# Patient Record
Sex: FEMALE | Race: WHITE | NOT HISPANIC OR LATINO | Employment: FULL TIME | URBAN - METROPOLITAN AREA
[De-identification: names, ages, dates, MRNs, and addresses within clinical notes are randomized per-mention and may not be internally consistent; named-entity substitution may affect disease eponyms.]

---

## 2017-12-20 ENCOUNTER — GENERIC CONVERSION - ENCOUNTER (OUTPATIENT)
Dept: OTHER | Facility: OTHER | Age: 52
End: 2017-12-20

## 2017-12-20 ENCOUNTER — ALLSCRIPTS OFFICE VISIT (OUTPATIENT)
Dept: OTHER | Facility: OTHER | Age: 52
End: 2017-12-20

## 2017-12-20 DIAGNOSIS — Z12.31 ENCOUNTER FOR SCREENING MAMMOGRAM FOR MALIGNANT NEOPLASM OF BREAST: ICD-10-CM

## 2017-12-20 DIAGNOSIS — H91.90 HEARING LOSS: ICD-10-CM

## 2017-12-20 DIAGNOSIS — R92.8 OTHER ABNORMAL AND INCONCLUSIVE FINDINGS ON DIAGNOSTIC IMAGING OF BREAST: ICD-10-CM

## 2017-12-20 DIAGNOSIS — Z23 ENCOUNTER FOR IMMUNIZATION: ICD-10-CM

## 2017-12-29 LAB
ADEQUACY: (HISTORICAL): ABNORMAL
CLINICIAN PROVIDIED ICD 9 OR 10 (HISTORICAL): ABNORMAL
COMMENT (HISTORICAL): ABNORMAL
DIAGNOSIS (HISTORICAL): ABNORMAL
ELECTRONICALLY SIGNED BY (HISTORICAL): ABNORMAL
HPV HIGH RISK (HISTORICAL): NEGATIVE
PATHOLOGIST PROVIDED ICD10 (HISTORICAL): ABNORMAL
PERFORMED BY (HISTORICAL): ABNORMAL
RECOMMENDATION (HISTORICAL): ABNORMAL
REFLEX (HISTORICAL): ABNORMAL

## 2018-01-05 ENCOUNTER — GENERIC CONVERSION - ENCOUNTER (OUTPATIENT)
Dept: OTHER | Facility: OTHER | Age: 53
End: 2018-01-05

## 2018-01-07 ENCOUNTER — GENERIC CONVERSION - ENCOUNTER (OUTPATIENT)
Dept: OTHER | Facility: OTHER | Age: 53
End: 2018-01-07

## 2018-01-12 ENCOUNTER — GENERIC CONVERSION - ENCOUNTER (OUTPATIENT)
Dept: OTHER | Facility: OTHER | Age: 53
End: 2018-01-12

## 2018-01-12 ENCOUNTER — HOSPITAL ENCOUNTER (OUTPATIENT)
Dept: RADIOLOGY | Facility: HOSPITAL | Age: 53
Discharge: HOME | End: 2018-01-12
Attending: INTERNAL MEDICINE
Payer: COMMERCIAL

## 2018-01-12 DIAGNOSIS — Z12.31 ENCOUNTER FOR SCREENING MAMMOGRAM FOR MALIGNANT NEOPLASM OF BREAST: ICD-10-CM

## 2018-01-12 LAB
A/G RATIO (HISTORICAL): 1.6 (ref 1.2–2.2)
ALBUMIN SERPL BCP-MCNC: 4.2 G/DL (ref 3.5–5.5)
ALP SERPL-CCNC: 61 IU/L (ref 39–117)
ALT SERPL W P-5'-P-CCNC: 37 IU/L (ref 0–32)
AST SERPL W P-5'-P-CCNC: 27 IU/L (ref 0–40)
BASOPHILS # BLD AUTO: 0 X10E3/UL (ref 0–0.2)
BASOPHILS # BLD AUTO: 1 %
BILIRUB SERPL-MCNC: 0.3 MG/DL (ref 0–1.2)
BUN SERPL-MCNC: 15 MG/DL (ref 6–24)
BUN/CREA RATIO (HISTORICAL): 21 (ref 9–23)
CALCIUM SERPL-MCNC: 9.7 MG/DL (ref 8.7–10.2)
CHLORIDE SERPL-SCNC: 103 MMOL/L (ref 96–106)
CHOLEST SERPL-MCNC: 254 MG/DL (ref 100–199)
CHOLEST/HDLC SERPL: 5.6 RATIO UNITS (ref 0–4.4)
CO2 SERPL-SCNC: 24 MMOL/L (ref 18–29)
CREAT SERPL-MCNC: 0.73 MG/DL (ref 0.57–1)
DEPRECATED RDW RBC AUTO: 13.9 % (ref 12.3–15.4)
EGFR AFRICAN AMERICAN (HISTORICAL): 111 ML/MIN/1.73
EGFR-AMERICAN CALC (HISTORICAL): 96 ML/MIN/1.73
EOSINOPHIL # BLD AUTO: 0.2 X10E3/UL (ref 0–0.4)
EOSINOPHIL # BLD AUTO: 3 %
GLUCOSE SERPL-MCNC: 93 MG/DL (ref 65–99)
HCT VFR BLD AUTO: 41.2 % (ref 34–46.6)
HDLC SERPL-MCNC: 45 MG/DL
HGB BLD-MCNC: 13.3 G/DL (ref 11.1–15.9)
IMM.GRANULOCYTES (CD4/8) (HISTORICAL): 0 %
IMM.GRANULOCYTES (CD4/8) (HISTORICAL): 0 X10E3/UL (ref 0–0.1)
INTERPRETATION (HISTORICAL): NORMAL
LDLC SERPL CALC-MCNC: 172 MG/DL (ref 0–99)
LYMPHOCYTES # BLD AUTO: 2.6 X10E3/UL (ref 0.7–3.1)
LYMPHOCYTES # BLD AUTO: 38 %
MCH RBC QN AUTO: 29.5 PG (ref 26.6–33)
MCHC RBC AUTO-ENTMCNC: 32.3 G/DL (ref 31.5–35.7)
MCV RBC AUTO: 91 FL (ref 79–97)
MONOCYTES # BLD AUTO: 0.5 X10E3/UL (ref 0.1–0.9)
MONOCYTES (HISTORICAL): 7 %
NEUTROPHILS # BLD AUTO: 3.4 X10E3/UL (ref 1.4–7)
NEUTROPHILS # BLD AUTO: 51 %
PLATELET # BLD AUTO: 453 X10E3/UL (ref 150–379)
POTASSIUM SERPL-SCNC: 4.8 MMOL/L (ref 3.5–5.2)
RBC (HISTORICAL): 4.51 X10E6/UL (ref 3.77–5.28)
SODIUM SERPL-SCNC: 143 MMOL/L (ref 134–144)
TOT. GLOBULIN, SERUM (HISTORICAL): 2.6 G/DL (ref 1.5–4.5)
TOTAL PROTEIN (HISTORICAL): 6.8 G/DL (ref 6–8.5)
TRIGL SERPL-MCNC: 186 MG/DL (ref 0–149)
VLDLC SERPL CALC-MCNC: 37 MG/DL (ref 5–40)
WBC # BLD AUTO: 6.7 X10E3/UL (ref 3.4–10.8)

## 2018-01-12 PROCEDURE — 77067 SCR MAMMO BI INCL CAD: CPT

## 2018-01-16 ENCOUNTER — GENERIC CONVERSION - ENCOUNTER (OUTPATIENT)
Dept: OTHER | Facility: OTHER | Age: 53
End: 2018-01-16

## 2018-01-17 ENCOUNTER — HOSPITAL ENCOUNTER (OUTPATIENT)
Dept: RADIOLOGY | Facility: HOSPITAL | Age: 53
Discharge: HOME | End: 2018-01-17
Attending: INTERNAL MEDICINE
Payer: COMMERCIAL

## 2018-01-17 ENCOUNTER — ALLSCRIPTS OFFICE VISIT (OUTPATIENT)
Dept: OTHER | Facility: OTHER | Age: 53
End: 2018-01-17

## 2018-01-17 DIAGNOSIS — R92.8 OTHER ABNORMAL AND INCONCLUSIVE FINDINGS ON DIAGNOSTIC IMAGING OF BREAST: ICD-10-CM

## 2018-01-17 PROCEDURE — 76642 ULTRASOUND BREAST LIMITED: CPT

## 2018-01-17 PROCEDURE — G0279 TOMOSYNTHESIS, MAMMO: HCPCS

## 2018-01-17 PROCEDURE — 77065 DX MAMMO INCL CAD UNI: CPT

## 2018-01-18 ENCOUNTER — APPOINTMENT (OUTPATIENT)
Dept: AUDIOLOGY | Facility: CLINIC | Age: 53
End: 2018-01-18
Payer: COMMERCIAL

## 2018-01-18 ENCOUNTER — GENERIC CONVERSION - ENCOUNTER (OUTPATIENT)
Dept: OTHER | Facility: OTHER | Age: 53
End: 2018-01-18

## 2018-01-18 PROCEDURE — 92567 TYMPANOMETRY: CPT | Performed by: AUDIOLOGIST

## 2018-01-18 PROCEDURE — 92557 COMPREHENSIVE HEARING TEST: CPT | Performed by: AUDIOLOGIST

## 2018-01-22 ENCOUNTER — ANESTHESIA EVENT (OUTPATIENT)
Dept: GASTROENTEROLOGY | Facility: HOSPITAL | Age: 53
End: 2018-01-22

## 2018-01-23 VITALS
WEIGHT: 239 LBS | HEIGHT: 64 IN | DIASTOLIC BLOOD PRESSURE: 78 MMHG | HEART RATE: 80 BPM | BODY MASS INDEX: 40.8 KG/M2 | RESPIRATION RATE: 16 BRPM | TEMPERATURE: 97.9 F | SYSTOLIC BLOOD PRESSURE: 124 MMHG

## 2018-01-23 NOTE — RESULT NOTES
Verified Results  ([address] [facility]) Pap IG, rfx HPV ASCU [id number] 12:00AM Lidia Lights     Test Name Result Flag Reference   DIAGNOSIS:  A    EPITHELIAL CELL ABNORMALITY  ATYPICAL SQUAMOUS CELLS OF UNDETERMINED SIGNIFICANCE  EPITHELIAL CELL ABNORMALITY  ATYPICAL SQUAMOUS CELLS OF UNDETERMINED SIGNIFICANCE  Recommendation:  A    Suggest follow up as clinically appropriate  Suggest follow up as clinically appropriate  Specimen adequacy:      Satisfactory for evaluation  Endocervical and/or squamous metaplastic  cells (endocervical component) are present  Satisfactory for evaluation  Endocervical and/or squamous metaplastic  cells (endocervical component) are present  Clinician provided ICD10: S92 134     Z01 419   Performed by:      Yasir Felton Cytotechnologist (ASCP)   Yasir Felton Cytotechnologist (ASCP)   Electronically signed by:      Alisha Rushing DO, FCAP, Pathologist   Alisha Rushing DO, FCAP, Pathologist   Michel   Pathologist provided ICD10: R87 610     R87 610   Note: (Report)     The Pap smear is a screening test designed to aid in the detection of  premalignant and malignant conditions of the uterine cervix  It is not a  diagnostic procedure and should not be used as the sole means of detecting  cervical cancer  Both false-positive and false-negative reports do occur  The Pap smear is a screening test designed to aid in the detection of  premalignant and malignant conditions of the uterine cervix  It is not a  diagnostic procedure and should not be used as the sole means of detecting  cervical cancer  Both false-positive and false-negative reports do occur  Reflex      See below for HPV testing results  See below for HPV testing results  HPV, high-risk Negative  Negative   This high-risk HPV test detects thirteen high-risk types  (16/18/31/33/35/39/45/51/52/56/58/59/68) without differentiation

## 2018-01-23 NOTE — PROGRESS NOTES
Assessment    1  Encounter for preventive health examination (V70 0) (Z00 00)   2  Adult BMI 40 0-44 9 kg/sq m (V85 41) (Z68 41)   3  Encounter for gynecological examination with Papanicolaou smear of cervix (V72 31)   (Z01 419)   4  Need for diphtheria-tetanus-pertussis (Tdap) vaccine (V06 1) (Z23)    Plan  Encounter for gynecological examination with Papanicolaou smear of cervix    · (1) THIN PREP PAP WITH IMAGING; Status:Active; Requested for:53Fny0212; Maturation index required? : No  HPV? : if ASCUS  Hard of hearing, Need for diphtheria-tetanus-pertussis (Tdap) vaccine    · *1 -  AUDIOLOGY [facility]  *  Status: Active  Requested for:  80Amk2405  Care Summary provided  : Yes  Hypercholesterolemia    · (1) CBC/PLT/DIFF; Status:Active; Requested for:71Ddg1528;    · (1) COMPREHENSIVE METABOLIC PANEL; Status:Active; Requested for:34Xyh6522;    · (1) LIPID PANEL, FASTING; Status:Active; Requested for:20Hfn1718;   Need for diphtheria-tetanus-pertussis (Tdap) vaccine    · Adacel 5-2-15 5 LF-MCG/0 5 Intramuscular Suspension  Screen for colon cancer    · COLONOSCOPY; Status:Active; Requested for:85Dmn3183;   Screening for malignant neoplasm of breast    · * MAMMO SCREENING BILATERAL W CAD; Status:Active; Requested for:91Jhp9852;     Discussion/Summary  health maintenance visit Currently, she eats a healthy diet and has an adequate exercise regimen  Pap test was done today cervical cancer screening is needed every three years Breast cancer screening: the risks and benefits of breast cancer screening were discussed, monthly self breast exam was advised and mammogram has been ordered  Colorectal cancer screening: colonoscopy has been ordered  Osteoporosis screening: bone mineral density testing is not indicated  Screening lab work includes hemoglobin, glucose and lipid profile  The immunizations will be given as outlined in the orders   Advice and education were given regarding nutrition, aerobic exercise, weight loss, calcium supplements, vitamin D supplements, alcohol use, sunscreen use and seat belt use  Chief Complaint  cpe      History of Present Illness  HM, Adult Female: The patient is being seen for a health maintenance evaluation  The last health maintenance visit was 2 year(s) ago  General Health: The patient's health since the last visit is described as good  She has regular dental visits  She denies vision problems  She denies hearing loss  Immunizations status: not up to date  Lifestyle:  She consumes a diverse and healthy diet  She has weight concerns  She exercises regularly  She exercises 5 times per week  Exercise includes walking  She does not use tobacco  She consumes alcohol  She reports frequent alcohol use and drinking 1 drinks per week  She denies drug use  Reproductive health: the patient is perimenopausal    Screening: cancer screening reviewed and updated  HPI: Here for CPE with pap  Feels well today   thinks she is hard of hearing and she does have some difficulty with background noise  Review of Systems    Constitutional: No fever, no chills, feels well, no tiredness, no recent weight gain or weight loss  Eyes: No complaints of eye pain, no red eyes, no eyesight problems, no discharge, no dry eyes, no itching of eyes  ENT: as noted in HPI  Cardiovascular: No complaints of slow heart rate, no fast heart rate, no chest pain, no palpitations, no leg claudication, no lower extremity edema  Respiratory: No complaints of shortness of breath, no wheezing, no cough, no SOB on exertion, no orthopnea, no PND  Gastrointestinal: No complaints of abdominal pain, no constipation, no nausea or vomiting, no diarrhea, no bloody stools  Genitourinary: No complaints of dysuria, no incontinence, no pelvic pain, no dysmenorrhea, no vaginal discharge or bleeding     Musculoskeletal: No complaints of arthralgias, no myalgias, no joint swelling or stiffness, no limb pain or swelling  Integumentary: No complaints of skin rash or lesions, no itching, no skin wounds, no breast pain or lump  Neurological: No complaints of headache, no confusion, no convulsions, no numbness, no dizziness or fainting, no tingling, no limb weakness, no difficulty walking  Psychiatric: Not suicidal, no sleep disturbance, no anxiety or depression, no change in personality, no emotional problems  Endocrine: No complaints of proptosis, no hot flashes, no muscle weakness, no deepening of the voice, no feelings of weakness  Hematologic/Lymphatic: No complaints of swollen glands, no swollen glands in the neck, does not bleed easily, does not bruise easily  Active Problems    1  Alcohol abuse (305 00) (F10 10)   2  Depression (311) (F32 9)   3  Hypercholesterolemia (272 0) (E78 00)   4  Needs flu shot (V04 81) (Z23)   5  Screening for malignant neoplasm of breast (V76 10) (Z12 31)    Surgical History    · History of Dilation And Curettage    Family History  Father    · Family history of Current Smoker    Social History    · Alcohol Use (History)   · History of Current Every Day Smoker (305 1)   · Daily Coffee Consumption (___ Cups/Day)   · Daily Cola Consumption (___ Cans/Day)   · Former smoker (F55 27) (Q73 531)    Current Meds   1  No Reported Medications Recorded    Allergies    1  No Known Drug Allergies    Vitals   Recorded: 17Vhe1009 11:34AM   Temperature 97 9 F   Heart Rate 80   Respiration 16   Systolic 945   Diastolic 78   Height 5 ft 4 in   Weight 239 lb    BMI Calculated 41 02   BSA Calculated 2 11     Physical Exam    Constitutional   General appearance: Abnormal   obese  Eyes   Conjunctiva and lids: No swelling, erythema or discharge  Pupils and irises: Equal, round, reactive to light  Ears, Nose, Mouth, and Throat   External inspection of ears and nose: Normal     Otoscopic examination: Tympanic membranes translucent with normal light reflex   Canals patent without erythema  Hearing: Normal     Nasal mucosa, septum, and turbinates: Normal without edema or erythema  Lips, teeth, and gums: Normal, good dentition  Oropharynx: Normal with no erythema, edema, exudate or lesions  Neck   Neck: Supple, symmetric, trachea midline, no masses  Thyroid: Normal, no thyromegaly  Pulmonary   Respiratory effort: No increased work of breathing or signs of respiratory distress  Percussion of chest: Normal     Palpation of chest: Normal     Auscultation of lungs: Clear to auscultation  Cardiovascular   Palpation of heart: Normal PMI, no thrills  Auscultation of heart: Normal rate and rhythm, normal S1 and S2, no murmurs  Carotid pulses: 2+ bilaterally  Abdominal aorta: Normal     Femoral pulses: 2+ bilaterally  Pedal pulses: 2+ bilaterally  Examination of extremities for edema and/or varicosities: Normal     Chest   Breasts: Normal, no dimpling or skin changes appreciated  Palpation of breasts and axillae: Normal, no masses palpated  Chest: Normal     Abdomen   Abdomen: Non-tender, no masses  Liver and spleen: No hepatomegaly or splenomegaly  Examination for hernias: No hernia appreciated  Genitourinary   External genitalia and vagina: Normal, no lesions appreciated  Urethra: Normal, no discharge  Bladder: Not distended, no tenderness  Cervix: Normal, no lesions  Uterus: Normal size, no tenderness, no masses  Adnexa/Parametria: Normal, no masses or tenderness  Lymphatic   Palpation of lymph nodes in neck: No lymphadenopathy  Palpation of lymph nodes in axillae: No lymphadenopathy  Palpation of lymph nodes in groin: No lymphadenopathy  Palpation of lymph nodes in other areas: No lymphadenopathy  Musculoskeletal   Gait and station: Normal     Digits and nails: Normal without clubbing or cyanosis      Joints, bones, and muscles: Normal     Range of motion: Normal     Stability: Normal     Muscle strength/tone: Normal  Skin   Skin and subcutaneous tissue: Normal without rashes or lesions  Palpation of skin and subcutaneous tissue: Normal turgor  Neurologic   Cranial nerves: Cranial nerves II-XII intact  Reflexes: 2+ and symmetric  Sensation: No sensory loss  Psychiatric   Judgment and insight: Normal     Orientation to person, place, and time: Normal     Recent and remote memory: Intact  Mood and affect: Normal        Procedure    Procedure: Indication: routine screening     Results: 20/20 in both eyes with corrective device, 20/20 in the right eye with corrective device, 20/20 in the left eye with corrective device   Color vision was and the results were normal       Signatures   Electronically signed by : EUGENE Alaniz ; Dec 20 2017 12:16PM EST                       (Author)

## 2018-01-23 NOTE — RESULT NOTES
Discussion/Summary   see task, patient aware and is getting additional studies  Christina Perrin MD     Verified Results  *US BREAST RIGHT LIMITED (DIAGNOSTIC) [id number] 01:59PM Sherry Paz Order Number: [id number]    - Patient Instructions: To schedule this appointment, please contact Central Scheduling at [phone]  Test Name Result Flag Reference   US BREAST RIGHT LIMITED (Report)     Patient History:   Patient's BMI is 41 2  Reason for exam: addl evaluation requested from abnormal    screening  Mammo Diagnostic Right W DBT and CAD: January 17, 2018 - Check In   #: [de-identified]   2D/3D Procedure   3D CC and MLO view(s) were taken of the right breast    2D CC and MLO view(s) were taken of the right breast        Technologist: BETHANY Garcias   Prior study comparison: January 12, 2018, mammo screening    bilateral W CAD performed at [place] [address]  August 31, 2015, bilateral screening mammogram performed at [place] [address]  US Breast Right Limited: January 17, 2018 - Check In #: [de-identified]   Standard views  Technologist: Romayne Stade, RDMS   Diagnostic images of RIGHT breast were obtained with 3-D    tomographic imaging in the CC and MLO plane  The questionable    area of distortion and asymmetry along the RIGHT cc 12:00 plain    appears to likely represent summation artifact  There is however   a small focal area of nodular asymmetry in the upper outer    breast posterior 3rd also in question on these tomographic    images  Targeted ultrasound of RIGHT upper outer quadrant was performed    with a high frequency linear transducer  There is no sonographic    evidence for cystic or solid mass lesion or suspicious foci of    acoustic attenuation  Single small intramammary lymph node    incidentally noted in the 10 o'clock position 12 cm from the    nipple  Possibly partially accounting for the above finding   No   other sonographic abnormality identified  CONCLUSION: No evidence of malignancy demonstrated  ACR BI-RADSï¾® Assessments: BiRad:2 - Benign (Overall)   Right breast Right Diag Mamm: BiRad:2 - benign finding in the    right breast    Right breast Right Brst US: BiRad:2 - benign finding in the right   breast      Recommendation:   Routine screening mammogram of both breasts  The patient is scheduled in a reminder system for screening    mammography  Transcription Location: [facility] 98: [id number]     Risk Value(s):   Tyrer-Cuzick 10 Year: 2 300%, Tyrer-Cuzick Lifetime: 10 100%,    Myriad Table: 1 5%, EDGAR 5 Year: 0 6%, NCI Lifetime: 6 0%     [facility] DIAGNOSTIC RIGHT W 3D & CAD 17Jan2018 01:58PM Sherryll Favre Order Number: [id number]    - Patient Instructions: To schedule this appointment, please contact Central Scheduling at [phone]  Do not wear any perfume, powder, lotion or deodorant on breast or underarm area  Please bring your doctors order, referral (if needed) and insurance information with you on the day of the test  Failure to bring this information may result in this test being rescheduled  Arrive 15 minutes prior to your appointment time to register  On the day of your test, please bring any prior mammogram or breast studies with you that were not performed at a [facility]  Failure to bring prior exams may result in your test needing to be rescheduled  Test Name Result Flag Reference   [facility] DIAGNOSTIC RIGHT W 3D & CAD (Report)     Patient History:   Patient's BMI is 41 2  Reason for exam: addl evaluation requested from abnormal    screening       Mammo Diagnostic Right W DBT and CAD: January 17, 2018 - Check In   #: [de-identified]   2D/3D Procedure   3D CC and MLO view(s) were taken of the right breast    2D CC and MLO view(s) were taken of the right breast        Technologist: BETHANY Snyder   Prior study comparison: January 12, 2018, mammo screening    bilateral W CAD performed at [place] [address]  August 31, 2015, bilateral screening mammogram performed at [facility]  US Breast Right Limited: January 17, 2018 - Check In #: [de-identified]   Standard views  Technologist: Josh Barrett RDMS   Diagnostic images of RIGHT breast were obtained with 3-D    tomographic imaging in the CC and MLO plane  The questionable    area of distortion and asymmetry along the RIGHT cc 12:00 plain    appears to likely represent summation artifact  There is however   a small focal area of nodular asymmetry in the upper outer    breast posterior 3rd also in question on these tomographic    images  Targeted ultrasound of RIGHT upper outer quadrant was performed    with a high frequency linear transducer  There is no sonographic    evidence for cystic or solid mass lesion or suspicious foci of    acoustic attenuation  Single small intramammary lymph node    incidentally noted in the 10 o'clock position 12 cm from the    nipple  Possibly partially accounting for the above finding  No   other sonographic abnormality identified  CONCLUSION: No evidence of malignancy demonstrated  ACR BI-RADSï¾® Assessments: BiRad:2 - Benign (Overall)   Right breast Right Diag Mamm: BiRad:2 - benign finding in the    right breast    Right breast Right Brst US: BiRad:2 - benign finding in the right   breast      Recommendation:   Routine screening mammogram of both breasts  The patient is scheduled in a reminder system for screening    mammography  Transcription Location: [facility] 98: [id number]     Risk Value(s):   Tyrer-Cuzick 10 Year: 2 300%, Tyrer-Cuzick Lifetime: 10 100%,    Myriad Table: 1 5%, EDGAR 5 Year: 0 6%, NCI Lifetime: 6 0%     * MAMMO SCREENING BILATERAL W CAD 12Jan2018 09:43AM Deneen Milian Order Number: [id number]    - Patient Instructions: To schedule this appointment, please contact Central Scheduling at [phone]    Do not wear any perfume, powder, lotion or deodorant on breast or underarm area  Please bring your doctors order, referral (if needed) and insurance information with you on the day of the test  Failure to bring this information may result in this test being rescheduled  Arrive 15 minutes prior to your appointment time to register  On the day of your test, please bring any prior mammogram or breast studies with you that were not performed at a [facility]  Failure to bring prior exams may result in your test needing to be rescheduled  Test Name Result Flag Reference   MAMMO SCREENING BILATERAL W CAD (Report)     Patient History:   Patient's BMI is 41 2  Reason for exam: screening, asymptomatic  Screening     Mammo Screening Bilateral W CAD: January 12, 2018 - Check In #:    [de-identified]   Bilateral CC and MLO view(s) were taken  Technologist: KAMINI Ennis   Prior study comparison: August 31, 2015, bilateral screening    mammogram performed at [place] [address]  December 29, 2011, bilateral screening mammogram performed at [place] [address]  There are scattered fibroglandular densities  No dominant soft    tissue mass, architectural distortion or suspicious    calcifications are noted in either breast  The skin and nipple    contours are within normal limits  In the right retroareolar upper breast, 5 cm posterior to the    nipple, an asymmetric density is identified for which compression   and lateral and/or 3-D mammography recommended followed by    ultrasound if this persists  Needs additional imaging  ACR BI-RADSï¾® Assessments: BiRad:0 - Incomplete: needs additional    imaging evaluation - Right     Recommendation:   Further imaging of the right breast    Routine screening mammogram of the left breast in 1 year     A breast health care nurse from our facility will be contacting    the patient regarding the need for additional imaging  Analyzed by CAD     The patient is scheduled in a reminder system for screening    mammography  8-10% of cancers will be missed on mammography  Management of a    palpable abnormality must be based on clinical grounds  Patients   will be notified of their results via letter from our facility  Accredited by [employer] of Radiology and FDA  Transcription Location: [address] [place] [address]: [id number]     Risk Value(s):   Tyrer-Cuzick 10 Year: 2 300%, Tyrer-Cuzick Lifetime: 10 100%,    Myriad Table: 1 5%, EDGAR 5 Year: 0 6%, NCI Lifetime: 6 0%   Signed by:   Beata Weston MD   1/12/18     (1) CBC/PLT/DIFF [id number] 07:11AM Haven Ford     Test Name Result Flag Reference   WBC 6 7 x10E3/uL  3 4-10 8   RBC 4 51 x10E6/uL  3 77-5 28   Hemoglobin 13 3 g/dL  11 1-15 9   Hematocrit 41 2 %  34 0-46  6   MCV 91 fL  79-97   MCH 29 5 pg  26 6-33 0   MCHC 32 3 g/dL  31 5-35 7   RDW 13 9 %  12 3-15 4   Platelets 074 T35N1/IY H 150-379   Neutrophils 51 %  Not Estab  Lymphs 38 %  Not Estab  Monocytes 7 %  Not Estab  Eos 3 %  Not Estab  Basos 1 %  Not Estab  Neutrophils (Absolute) 3 4 x10E3/uL  1 4-7 0   Lymphs (Absolute) 2 6 x10E3/uL  0 7-3 1   Monocytes(Absolute) 0 5 x10E3/uL  0 1-0 9   Eos (Absolute) 0 2 x10E3/uL  0 0-0 4   Baso (Absolute) 0 0 x10E3/uL  0 0-0 2   Immature Granulocytes 0 %  Not Estab     Immature Grans (Abs) 0 0 x10E3/uL  0 0-0 1     (1) COMPREHENSIVE METABOLIC PANEL [id number] 83:01BF Haven Ford     Test Name Result Flag Reference   Glucose, Serum 93 mg/dL  65-99   BUN 15 mg/dL  6-24   Creatinine, Serum 0 73 mg/dL  0 57-1 00   BUN/Creatinine Ratio 21  9-23   Sodium, Serum 143 mmol/L  134-144   Potassium, Serum 4 8 mmol/L  3 5-5 2   Chloride, Serum 103 mmol/L     Carbon Dioxide, Total 24 mmol/L  18-29   Calcium, Serum 9 7 mg/dL  8 7-10 2   Protein, Total, Serum 6 8 g/dL  6 0-8 5   Albumin, Serum 4 2 g/dL  3 5-5 5   Globulin, Total 2 6 g/dL  1 5-4 5   A/G Ratio 1 6 1 2-2 2   Bilirubin, Total 0 3 mg/dL  0 0-1 2   Alkaline Phosphatase, S 61 IU/L     AST (SGOT) 27 IU/L  0-40   ALT (SGPT) 37 IU/L H 0-32   eGFR If NonAfricn Am 96 mL/min/1 73  >59   eGFR If Africn Am 111 mL/min/1 73  >59     (1) LIPID PANEL, FASTING [id number] 07:11AM Rishabh Ford     Test Name Result Flag Reference   Cholesterol, Total 254 mg/dL H 100-199   Triglycerides 186 mg/dL H 0-149   HDL Cholesterol 45 mg/dL  >39   VLDL Cholesterol José Miguel 37 mg/dL  5-40   LDL Cholesterol Calc 172 mg/dL H 0-99   T  Chol/HDL Ratio 5 6 ratio units H 0 0-4 4   T  Chol/HDL Ratio                                                             Men  Women                                               1/2 Avg  Risk  3 4    3 3                                                   Avg Risk  5 0    4 4                                                2X Avg  Risk  9 6    7 1                                                3X Avg  Risk 23 4   11 0     [facility]) Cardiovascular Risk Assessment 12Jan2018 07:11AM Catarino Boxer     Test Name Result Flag Reference   Interpretation Note     Supplemental report is available  PDF Image

## 2018-01-23 NOTE — MISCELLANEOUS
Message     Recorded as Task   Date: 01/05/2018 11:01 AM, Created By: Jonah Merchant   Task Name: Intake   Assigned To: GI Procedure,TEAM   Regarding Patient: Carrington Tadeo, Status: Complete   Comment:    Jonah Mayur - 05 Jan 2018 11:01 AM     TASK CREATED  Date: 1-5-18  Screened byYaw Gamino    Referring Provider: [  ]    Pre- Screening:   BMI: [  ]  Has patient been referred for a routine screening Colonoscopy? Y   Is the patient over 48years of age? Y     If the answer is YES to both questions, proceed to the medical questions  Do you have any of the following symptoms? NO    Have you had a coronary or vascular stent within the last year? N    Have you had a heart attack or stroke in the last 6 months? N    Have you had intestinal surgery in the last 3 months? N    Do you have problems with:   NO    Do you use:  NO    Have you been hospitalized in the last Month? N    Have you been diagnosed with a bleeding disorder or anemia? N    Have you had chest pain (angina) or breathing problems  (COPD) in the last 3 months? N    Do you have any difficulty walking up a flight of stairs? N    Have you had Kidney failure or insufficiency? N    Have you had heart valve surgery? N    Are you confined to a wheelchair? N     Do you take,,,,, or other blood thinning medication? Yes N    Do you take insulin for diabetes? N    : If patient answers NO to medical questions, then schedule procedure  If patient answers YES to medical questions, then schedule office appointment  Previous Colonoscopy ( if yes)  N  Date and Facility of last colonoscopy? [   ]    Patient scheduled for procedure: Yony Jarquin  ]  Scheduled by: Ben Jacobsen  ]  Date: [2/5/18  ]  Time: Juan Ramon Palmer  ]  Provider: Kay Hairston  ]  Location: [ [facility] ]    Referral Obtained for procedure:  NO  Were instructions Mailed? YES  Was the prep sent to Pharmacy?   YES    Comments: PASSED OA----CALLBACK # [phone]---AVILABLE DATES 1-18TH AND 1-19TH IF POSSIBLE [facility] - 05 Jan 2018 11:03 AM     TASK IN PROGRESS   Sofiya Watt - 05 Jan 2018 2:04 PM     TASK EDITED  COLON scheduled with Dr Jackson at [facility] on 2/5/18  DenisseadrianaSofiya - 05 Jan 2018 2:04 PM     TASK COMPLETED        Active Problems    1  Adult BMI 40 0-44 9 kg/sq m (V85 41) (Z68 41)   2  Alcohol abuse (305 00) (F10 10)   3  Depression (311) (F32 9)   4  Encounter for gynecological examination with Papanicolaou smear of cervix (V72 31)   (Z01 419)   5  Hard of hearing (389 9) (H91 90)   6  Hypercholesterolemia (272 0) (E78 00)   7  Need for diphtheria-tetanus-pertussis (Tdap) vaccine (V06 1) (Z23)   8  Needs flu shot (V04 81) (Z23)   9  Screen for colon cancer (V76 51) (Z12 11)   10  Screening for malignant neoplasm of breast (V76 10) (Z12 31)    Current Meds   1  No Reported Medications Recorded    Allergies    1   No Known Drug Allergies    Signatures   Electronically signed by : Denisa Nichols, ; Jan 5 2018  2:05PM EST                       (Author)

## 2018-01-24 VITALS
HEIGHT: 64 IN | WEIGHT: 238 LBS | HEART RATE: 72 BPM | RESPIRATION RATE: 20 BRPM | SYSTOLIC BLOOD PRESSURE: 140 MMHG | BODY MASS INDEX: 40.63 KG/M2 | TEMPERATURE: 99.3 F | DIASTOLIC BLOOD PRESSURE: 92 MMHG

## 2018-01-24 VITALS
BODY MASS INDEX: 41.32 KG/M2 | HEIGHT: 64 IN | DIASTOLIC BLOOD PRESSURE: 88 MMHG | SYSTOLIC BLOOD PRESSURE: 140 MMHG | WEIGHT: 242 LBS

## 2018-01-31 ENCOUNTER — TELEPHONE (OUTPATIENT)
Dept: GASTROENTEROLOGY | Facility: AMBULARY SURGERY CENTER | Age: 53
End: 2018-01-31

## 2018-02-02 PROBLEM — Z12.11 SCREENING FOR COLON CANCER: Status: ACTIVE | Noted: 2018-02-02

## 2018-02-05 ENCOUNTER — ANESTHESIA (OUTPATIENT)
Dept: GASTROENTEROLOGY | Facility: HOSPITAL | Age: 53
End: 2018-02-05

## 2018-02-28 ENCOUNTER — ANESTHESIA EVENT (OUTPATIENT)
Dept: PERIOP | Facility: AMBULARY SURGERY CENTER | Age: 53
End: 2018-02-28
Payer: COMMERCIAL

## 2018-03-09 RX ORDER — PROMETHAZINE HYDROCHLORIDE AND CODEINE PHOSPHATE 6.25; 1 MG/5ML; MG/5ML
5 SYRUP ORAL EVERY 6 HOURS PRN
Status: ON HOLD | COMMUNITY
End: 2018-03-12 | Stop reason: ALTCHOICE

## 2018-03-09 RX ORDER — ALBUTEROL SULFATE 90 UG/1
2 AEROSOL, METERED RESPIRATORY (INHALATION) EVERY 4 HOURS PRN
Status: ON HOLD | COMMUNITY
End: 2018-03-12 | Stop reason: ALTCHOICE

## 2018-03-12 ENCOUNTER — ANESTHESIA (OUTPATIENT)
Dept: PERIOP | Facility: AMBULARY SURGERY CENTER | Age: 53
End: 2018-03-12
Payer: COMMERCIAL

## 2018-03-12 ENCOUNTER — HOSPITAL ENCOUNTER (OUTPATIENT)
Facility: AMBULARY SURGERY CENTER | Age: 53
Setting detail: OUTPATIENT SURGERY
Discharge: HOME | End: 2018-03-12
Attending: INTERNAL MEDICINE | Admitting: INTERNAL MEDICINE
Payer: COMMERCIAL

## 2018-03-12 VITALS
TEMPERATURE: 97.6 F | WEIGHT: 241 LBS | HEART RATE: 59 BPM | BODY MASS INDEX: 41.15 KG/M2 | RESPIRATION RATE: 16 BRPM | OXYGEN SATURATION: 99 % | DIASTOLIC BLOOD PRESSURE: 70 MMHG | HEIGHT: 64 IN | SYSTOLIC BLOOD PRESSURE: 115 MMHG

## 2018-03-12 DIAGNOSIS — Z12.11 SCREENING FOR COLON CANCER: ICD-10-CM

## 2018-03-12 PROCEDURE — 45380 COLONOSCOPY AND BIOPSY: CPT | Performed by: INTERNAL MEDICINE

## 2018-03-12 PROCEDURE — 45385 COLONOSCOPY W/LESION REMOVAL: CPT | Performed by: INTERNAL MEDICINE

## 2018-03-12 PROCEDURE — 88305 TISSUE EXAM BY PATHOLOGIST: CPT | Performed by: PATHOLOGY

## 2018-03-12 RX ORDER — SODIUM CHLORIDE 9 MG/ML
50 INJECTION, SOLUTION INTRAVENOUS CONTINUOUS
Status: CANCELLED | OUTPATIENT
Start: 2018-03-12

## 2018-03-12 RX ORDER — LIDOCAINE HYDROCHLORIDE 10 MG/ML
INJECTION, SOLUTION INFILTRATION; PERINEURAL AS NEEDED
Status: DISCONTINUED | OUTPATIENT
Start: 2018-03-12 | End: 2018-03-12 | Stop reason: SURG

## 2018-03-12 RX ORDER — PROPOFOL 10 MG/ML
INJECTION, EMULSION INTRAVENOUS AS NEEDED
Status: DISCONTINUED | OUTPATIENT
Start: 2018-03-12 | End: 2018-03-12 | Stop reason: SURG

## 2018-03-12 RX ORDER — SODIUM CHLORIDE, SODIUM LACTATE, POTASSIUM CHLORIDE, CALCIUM CHLORIDE 600; 310; 30; 20 MG/100ML; MG/100ML; MG/100ML; MG/100ML
125 INJECTION, SOLUTION INTRAVENOUS CONTINUOUS
Status: CANCELLED | OUTPATIENT
Start: 2018-03-12

## 2018-03-12 RX ORDER — SODIUM CHLORIDE 9 MG/ML
INJECTION, SOLUTION INTRAVENOUS CONTINUOUS PRN
Status: DISCONTINUED | OUTPATIENT
Start: 2018-03-12 | End: 2018-03-12 | Stop reason: SURG

## 2018-03-12 RX ADMIN — PROPOFOL 100 MG: 10 INJECTION, EMULSION INTRAVENOUS at 11:56

## 2018-03-12 RX ADMIN — SODIUM CHLORIDE: 0.9 INJECTION, SOLUTION INTRAVENOUS at 11:10

## 2018-03-12 RX ADMIN — PROPOFOL 100 MG: 10 INJECTION, EMULSION INTRAVENOUS at 12:15

## 2018-03-12 RX ADMIN — PROPOFOL 100 MG: 10 INJECTION, EMULSION INTRAVENOUS at 12:00

## 2018-03-12 RX ADMIN — PROPOFOL 100 MG: 10 INJECTION, EMULSION INTRAVENOUS at 12:05

## 2018-03-12 RX ADMIN — LIDOCAINE HYDROCHLORIDE 50 MG: 10 INJECTION, SOLUTION INFILTRATION; PERINEURAL at 11:56

## 2018-03-12 RX ADMIN — PROPOFOL 100 MG: 10 INJECTION, EMULSION INTRAVENOUS at 12:10

## 2018-03-12 RX ADMIN — PROPOFOL 50 MG: 10 INJECTION, EMULSION INTRAVENOUS at 12:20

## 2018-03-12 NOTE — ANESTHESIA PREPROCEDURE EVALUATION
Review of Systems/Medical History          Cardiovascular  Hyperlipidemia,    Pulmonary       GI/Hepatic            Endo/Other     GYN       Hematology   Musculoskeletal       Neurology   Psychology           Physical Exam    Airway    Mallampati score: II         Dental   No notable dental hx     Cardiovascular      Pulmonary      Other Findings        Anesthesia Plan  ASA Score- 2     Anesthesia Type- IV sedation with anesthesia with ASA Monitors  Additional Monitors:   Airway Plan:     Comment: I, Dr Nohemi Diaz, the attending physician, have personally seen and evaluated the patient prior to anesthetic care  I have reviewed the pre-anesthetic record, and other medical records if appropriate to the anesthetic care  If a CRNA is involved in the case, I have reviewed the CRNA assessment, if present, and agree  The patient is in a suitable condition to proceed with my formulated anesthetic plan        Plan Factors-    Induction- intravenous  Postoperative Plan-     Informed Consent- Anesthetic plan and risks discussed with patient  I personally reviewed this patient with the CRNA  Discussed and agreed on the Anesthesia Plan with the CRNA  Mya Berman

## 2018-03-12 NOTE — H&P
History and Physical -  [facility]  Winifred Servin 50 y o  female MRN: [id number]    HPI: Ada Vazquez is a 50y o  year old female who presents for colon cancer screening  Review of Systems    Historical Information   Past Medical History:   Diagnosis Date    Abnormal Pap smear of cervix     Depression     Hyperlipidemia      Past Surgical History:   Procedure Laterality Date    DILATION AND CURETTAGE OF UTERUS       Social History   History   Alcohol Use    Yes     History   Drug Use No     History   Smoking Status    Former Smoker   Smokeless Tobacco    Former User     Comment: 2 years     History reviewed  No pertinent family history  Meds/Allergies     No prescriptions prior to admission  No Known Allergies    Objective     Blood pressure 135/86, pulse 78, temperature (!) 97 2 °F (36 2 °C), resp  rate 16, height 5' 4" (1 626 m), weight 109 kg (241 lb), SpO2 94 %  PHYSICAL EXAM    Gen: NAD  CV: RRR  CHEST: Clear  ABD: soft, NT/ND  EXT: no edema  Neuro: AAO      ASSESSMENT/PLAN:  This is a 50y o  year old female here for colon cancer screening  PLAN:   Procedure:  Colonoscopy

## 2018-03-12 NOTE — OP NOTE
Colonoscopy Procedure Note    Procedure: Colonoscopy    Sedation: Monitored anesthesia care, check anesthesia records      ASA Class: 2    INDICATIONS: Screening for Colon Cancer    POST-OP DIAGNOSIS: See the impression below    Procedure Details     Prior colonoscopy: No prior colonoscopy  Informed consent was obtained for the procedure, including sedation  Risks of perforation, hemorrhage, adverse drug reaction and aspiration were discussed  The patient was placed in the left lateral decubitus position  Based on the pre-procedure assessment, including review of the patient's medical history, medications, allergies, and review of systems, she had been deemed to be an appropriate candidate for conscious sedation; she was therefore sedated with the medications listed below  The patient was monitored continuously with telemetry, pulse oximetry, blood pressure monitoring, and direct observations  A rectal examination was performed  The variable-stiffness pediatric colonoscope was inserted into the rectum and advanced under direct vision to the cecum, which was identified by the ileocecal valve and appendiceal orifice  The quality of the colonic preparation was good  A careful inspection was made as the colonoscope was withdrawn, including a retroflexed view of the rectum; findings and interventions are described below  Findings:  1   1 2 cm sessile polyp noted in the cecum, removed using Exacto snare in its entirety  2   Four polyps noted in the ascending colon measuring from 3-7 mm, flat polyps removed using biopsy forceps  3   Two diminutive polyps noted in the sigmoid colon  4   Three diminutive polyps noted in the proximal rectum  5   One diminutive polyp noted in the distal rectum  6   Retroflexion was performed and revealed small internal hemorrhoids  Complications:  None; patient tolerated the procedure well  Impression:    1  Eleven colon polyps    2   Small internal hemorrhoids  Recommendations:  1  Follow-up biopsy results in 2-3 weeks  2   High-fiber diet

## 2018-03-12 NOTE — ANESTHESIA POSTPROCEDURE EVALUATION
Post-Op Assessment Note      CV Status:  Stable    Mental Status:  Alert and awake    Hydration Status:  Euvolemic    PONV Controlled:  Controlled    Airway Patency:  Patent    Post Op Vitals Reviewed: Yes          Staff: Anesthesiologist, CRNA           BP   119/58   Temp     Pulse  78   Resp   16   SpO2   96%

## 2019-10-23 ENCOUNTER — OFFICE VISIT (OUTPATIENT)
Dept: OBGYN CLINIC | Facility: CLINIC | Age: 54
End: 2019-10-23
Payer: COMMERCIAL

## 2019-10-23 ENCOUNTER — APPOINTMENT (OUTPATIENT)
Dept: RADIOLOGY | Facility: AMBULARY SURGERY CENTER | Age: 54
End: 2019-10-23
Payer: COMMERCIAL

## 2019-10-23 VITALS
DIASTOLIC BLOOD PRESSURE: 85 MMHG | SYSTOLIC BLOOD PRESSURE: 142 MMHG | HEART RATE: 76 BPM | WEIGHT: 235 LBS | BODY MASS INDEX: 40.12 KG/M2 | HEIGHT: 64 IN

## 2019-10-23 DIAGNOSIS — M79.18 MYOFASCIAL PAIN WITH REFERRED PAIN: Primary | ICD-10-CM

## 2019-10-23 DIAGNOSIS — M75.41 ROTATOR CUFF IMPINGEMENT SYNDROME OF RIGHT SHOULDER: ICD-10-CM

## 2019-10-23 DIAGNOSIS — M25.511 ACUTE PAIN OF RIGHT SHOULDER: ICD-10-CM

## 2019-10-23 PROCEDURE — 73030 X-RAY EXAM OF SHOULDER: CPT

## 2019-10-23 PROCEDURE — 99204 OFFICE O/P NEW MOD 45 MIN: CPT | Performed by: PHYSICAL MEDICINE & REHABILITATION

## 2019-10-23 RX ORDER — METHOCARBAMOL 500 MG/1
500 TABLET, FILM COATED ORAL 2 TIMES DAILY PRN
Qty: 30 TABLET | Refills: 0 | Status: SHIPPED | OUTPATIENT
Start: 2019-10-23 | End: 2021-05-19

## 2019-10-23 NOTE — PROGRESS NOTES
1  Myofascial pain with referred pain  methocarbamol (ROBAXIN) 500 mg tablet    Ambulatory referral to Physical Therapy   2  Acute pain of right shoulder  XR shoulder 2+ vw right    Ambulatory referral to Physical Therapy   3  Rotator cuff impingement syndrome of right shoulder       Orders Placed This Encounter   Procedures    Nerve block    XR shoulder 2+ vw right    Ambulatory referral to Physical Therapy        Imaging Studies (I personally reviewed images in PACS and report):  Right shoulder x-rays dated 10/23/2019  These images show a circular density in the humeral head that likely represents calcification  Minimal degenerative changes along the acromioclavicular joint  No other abnormalities noted  Impression:  Right shoulder and arm pain that is multifactorial likely secondary to myofascial pain syndrome/trigger points and rotator cuff impingement  We discussed different treatment options and decided to proceed with trigger point injections, prescription of methocarbamol, continue naproxen and start physical therapy  I will see her back in about 4-5 weeks to reassess or earlier if needed  Return in about 4 weeks (around 11/20/2019)  HPI:  Marly León is a 48 y o  female  who presents for evaluation of No chief complaint on file  Onset/Mechanism: In late September 2019- she was throwing garbage and her arm twisted  She continues to have pain in the arm and right shoulder  It also goes into the neck as well  Location: As above  Radiation: Right shoulder with radiation down the arm and into the neck  Quality: Aching  Provocative: Improves with moving but being stationary/sitting makes things worse  Severity: Depends on the above  Associated Symptoms: Denies numbness/tingling/weakness in the hand or fingers  Treatment so far:  She has had ibuprofen, is staying hydrated and doing exercises that she learned from the last time she had physical therapy for a rotator cuff tear she had many years ago  Review of Systems   Constitutional: Positive for activity change  Negative for fever  HENT: Negative for sore throat  Eyes: Negative for visual disturbance  Respiratory: Negative for shortness of breath  Cardiovascular: Negative for chest pain  Gastrointestinal: Negative for abdominal pain  Endocrine: Negative for polydipsia  Genitourinary: Negative for difficulty urinating  Musculoskeletal: Positive for arthralgias  Skin: Negative for rash  Allergic/Immunologic: Negative for immunocompromised state  Neurological: Negative for weakness and numbness  Hematological: Does not bruise/bleed easily  Psychiatric/Behavioral: Negative for confusion  Following history reviewed and updated:  Past Medical History:   Diagnosis Date    Abnormal Pap smear of cervix     Depression     Hyperlipidemia      Past Surgical History:   Procedure Laterality Date    DILATION AND CURETTAGE OF UTERUS      WA COLONOSCOPY FLX DX W/COLLJ SPEC WHEN PFRMD N/A 3/12/2018    Procedure: COLONOSCOPY;  Surgeon: Eda Regalado MD;  [facility]; Service: Gastroenterology     Social History   Social History     Substance and Sexual Activity   Alcohol Use Yes     Social History     Substance and Sexual Activity   Drug Use No     Social History     Tobacco Use   Smoking Status Former Smoker   Smokeless Tobacco Former User   Tobacco Comment    2 years     History reviewed  No pertinent family history  No Known Allergies     Constitutional:  /85 (BP Location: Right arm, Patient Position: Sitting, Cuff Size: Standard)   Pulse 76   Ht 5' 4" (1 626 m)   Wt 107 kg (235 lb)   BMI 40 34 kg/m²    General: NAD  Eyes: Anicteric sclerae  Neck: Supple  Lungs: Unlabored breathing  Cardiovascular: No lower extremity edema  Skin: Intact without erythema  Neurologic: Sensation intact to light touch  Psychiatric: Mood and affect are appropriate      Right Shoulder Exam     Tenderness The patient is experiencing tenderness in the acromion (There is diffuse tenderness to palpation across the bilateral upper trapezius musculature)  Range of Motion   The patient has normal right shoulder ROM  Muscle Strength   Abduction: 3/5   Internal rotation: 3/5   External rotation: 3/5     Tests   Ewn test: positive  Impingement: positive    Other   Erythema: absent  Scars: absent  Sensation: normal  Pulse: present             Nerve block  Date/Time: 10/23/2019 12:14 PM  Performed by: Rohit Brambila DO  Authorized by: Rohit Brambila DO     Patient location:  Clinic  Other Assisting Provider: Yes (comment)    Consent:     Consent obtained:  Verbal    Consent given by:  Patient    Risks discussed: Allergic reaction, bleeding, infection, intravenous injection and pain    Alternatives discussed:  No treatment  Universal protocol:     Procedure explained and questions answered to patient or proxy's satisfaction: yes      Patient identity confirmed:  Verbally with patient  Indications:     Indications:  Pain relief  Location:     Body area:  Trunk  Pre-procedure details:     Procedure prep: Povidon-iodine followed by two swabs of alcohol  Skin anesthesia (see MAR for exact dosages):     Skin anesthesia method:  Local infiltration    Local anesthetic:  Lidocaine 1% w/o epi  Procedure details (see MAR for exact dosages): Block needle gauge:  22 G (1 5 inch)    Anesthetic injected:  Lidocaine 1% w/o epi    Steroid injected:  None    Additive injected:  None    Injection procedure:  Anatomic landmarks palpated, negative aspiration for blood, anatomic landmarks identified, incremental injection and introduced needle  Post-procedure details:     Dressing: Sterile band-aid  Outcome:  Pain relieved    Patient tolerance of procedure: Tolerated well, no immediate complications  Comments:      Trigger point injections with lidocaine 1% to four spots across the bilateral trapezius musculature    Patient tolerated the procedure well and reported symptom relief right afterwards  This document was recorded using voice recognition software and errors may be noted

## 2019-10-23 NOTE — PATIENT INSTRUCTIONS
You had a trigger point injection of the upper back today  Some bruising is common  You may take an over-the-counter medicine such as Tylenol or ibuprofen if there is any discomfort in the next few days  Although the procedure is quite safe, you should call our office if you experience any severe pain, signs of infection such as an expanding region of redness around or drainage from the procedure site, or shortness of breath in the next few days

## 2019-10-31 ENCOUNTER — EVALUATION (OUTPATIENT)
Dept: PHYSICAL THERAPY | Facility: CLINIC | Age: 54
End: 2019-10-31
Payer: COMMERCIAL

## 2019-10-31 DIAGNOSIS — M75.41 IMPINGEMENT SYNDROME OF RIGHT SHOULDER: Primary | ICD-10-CM

## 2019-10-31 DIAGNOSIS — M25.511 ACUTE PAIN OF RIGHT SHOULDER: ICD-10-CM

## 2019-10-31 DIAGNOSIS — M79.18 MYOFASCIAL PAIN WITH REFERRED PAIN: ICD-10-CM

## 2019-10-31 PROCEDURE — 97162 PT EVAL MOD COMPLEX 30 MIN: CPT | Performed by: PHYSICAL THERAPIST

## 2019-10-31 NOTE — PROGRESS NOTES
PT Evaluation     Today's date: 10/31/2019  Patient name: Arnold Fabian  : 1965  MRN: [id number]  Referring provider: Brandon Butts DO  Dx:   Encounter Diagnosis     ICD-10-CM    1  Impingement syndrome of right shoulder M75 41    2  Acute pain of right shoulder M25 511 Ambulatory referral to Physical Therapy   3  Myofascial pain with referred pain M79 18 Ambulatory referral to Physical Therapy                  Assessment  Assessment details: Patient is a 48 y o  female who presents with the above listed impairments  Patient would benefit from skilled PT services to address these impairments and to maximize function  Thank you for the referral     Impairments: abnormal muscle firing, abnormal or restricted ROM, abnormal movement, activity intolerance, impaired physical strength, lacks appropriate home exercise program, pain with function and scapular dyskinesis  Understanding of Dx/Px/POC: good   Prognosis: good    Goals  Impairment Goals  - Decrease pain by 50% in 3 weeks  - Increased PROM throughout right shoulder all planes to WNL in 3 weeks  - Increased AROM throughout right shoulder all planes to WNL in 6 weeks  - Increase right RTC and scapular stabilizer strength globally to 5/5 in 6 weeks      Functional Goals  - Return to Prior Level of Function in 6 weeks  - Patient will be independent with HEP in 6 weeks    Plan  Patient would benefit from: skilled PT  Planned modality interventions: cryotherapy and electrical stimulation/Russian stimulation  Planned therapy interventions: joint mobilization, manual therapy, neuromuscular re-education, patient education, strengthening, stretching, therapeutic activities, therapeutic exercise, home exercise program, functional ROM exercises and postural training  Frequency: 2x week (2-3x week)  Duration in weeks: 6  Treatment plan discussed with: patient, PTA and referring physician        Subjective Evaluation    History of Present Illness  Mechanism of injury: Patient reports ~ 1 month ago she started to have R shoulder pain when she was throwing a garbage bag into the dumpster  She reports her pain radiates into her biceps and into the upper trap  She denies any tingling or numbness  She reports sitting in the car or at her desk will increase her sxs  She notes that she has a hard time lying on her R side in bed without pain  She also notes pain with lifting smaller objects such a coffee mug  She does note some discomfort when reaching overhead and behind her back  Pt states she was prescribed robaxin but she is not going to take it  Rather she states she spays essential oils on her skin which she states helps her pain  Pt states she did receive lidocaine injection into the upper trap, which help some of her neck pain  Occupation: Desk job  PLOF: Independent   Pain  Current pain ratin  At best pain ratin  At worst pain ratin  Location: R Shoulder    Patient Goals  Patient goals for therapy: decreased pain, independence with ADLs/IADLs and return to sport/leisure activities          Objective     Neurological Testing     Sensation     Shoulder   Left Shoulder   Intact: light touch    Right Shoulder   Intact: light touch    Reflexes   Left   Deltoid (C5): normal (2+)  Biceps (C5/C6): normal (2+)  Brachioradialis (C6): normal (2+)  Triceps (C7): normal (2+)    Right   Deltoid (C5): normal (2+)  Biceps (C5/C6): normal (2+)  Brachioradialis (C6): normal (2+)  Triceps (C7): normal (2+)    Additional Neurological Details  UQS was negative  A forward head posture and rounded shoulders were noted  Patient was tender upon palpation at the right anterior shoulder      Active Range of Motion   Left Shoulder   Flexion: WFL  Abduction: WFL  External rotation BTH: WFL  Internal rotation BTB: T8 WFL  Horizontal adduction: WFL    Right Shoulder   Flexion: 165 degrees with pain  Abduction: 140 degrees with pain  External rotation 90°: 68 degrees  External rotation BTH: with pain  Internal rotation BTB: T12 with pain  Horizontal adduction: with pain    Passive Range of Motion   Left Shoulder   Flexion: WFL  Abduction: WFL  External rotation 0°: WFL  External rotation 45°: WFL  External rotation 90°: WFL  Internal rotation 45°: WFL  Internal rotation 90°: WFL    Right Shoulder   Flexion: 170 degrees with pain  Abduction: 169 degrees with pain  External rotation 90°: 88 degrees   Internal rotation 45°: 65 degrees   Internal rotation 90°: with pain    Additional Passive Range of Motion Details  Patient presents with a firm end feel throughout all planes  Scapular Mobility     Right Shoulder   Scapular Dyskinesis: grade III  Scapular mobility: WFL  Scapular Mobility with Shoulder to 90° FF   Scapular winging: minimal    Additional Scapular Mobility Details       Joint Play     Right Shoulder  Hypomobile in the anterior capsule, posterior capsule and inferior capsule  Strength/Myotome Testing     Left Shoulder   Normal muscle strength    Right Shoulder     Planes of Motion   Flexion: 4   Abduction: 4   External rotation at 90°: 4-     Isolated Muscles   Biceps: 4+   Infraspinatus: 4-   Latissimus: 4   Lower trapezius: 4-   Middle trapezius: 4   Rhomboids: 4   Serratus anterior: 4   Subscapularis: 4   Supraspinatus: 4   Triceps: 5   Upper trapezius: 5     Tests     Right Shoulder   Positive Hawkin's and painful arc  Negative belly press, drop arm, external rotation lag sign and lift-off         Flowsheet Rows      Most Recent Value   PT/OT G-Codes   Current Score  50   Projected Score  67   FOTO information reviewed  Yes             Diagnosis: R shoulder impingement with upper trap compensation   Precautions: Depression   Manuals 10/31       PROM        Mobs        IASTM                Exercise Diary        UBE        SL IR        SL ER        Bent over H-abd        Bent over Rows        Bent over Lower trap        Bent over Lats        Supine Punch        Scap Stab with towel on wall        Corner S        C/s DNF                                                                                        Modalities             CP and IFC PRN - shoulder/uppper trap

## 2019-11-06 ENCOUNTER — OFFICE VISIT (OUTPATIENT)
Dept: PHYSICAL THERAPY | Facility: CLINIC | Age: 54
End: 2019-11-06
Payer: COMMERCIAL

## 2019-11-06 DIAGNOSIS — M25.511 ACUTE PAIN OF RIGHT SHOULDER: Primary | ICD-10-CM

## 2019-11-06 DIAGNOSIS — M79.18 MYOFASCIAL PAIN WITH REFERRED PAIN: ICD-10-CM

## 2019-11-06 DIAGNOSIS — M75.41 IMPINGEMENT SYNDROME OF RIGHT SHOULDER: ICD-10-CM

## 2019-11-06 PROCEDURE — 97014 ELECTRIC STIMULATION THERAPY: CPT

## 2019-11-06 PROCEDURE — G0283 ELEC STIM OTHER THAN WOUND: HCPCS

## 2019-11-06 PROCEDURE — 97112 NEUROMUSCULAR REEDUCATION: CPT

## 2019-11-06 PROCEDURE — 97140 MANUAL THERAPY 1/> REGIONS: CPT

## 2019-11-06 PROCEDURE — 97110 THERAPEUTIC EXERCISES: CPT

## 2019-11-06 NOTE — PROGRESS NOTES
Daily Note     Today's date: 2019  Patient name: Raymond Lambert  : 1965  MRN: [id number]  Referring provider: Andrea Soni DO  Dx:   Encounter Diagnosis     ICD-10-CM    1  Acute pain of right shoulder M25 511    2  Myofascial pain with referred pain M79 18    3  Impingement syndrome of right shoulder M75 41                   Subjective: Patient reports 4/10 pain upon arrival  She states she is sitting a lot at work and the days she has to commute her pain is increased  She states she sprayed peppermint oil on her shoulder which helps her pain  Objective: See treatment diary below      Assessment:Initiated outlined program  PROM flex/abd with empty end feel  Difficulty activating scap; requires tactile cueing  Updated HEP with good understanding  Plan: Progress treatment as tolerated         Diagnosis: R shoulder impingement with upper trap compensation   Precautions: Depression   Manuals 10/31 11/6      PROM  KLS      Mobs        IASTM  KLS              Exercise Diary        UBE  2 5/2 5      SL IR        SL ER  0# 2x10       Bent over H-abd        Bent over Rows  scap retraction 2x10      Bent over Lower trap        Bent over Lats  0# 2x10       Supine Punch  0# 2x10       Scap Stab with towel on wall        Corner S  :20x5      C/s DNF  :03x20                                                                                       Modalities             CP and IFC PRN - shoulder/uppper trap  10 min

## 2019-11-07 ENCOUNTER — OFFICE VISIT (OUTPATIENT)
Dept: PHYSICAL THERAPY | Facility: CLINIC | Age: 54
End: 2019-11-07
Payer: COMMERCIAL

## 2019-11-07 DIAGNOSIS — M79.18 MYOFASCIAL PAIN WITH REFERRED PAIN: ICD-10-CM

## 2019-11-07 DIAGNOSIS — M75.41 IMPINGEMENT SYNDROME OF RIGHT SHOULDER: ICD-10-CM

## 2019-11-07 DIAGNOSIS — M25.511 ACUTE PAIN OF RIGHT SHOULDER: Primary | ICD-10-CM

## 2019-11-07 PROCEDURE — 97140 MANUAL THERAPY 1/> REGIONS: CPT

## 2019-11-07 PROCEDURE — 97112 NEUROMUSCULAR REEDUCATION: CPT

## 2019-11-07 PROCEDURE — G0283 ELEC STIM OTHER THAN WOUND: HCPCS

## 2019-11-07 PROCEDURE — 97110 THERAPEUTIC EXERCISES: CPT

## 2019-11-07 PROCEDURE — 97014 ELECTRIC STIMULATION THERAPY: CPT

## 2019-11-07 NOTE — PROGRESS NOTES
Daily Note     Today's date: 2019  Patient name: Sana Capps  : 1965  MRN: [id number]  Referring provider: Helen Rossi DO  Dx:   Encounter Diagnosis     ICD-10-CM    1  Acute pain of right shoulder M25 511    2  Myofascial pain with referred pain M79 18    3  Impingement syndrome of right shoulder M75 41                   Subjective: Patient states she felt better following last session  She was able to sleep last night for the first time which she is pleased about  Objective: See treatment diary below      Assessment: Continued with outlined program  PROM slowly improving  Pain with prone low trap; modified with sidelying flexion without exacerbating pain symptoms  Cued for scap activation with strengthening exercises  Plan: Progress treatment as tolerated         Diagnosis: R shoulder impingement with upper trap compensation   Precautions: Depression   Manuals 10/31 11/6 11/7     PROM  KLS KLS     Mobs   CMF     IASTM  KLS KLS             Exercise Diary        UBE  2 5/2 5 2 5/2 5     SL IR        SL ER  0# 2x10  0# 3x10      Bent over H-abd        Bent over Rows  scap retraction 2x10 scap retraction 2x10     Bent over Lower trap   SL flexion 2x10     Bent over Lats  0# 2x10  0# 2x10     Supine Punch  0# 2x10  0# :03x20      Scap Stab with towel on wall        Corner S  :20x5 :20x5     C/s DNF  :03x20  :05x20                                                                                      Modalities             CP and IFC PRN - shoulder/uppper trap  10 min  10 min

## 2019-11-08 ENCOUNTER — APPOINTMENT (OUTPATIENT)
Dept: PHYSICAL THERAPY | Facility: CLINIC | Age: 54
End: 2019-11-08
Payer: COMMERCIAL

## 2019-11-11 ENCOUNTER — APPOINTMENT (OUTPATIENT)
Dept: PHYSICAL THERAPY | Facility: CLINIC | Age: 54
End: 2019-11-11
Payer: COMMERCIAL

## 2019-11-13 ENCOUNTER — OFFICE VISIT (OUTPATIENT)
Dept: PHYSICAL THERAPY | Facility: CLINIC | Age: 54
End: 2019-11-13
Payer: COMMERCIAL

## 2019-11-13 DIAGNOSIS — M25.511 ACUTE PAIN OF RIGHT SHOULDER: Primary | ICD-10-CM

## 2019-11-13 DIAGNOSIS — M75.41 IMPINGEMENT SYNDROME OF RIGHT SHOULDER: ICD-10-CM

## 2019-11-13 DIAGNOSIS — M79.18 MYOFASCIAL PAIN WITH REFERRED PAIN: ICD-10-CM

## 2019-11-13 PROCEDURE — 97014 ELECTRIC STIMULATION THERAPY: CPT

## 2019-11-13 PROCEDURE — 97110 THERAPEUTIC EXERCISES: CPT

## 2019-11-13 PROCEDURE — G0283 ELEC STIM OTHER THAN WOUND: HCPCS

## 2019-11-13 PROCEDURE — 97140 MANUAL THERAPY 1/> REGIONS: CPT

## 2019-11-13 PROCEDURE — 97112 NEUROMUSCULAR REEDUCATION: CPT

## 2019-11-13 NOTE — PROGRESS NOTES
Daily Note     Today's date: 2019  Patient name: Missy Sanchez  : 1965  MRN: [id number]  Referring provider: [place] Pitt, DO  Dx:   Encounter Diagnosis     ICD-10-CM    1  Acute pain of right shoulder M25 511    2  Myofascial pain with referred pain M79 18    3  Impingement syndrome of right shoulder M75 41                   Subjective: Patient states she was compliant with HEP while away on vacation  She notes being unable to sleep in the hospital bed which she feels did not help her pain symptoms  She had a lot of popping and clicking yesterday  She states she was able to sleep last night while in her own bed  Objective: See treatment diary below      Assessment: Continued with outlined program  Patient with difficulty PROM shoulder elevation to begin; however improved slowly  Progressed within patient tolerance with strengthening exercises  She notes discomfort in anterior shoulder with bent over lats  Decrease tenderness with IASTM to R UT with IASTM  Plan: Progress treatment as tolerated         Diagnosis: R shoulder impingement with upper trap compensation   Precautions: Depression   Manuals 10/31 11/6 11/7 11/13    PROM  KLS KLS KLS    Mobs   CMF CMF    IASTM  KLS KLS KLS            Exercise Diary        UBE  2 5/2 5 2 5/2 5 2 5/2 5    SL IR        SL ER  0# 2x10  0# 3x10  0# 3x10    Bent over H-abd        Bent over Rows  scap retraction 2x10 scap retraction 2x10 scap retraction 2x10    Bent over Lower trap   SL flexion 2x10 Sl flexion 3x10    Bent over Lats  0# 2x10  0# 2x10 0# 2x10    Supine Punch  0# 2x10  0# :03x20  1# :03x20     Scap Stab with towel on wall        Corner S  :20x5 :20x5 :20x5    C/s DNF  :03x20  :05x20  :05x20                                                                                     Modalities             CP and IFC PRN - shoulder/uppper trap  10 min  10 min  10 min

## 2019-11-14 ENCOUNTER — OFFICE VISIT (OUTPATIENT)
Dept: PHYSICAL THERAPY | Facility: CLINIC | Age: 54
End: 2019-11-14
Payer: COMMERCIAL

## 2019-11-14 DIAGNOSIS — M79.18 MYOFASCIAL PAIN WITH REFERRED PAIN: ICD-10-CM

## 2019-11-14 DIAGNOSIS — M25.511 ACUTE PAIN OF RIGHT SHOULDER: Primary | ICD-10-CM

## 2019-11-14 DIAGNOSIS — M75.41 IMPINGEMENT SYNDROME OF RIGHT SHOULDER: ICD-10-CM

## 2019-11-14 PROCEDURE — 97112 NEUROMUSCULAR REEDUCATION: CPT

## 2019-11-14 PROCEDURE — 97110 THERAPEUTIC EXERCISES: CPT

## 2019-11-14 PROCEDURE — 97140 MANUAL THERAPY 1/> REGIONS: CPT

## 2019-11-14 NOTE — PROGRESS NOTES
Daily Note     Today's date: 2019  Patient name: Beata Allen  : 1965  MRN: [id number]  Referring provider: Matilda Guillen DO  Dx:   Encounter Diagnosis     ICD-10-CM    1  Acute pain of right shoulder M25 511    2  Myofascial pain with referred pain M79 18    3  Impingement syndrome of right shoulder M75 41                   Subjective: Patient states she drove to work yesterday in the car for over an hour and a half with pinching in her neck and lots of pain  Objective: See treatment diary below      Assessment: Patient presents to therapy with excessive UT compensation  Manual UT stretching performed with noted relief  Trialed scap retraction taping technique from bilateral UTs with noticeable improved posture and immediate decrease pain symptoms  Will assess symptoms next visit  Plan: Progress treatment as tolerated         Diagnosis: R shoulder impingement with upper trap compensation   Precautions: Depression   Manuals 10/31 11/6 11/7 11/13 11/14   PROM  KLS KLS KLS KLS   Mobs   CMF CMF    IASTM  KLS KLS KLS    Scap retraction taping      KLS    Exercise Diary        UBE  2 5/2 5 2 5/2 5 2 5/2 5 2 5/2 5   SL IR        SL ER  0# 2x10  0# 3x10  0# 3x10 1# 3x10    Bent over H-abd        Bent over Rows  scap retraction 2x10 scap retraction 2x10 scap retraction 2x10    Bent over Lower trap   SL flexion 2x10 Sl flexion 3x10 SL flexion 3x10    Bent over Lats  0# 2x10  0# 2x10 0# 2x10    Supine Punch  0# 2x10  0# :03x20  1# :03x20  1# :03x20    Scap Stab with towel on wall        Corner S  :20x5 :20x5 :20x5 :20x5    C/s DNF  :03x20  :05x20  :05x20  :05x20                                                                                    Modalities             CP and IFC PRN - shoulder/uppper trap  10 min  10 min  10 min  NP

## 2019-11-15 ENCOUNTER — APPOINTMENT (OUTPATIENT)
Dept: PHYSICAL THERAPY | Facility: CLINIC | Age: 54
End: 2019-11-15
Payer: COMMERCIAL

## 2019-11-20 ENCOUNTER — OFFICE VISIT (OUTPATIENT)
Dept: PHYSICAL THERAPY | Facility: CLINIC | Age: 54
End: 2019-11-20
Payer: COMMERCIAL

## 2019-11-20 DIAGNOSIS — M79.18 MYOFASCIAL PAIN WITH REFERRED PAIN: Primary | ICD-10-CM

## 2019-11-20 DIAGNOSIS — M75.41 IMPINGEMENT SYNDROME OF RIGHT SHOULDER: ICD-10-CM

## 2019-11-20 DIAGNOSIS — M25.511 ACUTE PAIN OF RIGHT SHOULDER: ICD-10-CM

## 2019-11-20 PROCEDURE — 97140 MANUAL THERAPY 1/> REGIONS: CPT

## 2019-11-20 PROCEDURE — G0283 ELEC STIM OTHER THAN WOUND: HCPCS

## 2019-11-20 PROCEDURE — 97110 THERAPEUTIC EXERCISES: CPT

## 2019-11-20 PROCEDURE — 97014 ELECTRIC STIMULATION THERAPY: CPT

## 2019-11-20 NOTE — PROGRESS NOTES
Daily Note     Today's date: 2019  Patient name: Nunzio Kocher  : 1965  MRN: [id number]  Referring provider: Sy Vargas DO  Dx:   Encounter Diagnosis     ICD-10-CM    1  Myofascial pain with referred pain M79 18    2  Acute pain of right shoulder M25 511    3  Impingement syndrome of right shoulder M75 41                   Subjective: Patient states she felt great following last session  She felt the tape really helped her with posture and assisted with her overall pain symptoms  Objective: See treatment diary below      Assessment: Continued with outlined program  She was able to perform exercises as noted with moderate muscle fatigue' cues for scap retraction  Will resume taping next session  Plan: Progress treatment as tolerated         Diagnosis: R shoulder impingement with upper trap compensation   Precautions: Depression   Manuals    PROM KLS  KLS KLS KLS   Mobs   CMF CMF    IASTM KLS  KLS KLS    Scap retraction taping  NPV    KLS    Exercise Diary        UBE 2 5/2 5  2 5/2 5 2 5/2 5 2 5/2 5   SL IR        SL ER 1# 3x10  0# 3x10  0# 3x10 1# 3x10    Bent over H-abd 0# 10x        Bent over Rows 3# 3x10   scap retraction 2x10 scap retraction 2x10    Bent over Lower trap SL Flex 3x10  SL flexion 2x10 Sl flexion 3x10 SL flexion 3x10    Bent over Lats 1# 2x10   0# 2x10 0# 2x10    Supine Punch 3# 3x10  0# :03x20  1# :03x20  1# :03x20    Scap Stab with towel on wall        Corner S :20x5  :20x5 :20x5 :20x5    C/s DNF :05x20   :05x20  :05x20  :05x20                                                                                Modalities            CP and IFC PRN - shoulder/uppper trap 10 min   10 min  10 min  NP

## 2019-11-21 ENCOUNTER — OFFICE VISIT (OUTPATIENT)
Dept: PHYSICAL THERAPY | Facility: CLINIC | Age: 54
End: 2019-11-21
Payer: COMMERCIAL

## 2019-11-21 DIAGNOSIS — M79.18 MYOFASCIAL PAIN WITH REFERRED PAIN: Primary | ICD-10-CM

## 2019-11-21 DIAGNOSIS — M75.41 IMPINGEMENT SYNDROME OF RIGHT SHOULDER: ICD-10-CM

## 2019-11-21 DIAGNOSIS — M25.511 ACUTE PAIN OF RIGHT SHOULDER: ICD-10-CM

## 2019-11-21 PROCEDURE — 97110 THERAPEUTIC EXERCISES: CPT

## 2019-11-21 PROCEDURE — 97140 MANUAL THERAPY 1/> REGIONS: CPT

## 2019-11-21 PROCEDURE — 97112 NEUROMUSCULAR REEDUCATION: CPT

## 2019-11-21 NOTE — PROGRESS NOTES
Daily Note     Today's date: 2019  Patient name: Иван Maurice  : 1965  MRN: [id number]  Referring provider: Meño Gerard DO  Dx:   Encounter Diagnosis     ICD-10-CM    1  Myofascial pain with referred pain M79 18    2  Acute pain of right shoulder M25 511    3  Impingement syndrome of right shoulder M75 41                   Subjective: Patient states she has noticed an improvement, especially with posture  She has pain along the front and side of her bicep  Objective: See treatment diary below      Assessment: Continued with outlined program  She demonstrates improved tolerance to LE strengthening exercises  Patient taped with improved scap retraction and relaxation of UTs  Assess symptoms next visit  Plan: Progress treatment as tolerated         Diagnosis: R shoulder impingement with upper trap compensation   Precautions: Depression   Manuals    PROM KLS KLS  KLS KLS   Mobs    CMF    IASTM KLS KLS  KLS KLS    Scap retraction taping  NPV    KLS    Exercise Diary        UBE 2 5/2 5 2 5/2 5  2 5/2 5 2 5/2 5   SL IR        SL ER 1# 3x10 1# 3x10  0# 3x10 1# 3x10    Bent over H-abd 0# 10x  0# 10x      Bent over Rows 3# 3x10  3# 3x10  scap retraction 2x10    Bent over Lower trap SL Flex 3x10 SL flex 3x10   Sl flexion 3x10 SL flexion 3x10    Bent over Lats 1# 2x10  3# 3x10  0# 2x10    Supine Punch 3# 3x10 4# 2x10  1# :03x20  1# :03x20    Scap Stab with towel on wall        Corner S :20x5 :20x5   :20x5 :20x5    C/s DNF :05x20  :05x20   :05x20  :05x20                                                                            Modalities           CP and IFC PRN - shoulder/uppper trap 10 min  def  10 min  NP

## 2019-11-26 ENCOUNTER — OFFICE VISIT (OUTPATIENT)
Dept: PHYSICAL THERAPY | Facility: CLINIC | Age: 54
End: 2019-11-26
Payer: COMMERCIAL

## 2019-11-26 DIAGNOSIS — M25.511 ACUTE PAIN OF RIGHT SHOULDER: ICD-10-CM

## 2019-11-26 DIAGNOSIS — M79.18 MYOFASCIAL PAIN WITH REFERRED PAIN: Primary | ICD-10-CM

## 2019-11-26 DIAGNOSIS — M75.41 IMPINGEMENT SYNDROME OF RIGHT SHOULDER: ICD-10-CM

## 2019-11-26 PROCEDURE — 97014 ELECTRIC STIMULATION THERAPY: CPT

## 2019-11-26 PROCEDURE — 97140 MANUAL THERAPY 1/> REGIONS: CPT

## 2019-11-26 PROCEDURE — G0283 ELEC STIM OTHER THAN WOUND: HCPCS

## 2019-11-26 PROCEDURE — 97110 THERAPEUTIC EXERCISES: CPT

## 2019-11-26 PROCEDURE — 97112 NEUROMUSCULAR REEDUCATION: CPT

## 2019-11-26 NOTE — PROGRESS NOTES
Daily Note     Today's date: 2019  Patient name: Beata Allen  : 1965  MRN: [id number]  Referring provider: Matilda Guillen DO  Dx:   Encounter Diagnosis     ICD-10-CM    1  Myofascial pain with referred pain M79 18    2  Acute pain of right shoulder M25 511    3  Impingement syndrome of right shoulder M75 41                   Subjective: Patient arrives to therapy, stating her underline pain such as neck pain has subsided, less tightness and she has been working on her posture  She states she has pain, describing her bicep, that she is unable to relate to any specific motion  She states she just randomly has sharp pain, or "locking" which prevents her from moving her arm for a few seconds  Objective: See treatment diary below      Assessment: Continued with outlined program  She demonstrates improved tolerance to strengthening exercises  She was able to perform low trap and h-abd with cueing for scap activation, improved motion and less discomfort  Initiated scapular stabilization with resistance with moderate muscle fatigue  Updated HEP  Plan: Progress treatment as tolerated         Diagnosis: R shoulder impingement with upper trap compensation   Precautions: Depression   Manuals    PROM KLS KLS KLS  KLS   Mobs        IASTM KLS KLS   KLS    Scap retraction taping  NPV    KLS    Exercise Diary        UBE 2 5/2 5 2 5/2 5 2 5/2 5  2 5/2 5   SL IR        SL ER 1# 3x10 1# 3x10 1# 3x10   1# 3x10    Bent over H-abd 0# 10x  0# 10x 0# 2x10      Bent over Rows 3# 3x10  3# 3x10 4# 2x10     Bent over Lower trap SL Flex 3x10 SL flex 3x10  0# 2x10   SL flexion 3x10    Bent over Lats 1# 2x10  3# 3x10 3# 3x10     Supine Punch 3# 3x10 4# 2x10 5# 2x10  1# :03x20    Scap Stab with towel on wall        Corner S :20x5 :20x5    :20x5    C/s DNF :05x20  :05x20  :05x20   :05x20    Scap stab with Tband   RTB :20x4                                                                  Modalities       CP and IFC PRN - shoulder/uppper trap 10 min  def 10 min   NP

## 2019-11-29 ENCOUNTER — OFFICE VISIT (OUTPATIENT)
Dept: PHYSICAL THERAPY | Facility: CLINIC | Age: 54
End: 2019-11-29
Payer: COMMERCIAL

## 2019-11-29 DIAGNOSIS — M79.18 MYOFASCIAL PAIN WITH REFERRED PAIN: Primary | ICD-10-CM

## 2019-11-29 DIAGNOSIS — M25.511 ACUTE PAIN OF RIGHT SHOULDER: ICD-10-CM

## 2019-11-29 PROCEDURE — 97110 THERAPEUTIC EXERCISES: CPT

## 2019-11-29 PROCEDURE — 97140 MANUAL THERAPY 1/> REGIONS: CPT

## 2019-11-29 PROCEDURE — 97112 NEUROMUSCULAR REEDUCATION: CPT

## 2019-11-29 NOTE — PROGRESS NOTES
Daily Note     Today's date: 2019  Patient name: Clive Fenton  : 1965  MRN: [id number]  Referring provider: Choco Ware DO  Dx:   Encounter Diagnosis     ICD-10-CM    1  Myofascial pain with referred pain M79 18    2  Acute pain of right shoulder M25 511                   Subjective: Pt reports her neck has been pain free, continues to have c/o pain in right delt  Objective: See treatment diary below  Diagnosis: R shoulder impingement with upper trap compensation   Precautions: Depression   Manuals    PROM KLS KLS KLS HS KLS   Mobs        IASTM KLS KLS   KLS    Scap retraction taping  NPV    KLS    Exercise Diary        UBE 2 5/2 5 2 5/2 5 2 5/2 5 2 5/2 5 2 5/2 5   SL IR        SL ER 1# 3x10 1# 3x10 1# 3x10  1# 3x10 1# 3x10    Bent over H-abd 0# 10x  0# 10x 0# 2x10  0# 2x10    Bent over Rows 3# 3x10  3# 3x10 4# 2x10 4# 2x10    Bent over Lower trap SL Flex 3x10 SL flex 3x10  0# 2x10  0# 2x10 SL flexion 3x10    Bent over Lats 1# 2x10  3# 3x10 3# 3x10 3# 3x10    Supine Punch 3# 3x10 4# 2x10 5# 2x10 5# 2x10 1# :03x20    Scap Stab with towel on wall        Corner S :20x5 :20x5    :20x5    C/s DNF :05x20  :05x20  :05x20  5"x20 :05x20    Scap stab with Tband   RTB :20x4  RTB 20"x4                                                                Modalities          CP and IFC PRN - shoulder/uppper trap 10 min  def 10 min   NP                                  Assessment: Fatigued post tx, and some muscle guarding with PROM, reports no increase in pain during or post session  Plan: Progress treatment as tolerated

## 2019-12-02 ENCOUNTER — EVALUATION (OUTPATIENT)
Dept: PHYSICAL THERAPY | Facility: CLINIC | Age: 54
End: 2019-12-02
Payer: COMMERCIAL

## 2019-12-02 DIAGNOSIS — M75.41 IMPINGEMENT SYNDROME OF RIGHT SHOULDER: ICD-10-CM

## 2019-12-02 DIAGNOSIS — M25.511 ACUTE PAIN OF RIGHT SHOULDER: ICD-10-CM

## 2019-12-02 DIAGNOSIS — M79.18 MYOFASCIAL PAIN WITH REFERRED PAIN: Primary | ICD-10-CM

## 2019-12-02 PROCEDURE — 97110 THERAPEUTIC EXERCISES: CPT | Performed by: PHYSICAL THERAPIST

## 2019-12-02 PROCEDURE — 97112 NEUROMUSCULAR REEDUCATION: CPT | Performed by: PHYSICAL THERAPIST

## 2019-12-02 PROCEDURE — 97140 MANUAL THERAPY 1/> REGIONS: CPT | Performed by: PHYSICAL THERAPIST

## 2019-12-02 NOTE — PROGRESS NOTES
PT Re-evaluation    Today's date: 2019  Patient name: Namrata Dasilva  : 1965  MRN: [id number]  Referring provider: Lg Mendoza DO  Dx:   Encounter Diagnosis     ICD-10-CM    1  Myofascial pain with referred pain M79 18    2  Acute pain of right shoulder M25 511    3  Impingement syndrome of right shoulder M75 41                   Assessment  Assessment details: Patient is a 48 y o  female who presents with the above listed impairments  Patient would benefit from skilled PT services to address these impairments and to maximize function  Thank you for the referral     Impairments: abnormal muscle firing, abnormal or restricted ROM, abnormal movement, activity intolerance, impaired physical strength, pain with function and scapular dyskinesis  Understanding of Dx/Px/POC: good   Prognosis: good    Goals  Impairment Goals  - Decrease pain by 50% in 3 weeks  - progressing  - Increased PROM throughout right shoulder all planes to WNL in 3 weeks  - progressing  - Increased AROM throughout right shoulder all planes to WNL in 6 weeks  - progressing  - Increase right RTC and scapular stabilizer strength globally to 5/5 in 6 weeks  - progressing    Functional Goals  - Return to Prior Level of Function in 6 weeks - progressing  - Patient will be independent with HEP in 6 weeks - progressing    Plan  Patient would benefit from: skilled PT  Planned modality interventions: cryotherapy and electrical stimulation/Russian stimulation  Planned therapy interventions: joint mobilization, manual therapy, neuromuscular re-education, patient education, strengthening, stretching, therapeutic activities, therapeutic exercise, home exercise program, functional ROM exercises and postural training  Frequency: 2x week (2-3x week)  Duration in weeks: 4  Treatment plan discussed with: patient, PTA and referring physician        Subjective Evaluation    History of Present Illness  Mechanism of injury: Pt reports a 65% improvement to date   She notes an improvement with upper trap pain  She reports notes she is still having shoulder pain that aches but it is "different" than what is was before  She notes her pain will start have reaching to pull down her pants or reaching across her body  Pain  Current pain ratin  At best pain ratin  At worst pain ratin  Location: R Shoulder  Quality: dull ache and sharp    Patient Goals  Patient goals for therapy: decreased pain, independence with ADLs/IADLs and return to sport/leisure activities          Objective     Neurological Testing     Sensation     Shoulder   Left Shoulder   Intact: light touch    Right Shoulder   Intact: light touch    Reflexes   Left   Deltoid (C5): normal (2+)  Biceps (C5/C6): normal (2+)  Brachioradialis (C6): normal (2+)  Triceps (C7): normal (2+)    Right   Deltoid (C5): normal (2+)  Biceps (C5/C6): normal (2+)  Brachioradialis (C6): normal (2+)  Triceps (C7): normal (2+)    Additional Neurological Details  UQS was negative  A forward head posture and rounded shoulders were noted  Patient is  upon palpation at the right anterior shoulder      Active Range of Motion   Left Shoulder   Flexion: WFL  Abduction: WFL  External rotation BTH: WFL  Internal rotation BTB: T8 WFL  Horizontal adduction: WFL    Right Shoulder   Flexion: 170 degrees with pain  Abduction: 160 degrees with pain  External rotation 90°: 76 degrees  External rotation BTH: with pain  Internal rotation BTB: T11 with pain  Horizontal adduction: with pain    Passive Range of Motion   Left Shoulder   Flexion: WFL  Abduction: WFL  External rotation 0°: WFL  External rotation 45°: WFL  External rotation 90°: WFL  Internal rotation 45°: WFL  Internal rotation 90°: WFL    Right Shoulder   Flexion: 170 degrees with pain  Abduction: 170 degrees with pain  External rotation 90°: 90 degrees   Internal rotation 45°: 70 degrees   Internal rotation 90°: with pain    Additional Passive Range of Motion Details  Patient presents with a firm end feel throughout all planes  Scapular Mobility     Right Shoulder   Scapular Dyskinesis: grade I  Scapular mobility: WFL  Scapular Mobility with Shoulder to 90° FF   Scapular winging: minimal    Additional Scapular Mobility Details       Joint Play     Right Shoulder  Hypomobile in the anterior capsule, posterior capsule and inferior capsule  Strength/Myotome Testing     Left Shoulder   Normal muscle strength    Right Shoulder     Planes of Motion   Flexion: 4+   Abduction: 4+   External rotation at 90°: 4     Isolated Muscles   Biceps: 4+   Infraspinatus: 4   Latissimus: 4+   Lower trapezius: 4-   Middle trapezius: 4   Rhomboids: 4   Serratus anterior: 4   Subscapularis: 4+   Supraspinatus: 4+   Triceps: 5   Upper trapezius: 5     Tests     Right Shoulder   Positive Hawkin's and painful arc  Negative belly press, drop arm, external rotation lag sign and lift-off                 Diagnosis: R shoulder impingement with upper trap compensation   Precautions: Depression   Manuals 11/20 11/21 11/26 11/29 12/2   PROM KLS KLS KLS HS CMF   Mobs     CMF   IASTM KLS KLS      Re-eval     CMF   Scap retraction taping  NPV       Exercise Diary        UBE 2 5/2 5 2 5/2 5 2 5/2 5 2 5/2 5 2 5/2 5   SL IR        SL ER 1# 3x10 1# 3x10 1# 3x10  1# 3x10 1# 3x10    Bent over H-abd 0# 10x  0# 10x 0# 2x10  0# 2x10 0# 2x10   Bent over Rows 3# 3x10  3# 3x10 4# 2x10 4# 2x10 4# 2x10   Bent over Lower trap SL Flex 3x10 SL flex 3x10  0# 2x10  0# 2x10 SL flexion 3x10 0#    Bent over Lats 1# 2x10  3# 3x10 3# 3x10 3# 3x10 3# 3x10   Supine Punch 3# 3x10 4# 2x10 5# 2x10 5# 2x10 5# :03x20    Scap Stab with towel on wall        Corner S :20x5 :20x5    :20x5    C/s DNF :05x20  :05x20  :05x20  5"x20 :05x20    Scap stab with Tband   RTB :20x4  RTB 20"x4 RTB 20'x4                                                               Modalities          CP and IFC PRN - shoulder/uppper trap 10 min  def 10 min   Def

## 2019-12-05 ENCOUNTER — OFFICE VISIT (OUTPATIENT)
Dept: PHYSICAL THERAPY | Facility: CLINIC | Age: 54
End: 2019-12-05
Payer: COMMERCIAL

## 2019-12-05 DIAGNOSIS — M75.41 IMPINGEMENT SYNDROME OF RIGHT SHOULDER: ICD-10-CM

## 2019-12-05 DIAGNOSIS — M79.18 MYOFASCIAL PAIN WITH REFERRED PAIN: Primary | ICD-10-CM

## 2019-12-05 DIAGNOSIS — M25.511 ACUTE PAIN OF RIGHT SHOULDER: ICD-10-CM

## 2019-12-05 PROCEDURE — 97112 NEUROMUSCULAR REEDUCATION: CPT

## 2019-12-05 PROCEDURE — 97110 THERAPEUTIC EXERCISES: CPT

## 2019-12-05 NOTE — PROGRESS NOTES
Daily Note     Today's date: 2019  Patient name: Gordan Schwab  : 1965  MRN: [id number]  Referring provider: Rogelio Dickerson DO  Dx:   Encounter Diagnosis     ICD-10-CM    1  Myofascial pain with referred pain M79 18    2  Acute pain of right shoulder M25 511    3  Impingement syndrome of right shoulder M75 41                   Subjective: Patient states she still has some locking up in her R shoulder and tenderness at anterior shoulder  She states when she was pouring her coffee, having to hold and pour, increased her pain symptoms  She states she slept well last night  Objective: See treatment diary below      Assessment: Continued with outlined program  No complaints of pain with PROM in all planes  She was able to resume bent over low trap without pain symptoms  Increased reps and resistance with moderate muscle fatigue  Plan: Progress treatment as tolerated         Diagnosis: R shoulder impingement with upper trap compensation   Precautions: Depression   Manuals    PROM KLS  KLS HS CMF   Mobs     CMF   IASTM        Re-eval     CMF   Scap retraction taping         Exercise Diary        UBE 2 5/ 5  2 5/2 5 2 5/2 5 2 5/2 5   SL IR        SL ER 1# 3x10  1# 3x10  1# 3x10 1# 3x10    Bent over H-abd 0# 2x10   0# 2x10  0# 2x10 0# 2x10   Bent over Rows 5# 3x10  4# 2x10 4# 2x10 4# 2x10   Bent over Lower trap 0# 2x10   0# 2x10  0# 2x10 SL flexion 3x10 0#    Bent over Lats 3# 3x10   3# 3x10 3# 3x10 3# 3x10   Supine Punch 6# :03x20   5# 2x10 5# 2x10 5# :03x20    Scap Stab with towel on wall        Corner S     :20x5    C/s DNF --  :05x20  5"x20 :05x20    Scap stab with Tband RTB :20x4   RTB :20x4  RTB 20"x4 RTB 20'x4                                                           Modalities         CP and IFC PRN - shoulder/uppper trap def  10 min   Def

## 2019-12-09 ENCOUNTER — OFFICE VISIT (OUTPATIENT)
Dept: PHYSICAL THERAPY | Facility: CLINIC | Age: 54
End: 2019-12-09
Payer: COMMERCIAL

## 2019-12-09 DIAGNOSIS — M79.18 MYOFASCIAL PAIN WITH REFERRED PAIN: Primary | ICD-10-CM

## 2019-12-09 DIAGNOSIS — M75.41 IMPINGEMENT SYNDROME OF RIGHT SHOULDER: ICD-10-CM

## 2019-12-09 DIAGNOSIS — M25.511 ACUTE PAIN OF RIGHT SHOULDER: ICD-10-CM

## 2019-12-09 PROCEDURE — 97140 MANUAL THERAPY 1/> REGIONS: CPT

## 2019-12-09 PROCEDURE — 97112 NEUROMUSCULAR REEDUCATION: CPT

## 2019-12-09 PROCEDURE — 97110 THERAPEUTIC EXERCISES: CPT

## 2019-12-09 NOTE — PROGRESS NOTES
Daily Note     Today's date: 2019  Patient name: Carlos Enrique Coffman  : 1965  MRN: [id number]  Referring provider: Riley Conner DO  Dx:   Encounter Diagnosis     ICD-10-CM    1  Myofascial pain with referred pain M79 18    2  Acute pain of right shoulder M25 511    3  Impingement syndrome of right shoulder M75 41                   Subjective: Patient states she has been feeling really good  She notes minimal discomfort in her R shoulder and no neck pain  She notes she has been able to correct her posture while seated  Objective: See treatment diary below      Assessment: Continued with outlined program  Moderate muscle fatigue with strengthening exercises  PROM improving with less discomfort  Plan: Progress treatment as tolerated         Diagnosis: R shoulder impingement with upper trap compensation   Precautions: Depression   Manuals    PROM KLS KLS  HS CMF   Mobs     CMF   IASTM        Re-eval     CMF   Scap retraction taping         Exercise Diary        UBE 2 5/ 5 2 5/2 5  2 5/2 5 2 5/2 5   SL IR  1# 3x10      SL ER 1# 3x10 2# 2x10   1# 3x10 1# 3x10    Bent over H-abd 0# 2x10  0# 3x10  0# 2x10 0# 2x10   Bent over Rows 5# 3x10 6# 3x10  4# 2x10 4# 2x10   Bent over Lower trap 0# 2x10  0# 3x10   0# 2x10 SL flexion 3x10 0#    Bent over Lats 3# 3x10  4# 3x10  3# 3x10 3# 3x10   Supine Punch 6# :03x20  7# :03x20   5# 2x10 5# :03x20    Scap Stab with towel on wall        Corner S     :20x5    C/s DNF --   5"x20 :05x20    Scap stab with Tband RTB :20x4  RTB :30x4   RTB 20"x4 RTB 20'x4                                                           Modalities         CP and IFC PRN - shoulder/uppper trap def def   Def

## 2019-12-12 ENCOUNTER — OFFICE VISIT (OUTPATIENT)
Dept: PHYSICAL THERAPY | Facility: CLINIC | Age: 54
End: 2019-12-12
Payer: COMMERCIAL

## 2019-12-12 DIAGNOSIS — M25.511 ACUTE PAIN OF RIGHT SHOULDER: ICD-10-CM

## 2019-12-12 DIAGNOSIS — M75.41 IMPINGEMENT SYNDROME OF RIGHT SHOULDER: ICD-10-CM

## 2019-12-12 DIAGNOSIS — M79.18 MYOFASCIAL PAIN WITH REFERRED PAIN: Primary | ICD-10-CM

## 2019-12-12 PROCEDURE — 97140 MANUAL THERAPY 1/> REGIONS: CPT | Performed by: PHYSICAL THERAPIST

## 2019-12-12 PROCEDURE — 97110 THERAPEUTIC EXERCISES: CPT | Performed by: PHYSICAL THERAPIST

## 2019-12-12 PROCEDURE — 97112 NEUROMUSCULAR REEDUCATION: CPT | Performed by: PHYSICAL THERAPIST

## 2019-12-12 NOTE — PROGRESS NOTES
Daily Note     Today's date: 2019  Patient name: Sweta Rich  : 1965  MRN: [id number]  Referring provider: Viri Flores DO  Dx:   Encounter Diagnosis     ICD-10-CM    1  Myofascial pain with referred pain M79 18    2  Acute pain of right shoulder M25 511    3  Impingement syndrome of right shoulder M75 41                   Subjective: Patient reports an overall 80% improvement to date  Objective: See treatment diary below      Assessment: Continued with outlined program  Was able to progress with moderate fatigue noted and no increase of sxs  Plan: Progress treatment as tolerated         Diagnosis: R shoulder impingement with upper trap compensation   Precautions: Depression   Manuals    PROM KLS KLS CMF HS CMF   Mobs   CMF  CMF   IASTM        Re-eval     CMF   Scap retraction taping         Exercise Diary        UBE 2 5/2 5 2 5/2 5 2 5/ 5 2 5/2 5 2 5/ 5   SL IR  1# 3x10 3# 3x10     SL ER 1# 3x10 2# 2x10  2# 3x10 1# 3x10 1# 3x10    Bent over H-abd 0# 2x10  0# 3x10 0# 3x12 0# 2x10 0# 2x10   Bent over Rows 5# 3x10 6# 3x10 6# 3x10 4# 2x10 4# 2x10   Bent over Lower trap 0# 2x10  0# 3x10  0# 3x12 0# 2x10 SL flexion 3x10 0#    Bent over Lats 3# 3x10  4# 3x10 5# 2x10 3# 3x10 3# 3x10   Supine Punch 6# :03x20  7# :03x20  7# :03x20 5# 2x10 5# :03x20    Scap Stab with towel on wall        Corner S     :20x5    C/s DNF --   5"x20 :05x20    Scap stab with Tband RTB :20x4  RTB :30x4  RTB :30x4 RTB 20"x4 RTB 20'x4   90/90 ER   0# 1x10                                                     Modalities         CP and IFC PRN - shoulder/uppper trap def def   Def

## 2019-12-16 ENCOUNTER — APPOINTMENT (OUTPATIENT)
Dept: PHYSICAL THERAPY | Facility: CLINIC | Age: 54
End: 2019-12-16
Payer: COMMERCIAL

## 2019-12-17 RX ORDER — ALBUTEROL SULFATE 90 UG/1
1-2 AEROSOL, METERED RESPIRATORY (INHALATION)
COMMUNITY
Start: 2018-01-12 | End: 2021-05-19

## 2019-12-18 ENCOUNTER — OFFICE VISIT (OUTPATIENT)
Dept: OBGYN CLINIC | Facility: CLINIC | Age: 54
End: 2019-12-18
Payer: COMMERCIAL

## 2019-12-18 VITALS
WEIGHT: 235 LBS | DIASTOLIC BLOOD PRESSURE: 88 MMHG | BODY MASS INDEX: 40.12 KG/M2 | SYSTOLIC BLOOD PRESSURE: 126 MMHG | HEART RATE: 83 BPM | HEIGHT: 64 IN

## 2019-12-18 DIAGNOSIS — M79.18 MYOFASCIAL PAIN WITH REFERRED PAIN: ICD-10-CM

## 2019-12-18 DIAGNOSIS — M25.511 ACUTE PAIN OF RIGHT SHOULDER: ICD-10-CM

## 2019-12-18 DIAGNOSIS — M75.41 ROTATOR CUFF IMPINGEMENT SYNDROME OF RIGHT SHOULDER: Primary | ICD-10-CM

## 2019-12-18 PROCEDURE — 99213 OFFICE O/P EST LOW 20 MIN: CPT | Performed by: PHYSICAL MEDICINE & REHABILITATION

## 2019-12-18 NOTE — PROGRESS NOTES
1  Rotator cuff impingement syndrome of right shoulder     2  Acute pain of right shoulder     3  Myofascial pain with referred pain       No orders of the defined types were placed in this encounter  Imaging Studies (I personally reviewed images in PACS and report):  Right shoulder x-rays dated 10/23/2019  These images show a circular density in the humeral head that likely represents calcification  Minimal degenerative changes along the acromioclavicular joint  No other abnormalities noted  Impression:  Patient is here in follow up of her neck right shoulder pain secondary to myofascial pain syndrome/trigger points and rotator cuff impingement  Her cervical pain resolved after trigger point injections and muscle relaxants and now she complains of only right shoulder impingement symptoms  We decided to proceed with a subacromial steroid injection today  She should continue with PT and then transition to home exercise program   I will see her back in about 6 weeks  Return in about 6 weeks (around 1/29/2020)  HPI:  Clive Fenton is a 48 y o  female  who presents in follow up  Here for   Chief Complaint   Patient presents with    Follow-up       Date of injury:  Chronic  Trajectory of symptoms:  As above-all neck symptoms have resolved but she still has impingement symptoms on the right  Review of Systems   Constitutional: Positive for activity change  Negative for fever  HENT: Negative for sore throat  Eyes: Negative for visual disturbance  Respiratory: Negative for shortness of breath  Cardiovascular: Negative for chest pain  Gastrointestinal: Negative for abdominal pain  Endocrine: Negative for polydipsia  Genitourinary: Negative for difficulty urinating  Musculoskeletal: Positive for arthralgias and neck stiffness  Negative for neck pain  Skin: Negative for rash  Allergic/Immunologic: Negative for immunocompromised state  Neurological: Negative for numbness  Hematological: Does not bruise/bleed easily  Psychiatric/Behavioral: Negative for confusion  Following history reviewed and updated:  Past Medical History:   Diagnosis Date    Abnormal Pap smear of cervix     Depression     Hyperlipidemia      Past Surgical History:   Procedure Laterality Date    DILATION AND CURETTAGE OF UTERUS      [place] COLONOSCOPY FLX DX W/COLLJ SPEC WHEN PFRMD N/A 3/12/2018    Procedure: COLONOSCOPY;  Surgeon: Jae Nobles MD;  [facility]; Service: Gastroenterology     Social History   Social History     Substance and Sexual Activity   Alcohol Use Yes     Social History     Substance and Sexual Activity   Drug Use No     Social History     Tobacco Use   Smoking Status Former Smoker   Smokeless Tobacco Former User   Tobacco Comment    2 years     History reviewed  No pertinent family history  No Known Allergies     Constitutional:  /88 (BP Location: Right arm, Patient Position: Sitting, Cuff Size: Standard)   Pulse 83   Ht 5' 4" (1 626 m)   Wt 107 kg (235 lb)   BMI 40 34 kg/m²    General: NAD  Eyes: Clear sclerae  ENT: No inflammation, lesion, or mass of lips  No tracheal deviation  Musculoskeletal: As mentioned below  Integumentary: No visible rashes or skin lesions  Pulmonary/Chest: Effort normal  No respiratory distress  Neuro: CN's grossly intact, JOHNSON  Psych: Normal affect and judgement  Vascular: WWP  Right Shoulder Exam     Tenderness   The patient is experiencing tenderness in the acromion      Range of Motion   Active abduction: abnormal   Passive abduction: normal     Tests   Apprehension: negative  Wen test: positive  Impingement: positive    Other   Erythema: absent  Scars: absent  Sensation: normal  Pulse: present             Large joint arthrocentesis: R subacromial bursa  Date/Time: 12/19/2019 8:49 AM  Consent given by: patient  Supporting Documentation  Indications: pain   Procedure Details  Location: shoulder - R subacromial bursa  Ultrasound guidance: no  Approach: posterolateral  Medications administered: 5 mL lidocaine 1 %; 80 mg triamcinolone acetonide 40 mg/mL    Patient tolerance: patient tolerated the procedure well with no immediate complications  Dressing:  Sterile dressing applied    Discussed risk of infection, bleeding, fat atrophy/pigmentation, pain/steroid flare and increased blood sugars  Patient aware and expressed understanding        - none for this visit

## 2019-12-19 ENCOUNTER — OFFICE VISIT (OUTPATIENT)
Dept: PHYSICAL THERAPY | Facility: CLINIC | Age: 54
End: 2019-12-19
Payer: COMMERCIAL

## 2019-12-19 DIAGNOSIS — M75.41 IMPINGEMENT SYNDROME OF RIGHT SHOULDER: ICD-10-CM

## 2019-12-19 DIAGNOSIS — M79.18 MYOFASCIAL PAIN WITH REFERRED PAIN: Primary | ICD-10-CM

## 2019-12-19 DIAGNOSIS — M25.511 ACUTE PAIN OF RIGHT SHOULDER: ICD-10-CM

## 2019-12-19 PROCEDURE — 20610 DRAIN/INJ JOINT/BURSA W/O US: CPT | Performed by: PHYSICAL MEDICINE & REHABILITATION

## 2019-12-19 PROCEDURE — 97112 NEUROMUSCULAR REEDUCATION: CPT

## 2019-12-19 PROCEDURE — 97110 THERAPEUTIC EXERCISES: CPT

## 2019-12-19 PROCEDURE — 97140 MANUAL THERAPY 1/> REGIONS: CPT

## 2019-12-19 RX ORDER — TRIAMCINOLONE ACETONIDE 40 MG/ML
80 INJECTION, SUSPENSION INTRA-ARTICULAR; INTRAMUSCULAR
Status: COMPLETED | OUTPATIENT
Start: 2019-12-19 | End: 2019-12-19

## 2019-12-19 RX ORDER — LIDOCAINE HYDROCHLORIDE 10 MG/ML
5 INJECTION, SOLUTION INFILTRATION; PERINEURAL
Status: COMPLETED | OUTPATIENT
Start: 2019-12-19 | End: 2019-12-19

## 2019-12-19 RX ADMIN — TRIAMCINOLONE ACETONIDE 80 MG: 40 INJECTION, SUSPENSION INTRA-ARTICULAR; INTRAMUSCULAR at 08:49

## 2019-12-19 RX ADMIN — LIDOCAINE HYDROCHLORIDE 5 ML: 10 INJECTION, SOLUTION INFILTRATION; PERINEURAL at 08:49

## 2020-03-16 ENCOUNTER — OFFICE VISIT (OUTPATIENT)
Dept: URGENT CARE | Facility: CLINIC | Age: 55
End: 2020-03-16
Payer: COMMERCIAL

## 2020-03-16 ENCOUNTER — TELEPHONE (OUTPATIENT)
Dept: FAMILY MEDICINE CLINIC | Facility: CLINIC | Age: 55
End: 2020-03-16

## 2020-03-16 VITALS
DIASTOLIC BLOOD PRESSURE: 96 MMHG | WEIGHT: 235 LBS | BODY MASS INDEX: 40.12 KG/M2 | HEIGHT: 64 IN | OXYGEN SATURATION: 97 % | TEMPERATURE: 98.1 F | SYSTOLIC BLOOD PRESSURE: 126 MMHG | RESPIRATION RATE: 18 BRPM | HEART RATE: 90 BPM

## 2020-03-16 DIAGNOSIS — B34.9 VIRAL ILLNESS: Primary | ICD-10-CM

## 2020-03-16 PROCEDURE — 87635 SARS-COV-2 COVID-19 AMP PRB: CPT | Performed by: NURSE PRACTITIONER

## 2020-03-16 PROCEDURE — 87631 RESP VIRUS 3-5 TARGETS: CPT | Performed by: NURSE PRACTITIONER

## 2020-03-16 PROCEDURE — 99213 OFFICE O/P EST LOW 20 MIN: CPT | Performed by: NURSE PRACTITIONER

## 2020-03-16 NOTE — TELEPHONE ENCOUNTER
----- Message from Argelia Bar sent at 3/16/2020  5:50 PM EDT -----  Regarding: Referral Request  Contact: [phone]  Dear Dr Lupillo De La Paz been in direct contact with someone who has tested positive with COVID19  I have SOB (not severe), coughing (slightly productive), clammy and fatigued  No fever  Any suggestions on next steps?   Winifred Herring  Cell: [phone]

## 2020-03-16 NOTE — PATIENT INSTRUCTIONS
Self quarantine until you have received your tests results  Tylenol/Motrin as needed for pain   Increase fluid intake   Follow up with your PCP   Proceed to the ER for worsening symptoms   If you are going to the ER please call them ahead of time to inform them of your arrival       Viral Syndrome   WHAT YOU NEED TO KNOW:   Viral syndrome is a term used for a viral infection that has no clear cause  Viruses are spread easily from person to person through the air and on shared items  DISCHARGE INSTRUCTIONS:   Call 911 for the following:   · You have a seizure  · You cannot be woken  · You have chest pain or trouble breathing  Return to the emergency department if:   · You have a stiff neck, a bad headache, and sensitivity to light  · You feel weak, dizzy, or confused  · You stop urinating or urinate a lot less than normal      · You cough up blood or thick, yellow or green, mucus  · You have severe abdominal pain or your abdomen is larger than usual   Contact your healthcare provider if:   · Your symptoms do not get better with treatment, or get worse, after 3 days  · You have a rash or ear pain  · You have burning when you urinate  · You have questions or concerns about your condition or care  Medicines: You may  need any of the following:  · Acetaminophen  decreases pain and fever  It is available without a doctor's order  Ask how much medicine to take and how often to take it  Follow directions  Acetaminophen can cause liver damage if not taken correctly  · NSAIDs , such as ibuprofen, help decrease swelling, pain, and fever  NSAIDs can cause stomach bleeding or kidney problems in certain people  If you take blood thinner medicine, always ask your healthcare provider if NSAIDs are safe for you  Always read the medicine label and follow directions  · Cold medicine  helps decrease swelling, control a cough, and relieve chest or nasal congestion       · Saline nasal spray helps decrease nasal congestion  · Take your medicine as directed  Contact your healthcare provider if you think your medicine is not helping or if you have side effects  Tell him of her if you are allergic to any medicine  Keep a list of the medicines, vitamins, and herbs you take  Include the amounts, and when and why you take them  Bring the list or the pill bottles to follow-up visits  Carry your medicine list with you in case of an emergency  Manage your symptoms:   · Drink liquids as directed  to prevent dehydration  Ask how much liquid to drink each day and which liquids are best for you  Ask if you should drink an oral rehydration solution (ORS)  An ORS has the right amounts of water, salts, and sugar you need to replace body fluids  This may help prevent dehydration caused by vomiting or diarrhea  Do not drink liquids with caffeine  Drinks with caffeine can make dehydration worse  · Get plenty of rest  to help your body heal  Take naps throughout the day  Ask your healthcare provider when you can return to work and your normal activities  · Use a cool mist humidifier  to help you breathe easier if you have nasal or chest congestion  Ask your healthcare provider how to use a cool mist humidifier  · Eat honey or use cough drops  to help decrease throat discomfort  Ask your healthcare provider how much honey you should eat each day  Cough drops are available without a doctor's order  Follow directions for taking cough drops  · Do not smoke and stay away from others who smoke  Nicotine and other chemicals in cigarettes and cigars can cause lung damage  Smoking can also delay healing  Ask your healthcare provider for information if you currently smoke and need help to quit  E-cigarettes or smokeless tobacco still contain nicotine  Talk to your healthcare provider before you use these products  · Wash your hands frequently  to prevent the spread of germs to others  Use soap and water  Use gel hand  when soap and water are not available  Wash your hands after you use the bathroom, cough, or sneeze  Wash your hands before you prepare or eat food  Follow up with your healthcare provider as directed:  Write down your questions so you remember to ask them during your visits  © 2017 [employer] Information is for End User's use only and may not be sold, redistributed or otherwise used for commercial purposes  All illustrations and images included in CareNotes® are the copyrighted property of [employer] , [employer]  or [employer]  The above information is an  only  It is not intended as medical advice for individual conditions or treatments  Talk to your doctor, nurse or pharmacist before following any medical regimen to see if it is safe and effective for you

## 2020-03-16 NOTE — PROGRESS NOTES
[facility]        NAME: Jay Mullins is a 48 y o  female  : 1965    MRN: [id number]  DATE: 2020  TIME: 7:07 PM    Assessment and Plan   Viral illness [B34 9]  1  Viral illness  MISCELLANEOUS LAB TEST    Influenza A/B and RSV by PCR    MISCELLANEOUS LAB TEST     Covid 19 testing and influenza testing completed per protocol  Patient advised to quarantine at home until test results are received  She was given CDC handout and AVS at time of discharge  Work note provided  Patient Instructions   Self quarantine until you have received your tests results  Tylenol/Motrin as needed for pain   Increase fluid intake   Follow up with your PCP   Proceed to the ER for worsening symptoms   If you are going to the ER please call them ahead of time to inform them of your arrival       Follow up with PCP in 3-5 days  Proceed to  ER if symptoms worsen  Chief Complaint     Chief Complaint   Patient presents with    Cough     x today, had on thursday then went away    Shortness of Breath     x today, had on thursday then went away    Fatigue     x 1 week    Nasal Congestion         History of Present Illness       Patient is a 63-year-old female presenting for Covid 19 testing  She reports that her superior at work tested positive this past week  She last saw her 6 days ago  Four days ago she developed  Shortness of breath and dry cough  She reports the symptoms resolved the next day  She has been monitoring her temperature at home and reports that she never had a fever  She did feel clammy and had chills yesterday  This morning she developed a cough and shortness of breath  The cough is now moist with scant amount of mucus production  She does have rhinorrhea  Denies hemoptysis, abdominal pain, nausea, vomiting, or diarrhea  She has not taken any medications for symptomatic relief  Review of Systems   Review of Systems   Constitutional: Positive for chills   Negative for activity change and fever  HENT: Positive for rhinorrhea  Negative for congestion, ear discharge, ear pain, sinus pressure, sinus pain and sore throat  Respiratory: Positive for cough and shortness of breath  Gastrointestinal: Negative for abdominal pain, diarrhea, nausea and vomiting  Musculoskeletal: Negative for myalgias  Skin: Negative for rash  Neurological: Negative for headaches  Current Medications       Current Outpatient Medications:     albuterol (PROAIR HFA) 90 mcg/act inhaler, Inhale 1-2 puffs, Disp: , Rfl:     methocarbamol (ROBAXIN) 500 mg tablet, Take 1 tablet (500 mg total) by mouth 2 (two) times a day as needed for muscle spasms (Patient not taking: Reported on 12/18/2019), Disp: 30 tablet, Rfl: 0    Current Allergies     Allergies as of 03/16/2020    (No Known Allergies)            The following portions of the patient's history were reviewed and updated as appropriate: allergies, current medications, past family history, past medical history, past social history, past surgical history and problem list      Past Medical History:   Diagnosis Date    Abnormal Pap smear of cervix     Depression     Hyperlipidemia        Past Surgical History:   Procedure Laterality Date    DILATION AND CURETTAGE OF UTERUS      [place] COLONOSCOPY FLX DX W/COLLJ SPEC WHEN PFRMD N/A 3/12/2018    Procedure: COLONOSCOPY;  Surgeon: Som Fong MD;  [facility]; Service: Gastroenterology       No family history on file  Medications have been verified  Objective   /96   Pulse 90   Temp 98 1 °F (36 7 °C)   Resp 18   Ht 5' 4" (1 626 m)   Wt 107 kg (235 lb)   SpO2 97%   BMI 40 34 kg/m²        Physical Exam     Physical Exam   Constitutional: She is oriented to person, place, and time  Vital signs are normal  She appears well-developed and well-nourished  She is active  She does not appear ill  No distress  HENT:   Head: Normocephalic     Right Ear: Hearing, tympanic membrane, external ear and ear canal normal    Left Ear: Hearing, tympanic membrane, external ear and ear canal normal    Nose: Nose normal    Cardiovascular: Normal rate, regular rhythm, S1 normal, S2 normal and normal heart sounds  Pulmonary/Chest: Effort normal and breath sounds normal  No respiratory distress  She has no decreased breath sounds  She has no wheezes  She has no rhonchi  She has no rales  Neurological: She is alert and oriented to person, place, and time  She is not disoriented  Skin: Skin is warm, dry and intact

## 2020-03-16 NOTE — LETTER
March 16, 2020     Patient: Jay Mullins   YOB: 1965   Date of Visit: 3/16/2020       To Whom it May Concern:    Marita Willoughby was seen in my clinic on 3/16/2020  She may not return to work until tests are resulted  If you have any questions or concerns, please don't hesitate to call           Sincerely,          LESLIE Choudhury        CC: Winifred Hogan

## 2020-03-17 LAB
FLUAV RNA NPH QL NAA+PROBE: NORMAL
FLUBV RNA NPH QL NAA+PROBE: NORMAL
RSV RNA NPH QL NAA+PROBE: NORMAL

## 2020-03-19 LAB — SARS-COV-2 RNA SPEC QL NAA+PROBE: NOT DETECTED

## 2020-03-20 ENCOUNTER — TELEPHONE (OUTPATIENT)
Dept: URGENT CARE | Facility: CLINIC | Age: 55
End: 2020-03-20

## 2020-03-20 DIAGNOSIS — J20.9 ACUTE BRONCHITIS, UNSPECIFIED ORGANISM: Primary | ICD-10-CM

## 2020-03-20 RX ORDER — BENZONATATE 200 MG/1
200 CAPSULE ORAL 3 TIMES DAILY PRN
Qty: 20 CAPSULE | Refills: 0 | Status: SHIPPED | OUTPATIENT
Start: 2020-03-20 | End: 2021-05-19

## 2020-03-20 RX ORDER — ALBUTEROL SULFATE 90 UG/1
2 AEROSOL, METERED RESPIRATORY (INHALATION) EVERY 6 HOURS PRN
Qty: 18 G | Refills: 0 | Status: SHIPPED | OUTPATIENT
Start: 2020-03-20 | End: 2021-05-19

## 2020-03-20 NOTE — TELEPHONE ENCOUNTER
The patient was notified of her negative COVID-19 and influenza test results  She states that she is currently afebrile  She continues to have a cough with shortness of breath  I will call in a prescription for Tessalon Perles and an albuterol inhaler  She does not want to take steroids secondary to a reaction in the past   I advised her to follow-up with her primary care physician in 2-3 days or proceed to the emergency room if symptoms do not improve or worsen

## 2020-10-13 ENCOUNTER — TELEPHONE (OUTPATIENT)
Dept: BARIATRICS | Facility: CLINIC | Age: 55
End: 2020-10-13

## 2021-03-10 DIAGNOSIS — Z23 ENCOUNTER FOR IMMUNIZATION: ICD-10-CM

## 2021-03-15 ENCOUNTER — IMMUNIZATIONS (OUTPATIENT)
Dept: FAMILY MEDICINE CLINIC | Facility: HOSPITAL | Age: 56
End: 2021-03-15

## 2021-03-15 DIAGNOSIS — Z23 ENCOUNTER FOR IMMUNIZATION: Primary | ICD-10-CM

## 2021-03-15 PROCEDURE — 91300 SARS-COV-2 / COVID-19 MRNA VACCINE (PFIZER-BIONTECH) 30 MCG: CPT

## 2021-03-15 PROCEDURE — 0001A SARS-COV-2 / COVID-19 MRNA VACCINE (PFIZER-BIONTECH) 30 MCG: CPT

## 2021-04-05 ENCOUNTER — IMMUNIZATIONS (OUTPATIENT)
Dept: FAMILY MEDICINE CLINIC | Facility: HOSPITAL | Age: 56
End: 2021-04-05

## 2021-04-05 DIAGNOSIS — Z23 ENCOUNTER FOR IMMUNIZATION: Primary | ICD-10-CM

## 2021-04-05 PROCEDURE — 0002A SARS-COV-2 / COVID-19 MRNA VACCINE (PFIZER-BIONTECH) 30 MCG: CPT

## 2021-04-05 PROCEDURE — 91300 SARS-COV-2 / COVID-19 MRNA VACCINE (PFIZER-BIONTECH) 30 MCG: CPT

## 2021-04-08 ENCOUNTER — RA CDI HCC (OUTPATIENT)
Dept: OTHER | Facility: HOSPITAL | Age: 56
End: 2021-04-08

## 2021-04-08 NOTE — PROGRESS NOTES
[place] [id number]  coding opportunities             Chart reviewed, (number of) suggestions sent to provider: 1     Problem listed updated   Provider Accepted, (number of) suggestions accepted: 1        Patients insurance company: [address] [place] [address] (Medicare and Commercial for [employer] [facility])           [place] [address]  coding oppertunities             Chart reviewed, (number of) suggestions sent to provider: 1  E66 01  Morbid (severe) obesity due to excess calories

## 2021-04-13 PROBLEM — E66.01 MORBID (SEVERE) OBESITY DUE TO EXCESS CALORIES (HCC): Status: ACTIVE | Noted: 2021-04-13

## 2021-05-13 ENCOUNTER — RA CDI HCC (OUTPATIENT)
Dept: OTHER | Facility: HOSPITAL | Age: 56
End: 2021-05-13

## 2021-05-13 NOTE — PROGRESS NOTES
[place] [address]  coding opportunities             Chart reviewed, (number of) suggestions sent to provider: 1     Problem listed updated  Provider Accepted, (number of) suggestions accepted: 1     Provider Rejected Suggestions for: suggestion used     Patients insurance company: Irineo Brink (Medicare and Commercial for [facility])     Visit status: Patient arrived for their scheduled appointment        [place] [address]  coding opportunities             Chart reviewed, (number of) suggestions sent to provider: 1     Problem listed updated   Provider Accepted, (number of) suggestions accepted: 1        Patients insurance company: Irineo Brink (Medicare and [employer] for [employer] and [facility]           [place] [address]  coding opportunities             Chart reviewed, (number of) suggestions sent to provider: 1  E66 01  Morbid (severe) obesity due to excess calories-BMI of 40 and higher or BMI of 35-39 9 with comorbid condition         Patients insurance company: Irineo Brink (Medicare and [employer] for [employer] and [facility])

## 2021-05-17 NOTE — PROGRESS NOTES
FAMILY PRACTICE HEALTH MAINTENANCE OFFICE VISIT  [facility]    NAME: Davida Boyd  AGE: 50 y o  SEX: female  : 1965     DATE: 2021    Assessment and Plan     1  Well adult exam    2  Morbid (severe) obesity due to excess calories ([address] )  -     Ambulatory referral to Weight Management; Future    3  Adenomatous polyp  -     Ambulatory referral to Gastroenterology; Future    4  Screening breast examination  -     Mammo screening bilateral w 3d & cad; Future; Expected date: 2021    5  BMI 39 0-39 9,adult    6  Screening for cardiovascular condition  -     CBC and differential; Future  -     Comprehensive metabolic panel; Future  -     Lipid panel; Future  -     CBC and differential  -     Comprehensive metabolic panel  -     Lipid panel        · Patient Counseling:   · Nutrition: Stressed importance of a well balanced diet, moderation of sodium/saturated fat, caloric balance and sufficient intake of fiber  · Exercise: Stressed the importance of regular exercise with a goal of 150 minutes per week  · Dental Health: Discussed daily flossing and brushing and regular dental visits     · Immunizations reviewed: Up To Date  · Discussed benefits of:  Colon Cancer Screening, Mammogram , Cervical Cancer screening and Screening labs   BMI Counseling: Body mass index is 39 65 kg/m²  Discussed with patient's BMI with her  The BMI is above normal  Nutrition recommendations include reducing portion sizes and decreasing overall calorie intake  Exercise recommendations include moderate aerobic physical activity for 150 minutes/week  No follow-ups on file  Chief Complaint     Chief Complaint   Patient presents with    Physical Exam     [id number]       History of Present Illness     Here for CPE  She is upset about her weight and feels she needs to do something about this but feels deprived if she tries to cut back  She would like to see weight management        Well Adult Physical   Patient here for a comprehensive physical exam       Diet and Physical Activity  Diet: well balanced diet  Exercise: frequently      Depression Screen  PHQ-9 Depression Screening    PHQ-9:   Frequency of the following problems over the past two weeks:      Little interest or pleasure in doing things: 1 - several days  Feeling down, depressed, or hopeless: 1 - several days  PHQ-2 Score: 2          General Health  Hearing: Normal:  bilateral  Vision: no vision problems  Dental: regular dental visits    Reproductive Health  No issues  and Follows with gynecologist      The following portions of the patient's history were reviewed and updated as appropriate: allergies, current medications, past family history, past medical history, past social history, past surgical history and problem list     Review of Systems     Review of Systems   Constitutional: Negative  HENT: Negative  Eyes: Negative  Respiratory: Negative  Cardiovascular: Negative  Gastrointestinal: Negative  Endocrine: Negative  Genitourinary: Negative  Musculoskeletal: Negative  Skin: Negative  Allergic/Immunologic: Negative  Neurological: Negative  Hematological: Negative  Psychiatric/Behavioral: Negative  Past Medical History     Past Medical History:   Diagnosis Date    Abnormal Pap smear of cervix     Depression     Hyperlipidemia        Past Surgical History     Past Surgical History:   Procedure Laterality Date    DILATION AND CURETTAGE OF UTERUS      [place] COLONOSCOPY FLX DX W/COLLJ SPEC WHEN PFRMD N/A 3/12/2018    Procedure: COLONOSCOPY;  Surgeon: Denise Mccall MD;  [facility];   Service: Gastroenterology       Social History     Social History     Socioeconomic History    Marital status: /Civil Union     Spouse name: None    Number of children: None    Years of education: None    Highest education level: None   Occupational History    None   Social Needs    Financial resource strain: None    Food insecurity     Worry: None     Inability: None    Transportation needs     Medical: None     Non-medical: None   Tobacco Use    Smoking status: Former Smoker     Quit date: 2012     Years since quittin 0    Smokeless tobacco: Never Used    Tobacco comment: 2 years   Substance and Sexual Activity    Alcohol use: Yes     Frequency: 2-3 times a week     Drinks per session: 5 or 6    Drug use: No    Sexual activity: None   Lifestyle    Physical activity     Days per week: None     Minutes per session: None    Stress: None   Relationships    Social connections     Talks on phone: None     Gets together: None     Attends Jainism service: None     Active member of club or organization: None     Attends meetings of clubs or organizations: None     Relationship status: None    Intimate partner violence     Fear of current or ex partner: None     Emotionally abused: None     Physically abused: None     Forced sexual activity: None   Other Topics Concern    None   Social History Narrative    None       Family History     No family history on file  Current Medications     No current outpatient medications on file  Allergies     No Known Allergies    Objective     /76   Pulse 73   Temp 97 7 °F (36 5 °C)   Resp 18   Ht 5' 4" (1 626 m)   Wt 105 kg (231 lb)   SpO2 98%   BMI 39 65 kg/m²      Physical Exam  Constitutional:       General: She is not in acute distress  Appearance: She is well-developed  She is not diaphoretic  HENT:      Head: Normocephalic and atraumatic  Right Ear: External ear normal       Left Ear: External ear normal    Neck:      Musculoskeletal: Normal range of motion and neck supple  Thyroid: No thyromegaly  Cardiovascular:      Rate and Rhythm: Normal rate and regular rhythm  Heart sounds: Normal heart sounds  No murmur  No friction rub  No gallop      Pulmonary:      Effort: Pulmonary effort is normal  Breath sounds: Normal breath sounds  No wheezing or rales  Abdominal:      General: Bowel sounds are normal       Palpations: Abdomen is soft  There is no mass  Tenderness: There is no abdominal tenderness  There is no rebound  Musculoskeletal: Normal range of motion  General: No deformity  Lymphadenopathy:      Cervical: No cervical adenopathy  Skin:     General: Skin is warm and dry  Findings: No rash  Neurological:      Mental Status: She is alert and oriented to person, place, and time  Cranial Nerves: No cranial nerve deficit  Motor: No abnormal muscle tone  Coordination: Coordination normal       Deep Tendon Reflexes: Reflexes are normal and symmetric  Reflexes normal    Psychiatric:         Behavior: Behavior normal          Thought Content:  Thought content normal          Judgment: Judgment normal             Visual Acuity Screening    Right eye Left eye Both eyes   Without correction:      With correction: 20/20 20/25 20/20           Shavon Griggs, [address]

## 2021-05-19 ENCOUNTER — OFFICE VISIT (OUTPATIENT)
Dept: FAMILY MEDICINE CLINIC | Facility: CLINIC | Age: 56
End: 2021-05-19
Payer: COMMERCIAL

## 2021-05-19 VITALS
DIASTOLIC BLOOD PRESSURE: 76 MMHG | HEART RATE: 73 BPM | OXYGEN SATURATION: 98 % | TEMPERATURE: 97.7 F | SYSTOLIC BLOOD PRESSURE: 132 MMHG | BODY MASS INDEX: 39.44 KG/M2 | RESPIRATION RATE: 18 BRPM | WEIGHT: 231 LBS | HEIGHT: 64 IN

## 2021-05-19 DIAGNOSIS — D36.9 ADENOMATOUS POLYP: ICD-10-CM

## 2021-05-19 DIAGNOSIS — Z13.6 SCREENING FOR CARDIOVASCULAR CONDITION: ICD-10-CM

## 2021-05-19 DIAGNOSIS — Z00.00 WELL ADULT EXAM: Primary | ICD-10-CM

## 2021-05-19 DIAGNOSIS — E66.01 MORBID (SEVERE) OBESITY DUE TO EXCESS CALORIES (HCC): ICD-10-CM

## 2021-05-19 DIAGNOSIS — Z12.39 SCREENING BREAST EXAMINATION: ICD-10-CM

## 2021-05-19 PROBLEM — Z12.11 SCREENING FOR COLON CANCER: Status: RESOLVED | Noted: 2018-02-02 | Resolved: 2021-05-19

## 2021-05-19 PROBLEM — M25.511 ACUTE PAIN OF RIGHT SHOULDER: Status: RESOLVED | Noted: 2019-10-23 | Resolved: 2021-05-19

## 2021-05-19 PROCEDURE — 3725F SCREEN DEPRESSION PERFORMED: CPT | Performed by: INTERNAL MEDICINE

## 2021-05-19 PROCEDURE — 99396 PREV VISIT EST AGE 40-64: CPT | Performed by: INTERNAL MEDICINE

## 2021-05-19 PROCEDURE — 1036F TOBACCO NON-USER: CPT | Performed by: INTERNAL MEDICINE

## 2021-05-19 PROCEDURE — 3008F BODY MASS INDEX DOCD: CPT | Performed by: INTERNAL MEDICINE

## 2021-05-25 LAB
ALBUMIN SERPL-MCNC: 4.3 G/DL (ref 3.8–4.9)
ALBUMIN/GLOB SERPL: 1.7 {RATIO} (ref 1.2–2.2)
ALP SERPL-CCNC: 69 IU/L (ref 48–121)
ALT SERPL-CCNC: 25 IU/L (ref 0–32)
AST SERPL-CCNC: 21 IU/L (ref 0–40)
BASOPHILS # BLD AUTO: 0.1 X10E3/UL (ref 0–0.2)
BASOPHILS NFR BLD AUTO: 1 %
BILIRUB SERPL-MCNC: <0.2 MG/DL (ref 0–1.2)
BUN SERPL-MCNC: 11 MG/DL (ref 6–24)
BUN/CREAT SERPL: 16 (ref 9–23)
CALCIUM SERPL-MCNC: 9.5 MG/DL (ref 8.7–10.2)
CHLORIDE SERPL-SCNC: 107 MMOL/L (ref 96–106)
CHOLEST SERPL-MCNC: 253 MG/DL (ref 100–199)
CHOLEST/HDLC SERPL: 5.8 RATIO (ref 0–4.4)
CO2 SERPL-SCNC: 24 MMOL/L (ref 20–29)
CREAT SERPL-MCNC: 0.7 MG/DL (ref 0.57–1)
EOSINOPHIL # BLD AUTO: 0.2 X10E3/UL (ref 0–0.4)
EOSINOPHIL NFR BLD AUTO: 4 %
ERYTHROCYTE [DISTWIDTH] IN BLOOD BY AUTOMATED COUNT: 12.5 % (ref 11.7–15.4)
GLOBULIN SER-MCNC: 2.5 G/DL (ref 1.5–4.5)
GLUCOSE SERPL-MCNC: 94 MG/DL (ref 65–99)
HCT VFR BLD AUTO: 41.7 % (ref 34–46.6)
HDLC SERPL-MCNC: 44 MG/DL
HGB BLD-MCNC: 14 G/DL (ref 11.1–15.9)
IMM GRANULOCYTES # BLD: 0 X10E3/UL (ref 0–0.1)
IMM GRANULOCYTES NFR BLD: 0 %
LDLC SERPL CALC-MCNC: 183 MG/DL (ref 0–99)
LYMPHOCYTES # BLD AUTO: 2.3 X10E3/UL (ref 0.7–3.1)
LYMPHOCYTES NFR BLD AUTO: 37 %
MCH RBC QN AUTO: 30.2 PG (ref 26.6–33)
MCHC RBC AUTO-ENTMCNC: 33.6 G/DL (ref 31.5–35.7)
MCV RBC AUTO: 90 FL (ref 79–97)
MICRODELETION SYND BLD/T FISH: NORMAL
MONOCYTES # BLD AUTO: 0.4 X10E3/UL (ref 0.1–0.9)
MONOCYTES NFR BLD AUTO: 7 %
NEUTROPHILS # BLD AUTO: 3.1 X10E3/UL (ref 1.4–7)
NEUTROPHILS NFR BLD AUTO: 51 %
PLATELET # BLD AUTO: 463 X10E3/UL (ref 150–450)
POTASSIUM SERPL-SCNC: 4.6 MMOL/L (ref 3.5–5.2)
PROT SERPL-MCNC: 6.8 G/DL (ref 6–8.5)
RBC # BLD AUTO: 4.64 X10E6/UL (ref 3.77–5.28)
SL AMB EGFR AFRICAN AMERICAN: 114 ML/MIN/1.73
SL AMB EGFR NON AFRICAN AMERICAN: 99 ML/MIN/1.73
SL AMB VLDL CHOLESTEROL CALC: 26 MG/DL (ref 5–40)
SODIUM SERPL-SCNC: 143 MMOL/L (ref 134–144)
TRIGL SERPL-MCNC: 140 MG/DL (ref 0–149)
WBC # BLD AUTO: 6.1 X10E3/UL (ref 3.4–10.8)

## 2021-05-28 ENCOUNTER — HOSPITAL ENCOUNTER (OUTPATIENT)
Dept: RADIOLOGY | Facility: HOSPITAL | Age: 56
Discharge: HOME | End: 2021-05-28
Attending: INTERNAL MEDICINE
Payer: COMMERCIAL

## 2021-05-28 DIAGNOSIS — Z12.39 SCREENING BREAST EXAMINATION: ICD-10-CM

## 2021-05-28 PROCEDURE — 77067 SCR MAMMO BI INCL CAD: CPT

## 2021-05-28 PROCEDURE — 77063 BREAST TOMOSYNTHESIS BI: CPT

## 2021-08-27 ENCOUNTER — CONSULT (OUTPATIENT)
Dept: GASTROENTEROLOGY | Facility: AMBULARY SURGERY CENTER | Age: 56
End: 2021-08-27
Payer: COMMERCIAL

## 2021-08-27 VITALS
DIASTOLIC BLOOD PRESSURE: 82 MMHG | SYSTOLIC BLOOD PRESSURE: 142 MMHG | HEIGHT: 64 IN | BODY MASS INDEX: 40.02 KG/M2 | WEIGHT: 234.4 LBS

## 2021-08-27 DIAGNOSIS — D36.9 ADENOMATOUS POLYP: ICD-10-CM

## 2021-08-27 PROCEDURE — 99204 OFFICE O/P NEW MOD 45 MIN: CPT | Performed by: INTERNAL MEDICINE

## 2021-08-27 PROCEDURE — 3008F BODY MASS INDEX DOCD: CPT | Performed by: INTERNAL MEDICINE

## 2021-08-27 PROCEDURE — 1036F TOBACCO NON-USER: CPT | Performed by: INTERNAL MEDICINE

## 2021-08-27 NOTE — PROGRESS NOTES
Consultation -  [facility]  Kamryn Valenzuela 49 y o  female MRN: [id number]  Unit/Bed#:  Encounter: [id number]        Consults    ASSESSMENT/PLAN:     1  Colon cancer screening- history of adenomatous polyps- noted to have 11 polyps on colonoscopy in 2018  She was recommended a repeat colonoscopy in 1 year  Interval however this has not been done  -  No alarm symptoms     -Will schedule screening colonoscopy at this time   -  Suprep instructions given  Patient was explained about  the risks and benefits of the procedure  Risks including but not limited to bleeding, infection, perforation were explained in detail  Also explained about less than 100% sensitivity with the exam and other alternatives  ______________________________________________________________________    Reason for Consult / Principal Problem: [unfilled]    HPI: Kamryn Valenzuela is a 49y o  year old female with history of adenomatous polyp, presents for colon cancer screening evaluation  Patient underwent colonoscopy by me in 2018  She was noted to have 11 colon polyps, 1 was adenomatous, she was recommended a repeat at 1 year interval   This has not been done yet  Patient denies any change in bowel habits, hematochezia, abdominal pain or unintentional weight loss  No hip upper GI symptoms including acid reflux, dysphagia, odynophagia, loss of appetite or early satiety  Patient is not on any medications  No family history of colon CA  Patient is not on any antiplatelets or anticoagulants  Review of Systems: The remainder of the review of systems was negative except for the pertinent positives noted in HPI  Historical Information   Past Medical History:   Diagnosis Date    Abnormal Pap smear of cervix     Colon polyp     Depression     Hyperlipidemia     Obesity (BMI 30-39  9)      Past Surgical History:   Procedure Laterality Date    COLONOSCOPY      DILATION AND CURETTAGE OF UTERUS      NE COLONOSCOPY FLX DX W/COLLJ SPEC WHEN PFRMD N/A 3/12/2018    Procedure: COLONOSCOPY;  Surgeon: Keily Gandara MD;  [facility]; Service: Gastroenterology     Social History   Social History     Substance and Sexual Activity   Alcohol Use Yes    Comment: Occs      Social History     Substance and Sexual Activity   Drug Use No     Social History     Tobacco Use   Smoking Status Former Smoker    Quit date: 2012    Years since quittin 2   Smokeless Tobacco Never Used   Tobacco Comment    6 years     Family History   Problem Relation Age of Onset    Breast cancer Paternal [de-identified] Breast cancer Paternal Aunt     Ovarian cancer Paternal Aunt        Meds/Allergies     (Not in a hospital admission)    No current facility-administered medications for this visit  Allergies   Allergen Reactions    Shrimp Flavor - Food Allergy Itching       Objective     Blood pressure 142/82, height 5' 4" (1 626 m), weight 106 kg (234 lb 6 4 oz)  [unfilled]    PHYSICAL EXAM     GEN: well nourished, well developed, no acute distress  HEENT: anicteric, MMM, no cervical or supraclavicular lymphadenopathy  CV: RRR, no m/r/g  CHEST: CTA b/l, no WRR  ABD: +BS, soft, NT/ND, no hepatosplenomegaly  EXT: no c/c/e  SKIN: no rashes,  NEURO: aaox3    Lab Results:   No visits with results within 1 Day(s) from this visit     Latest known visit with results is:   Office Visit on 2021   Component Date Value    White Blood Cell Count 2021 6 1     Red Blood Cell Count 2021 4 64     Hemoglobin 2021 14 0     HCT 2021 41 7     MCV 2021 90     MCH 2021 30 2     MCHC 2021 33 6     RDW 2021 12 5     Platelet Count  463*    Neutrophils 2021 51     Lymphocytes 2021 37     Monocytes 2021 7     Eosinophils 2021 4     Basophils PCT 2021 1     Neutrophils (Absolute) 2021 3 1     Lymphocytes (Absolute) 05/24/2021 2 3     Monocytes (Absolute) 05/24/2021 0 4     Eosinophils (Absolute) 05/24/2021 0 2     Basophils ABS 05/24/2021 0 1     Immature Granulocytes 05/24/2021 0     Immature Granulocytes (A* 05/24/2021 0 0     Glucose, Random 05/24/2021 94     BUN 05/24/2021 11     Creatinine 05/24/2021 0 70     eGFR Non  05/24/2021 99     eGFR  05/24/2021 114     SL AMB BUN/CREATININE RA* 05/24/2021 16     Sodium 05/24/2021 143     Potassium 05/24/2021 4 6     Chloride 05/24/2021 107*    CO2 05/24/2021 24     CALCIUM 05/24/2021 9 5     Protein, Total 05/24/2021 6 8     Albumin 05/24/2021 4 3     Globulin, Total 05/24/2021 2 5     Albumin/Globulin Ratio 05/24/2021 1 7     TOTAL BILIRUBIN 05/24/2021 <0 2     Alk Phos Isoenzymes 05/24/2021 69     AST 05/24/2021 21     ALT 05/24/2021 25     Cholesterol, Total 05/24/2021 253*    Triglycerides 05/24/2021 140     HDL 05/24/2021 44     VLDL Cholesterol Calcula* 05/24/2021 26     LDL Calculated 05/24/2021 183*    T  Chol/HDL Ratio 05/24/2021 5 8*    Interpretation 05/24/2021 Note      Imaging Studies: I have personally reviewed pertinent films in PACS                Answers for HPI/ROS submitted by the patient on 8/21/2021  anorexia: No  arthralgias: Yes  belching: No  constipation: No  diarrhea: No  dysuria: No  fever: No  flatus: No  frequency: No  headaches: No  hematochezia: No  hematuria: No  melena: No  myalgias: Yes  nausea:  No  weight loss: No  vomiting: No

## 2021-08-27 NOTE — LETTER
August 27, 2021     Jagdeep Brown MD  [address]  [address] [place] [id number]    Patient: Latoya De La Fuente   YOB: 1965   Date of Visit: 8/27/2021       Dear Dr Marycarmen Humphreys:    Thank you for referring Soila Horn to me for evaluation  Below are my notes for this consultation  If you have questions, please do not hesitate to call me  I look forward to following your patient along with you  Sincerely,        Sumit Salas MD        CC: No Recipients  Sumit Saals MD  8/27/2021 11:36 AM  Sign when Signing Visit  Consultation - [facility]  Latoya De La Fuente 49 y o  female MRN: [id number]  Unit/Bed#:  Encounter: [id number]        Consults    ASSESSMENT/PLAN:     1  Colon cancer screening- history of adenomatous polyps- noted to have 11 polyps on colonoscopy in 2018  She was recommended a repeat colonoscopy in 1 year  Interval however this has not been done  -  No alarm symptoms     -Will schedule screening colonoscopy at this time   -  Suprep instructions given  Patient was explained about  the risks and benefits of the procedure  Risks including but not limited to bleeding, infection, perforation were explained in detail  Also explained about less than 100% sensitivity with the exam and other alternatives  ______________________________________________________________________    Reason for Consult / Principal Problem: [unfilled]    HPI: Latoya De La Fuente is a 49y o  year old female with history of adenomatous polyp, presents for colon cancer screening evaluation  Patient underwent colonoscopy by me in 2018  She was noted to have 11 colon polyps, 1 was adenomatous, she was recommended a repeat at 1 year interval   This has not been done yet  Patient denies any change in bowel habits, hematochezia, abdominal pain or unintentional weight loss    No hip upper GI symptoms including acid reflux, dysphagia, odynophagia, loss of appetite or early satiety  Patient is not on any medications  No family history of colon CA  Patient is not on any antiplatelets or anticoagulants  Review of Systems: The remainder of the review of systems was negative except for the pertinent positives noted in HPI  Historical Information   Past Medical History:   Diagnosis Date    Abnormal Pap smear of cervix     Colon polyp     Depression     Hyperlipidemia     Obesity (BMI 30-39  9)      Past Surgical History:   Procedure Laterality Date    COLONOSCOPY      DILATION AND CURETTAGE OF UTERUS      VT COLONOSCOPY FLX DX W/COLLJ SPEC WHEN PFRMD N/A 3/12/2018    Procedure: COLONOSCOPY;  Surgeon: Park Kelly MD;  [facility]; Service: Gastroenterology     Social History   Social History     Substance and Sexual Activity   Alcohol Use Yes    Comment: Occs      Social History     Substance and Sexual Activity   Drug Use No     Social History     Tobacco Use   Smoking Status Former Smoker    Quit date: 2012    Years since quittin 2   Smokeless Tobacco Never Used   Tobacco Comment    6 years     Family History   Problem Relation Age of Onset    Breast cancer Paternal [de-identified] Breast cancer Paternal Aunt     Ovarian cancer Paternal Aunt        Meds/Allergies     (Not in a hospital admission)    No current facility-administered medications for this visit  Allergies   Allergen Reactions    Shrimp Flavor - Food Allergy Itching       Objective     Blood pressure 142/82, height 5' 4" (1 626 m), weight 106 kg (234 lb 6 4 oz)  [unfilled]    PHYSICAL EXAM     GEN: well nourished, well developed, no acute distress  HEENT: anicteric, MMM, no cervical or supraclavicular lymphadenopathy  CV: RRR, no m/r/g  CHEST: CTA b/l, no WRR  ABD: +BS, soft, NT/ND, no hepatosplenomegaly  EXT: no c/c/e  SKIN: no rashes,  NEURO: aaox3    Lab Results:   No visits with results within 1 Day(s) from this visit     Latest known visit with results is:   Office Visit on 05/19/2021   Component Date Value    White Blood Cell Count 05/24/2021 6 1     Red Blood Cell Count 05/24/2021 4 64     Hemoglobin 05/24/2021 14 0     HCT 05/24/2021 41 7     MCV 05/24/2021 90     MCH 05/24/2021 30 2     MCHC 05/24/2021 33 6     RDW 05/24/2021 12 5     Platelet Count 50/27/9270 463*    Neutrophils 05/24/2021 51     Lymphocytes 05/24/2021 37     Monocytes 05/24/2021 7     Eosinophils 05/24/2021 4     Basophils PCT 05/24/2021 1     Neutrophils (Absolute) 05/24/2021 3 1     Lymphocytes (Absolute) 05/24/2021 2 3     Monocytes (Absolute) 05/24/2021 0 4     Eosinophils (Absolute) 05/24/2021 0 2     Basophils ABS 05/24/2021 0 1     Immature Granulocytes 05/24/2021 0     Immature Granulocytes (A* 05/24/2021 0 0     Glucose, Random 05/24/2021 94     BUN 05/24/2021 11     Creatinine 05/24/2021 0 70     eGFR Non  05/24/2021 99     eGFR  05/24/2021 114     SL AMB BUN/CREATININE RA* 05/24/2021 16     Sodium 05/24/2021 143     Potassium 05/24/2021 4 6     Chloride 05/24/2021 107*    CO2 05/24/2021 24     CALCIUM 05/24/2021 9 5     Protein, Total 05/24/2021 6 8     Albumin 05/24/2021 4 3     Globulin, Total 05/24/2021 2 5     Albumin/Globulin Ratio 05/24/2021 1 7     TOTAL BILIRUBIN 05/24/2021 <0 2     Alk Phos Isoenzymes 05/24/2021 69     AST 05/24/2021 21     ALT 05/24/2021 25     Cholesterol, Total 05/24/2021 253*    Triglycerides 05/24/2021 140     HDL 05/24/2021 44     VLDL Cholesterol Calcula* 05/24/2021 26     LDL Calculated 05/24/2021 183*    T   Chol/HDL Ratio 05/24/2021 5 8*    Interpretation 05/24/2021 Note      Imaging Studies: I have personally reviewed pertinent films in PACS                Answers for HPI/ROS submitted by the patient on 8/21/2021  anorexia: No  arthralgias: Yes  belching: No  constipation: No  diarrhea: No  dysuria: No  fever: No  flatus: No  frequency: No  headaches: No  hematochezia: No  hematuria: No  melena: No  myalgias: Yes  nausea:  No  weight loss: No  vomiting: No

## 2021-10-25 ENCOUNTER — ANESTHESIA (OUTPATIENT)
Dept: ANESTHESIOLOGY | Facility: HOSPITAL | Age: 56
End: 2021-10-25

## 2021-10-25 ENCOUNTER — ANESTHESIA EVENT (OUTPATIENT)
Dept: ANESTHESIOLOGY | Facility: HOSPITAL | Age: 56
End: 2021-10-25

## 2021-11-08 ENCOUNTER — TELEPHONE (OUTPATIENT)
Dept: GASTROENTEROLOGY | Facility: AMBULARY SURGERY CENTER | Age: 56
End: 2021-11-08

## 2021-11-09 ENCOUNTER — ANESTHESIA EVENT (OUTPATIENT)
Dept: GASTROENTEROLOGY | Facility: AMBULARY SURGERY CENTER | Age: 56
End: 2021-11-09

## 2021-11-09 ENCOUNTER — HOSPITAL ENCOUNTER (OUTPATIENT)
Dept: GASTROENTEROLOGY | Facility: AMBULARY SURGERY CENTER | Age: 56
Setting detail: OUTPATIENT SURGERY
Discharge: HOME | End: 2021-11-09
Attending: INTERNAL MEDICINE
Payer: COMMERCIAL

## 2021-11-09 ENCOUNTER — ANESTHESIA (OUTPATIENT)
Dept: GASTROENTEROLOGY | Facility: AMBULARY SURGERY CENTER | Age: 56
End: 2021-11-09

## 2021-11-09 VITALS
BODY MASS INDEX: 39.61 KG/M2 | DIASTOLIC BLOOD PRESSURE: 73 MMHG | TEMPERATURE: 96.8 F | HEIGHT: 64 IN | WEIGHT: 232 LBS | SYSTOLIC BLOOD PRESSURE: 124 MMHG | RESPIRATION RATE: 13 BRPM | HEART RATE: 65 BPM | OXYGEN SATURATION: 94 %

## 2021-11-09 DIAGNOSIS — D36.9 ADENOMATOUS POLYP: ICD-10-CM

## 2021-11-09 PROCEDURE — 45380 COLONOSCOPY AND BIOPSY: CPT | Performed by: INTERNAL MEDICINE

## 2021-11-09 PROCEDURE — 88305 TISSUE EXAM BY PATHOLOGIST: CPT | Performed by: PATHOLOGY

## 2021-11-09 PROCEDURE — 45385 COLONOSCOPY W/LESION REMOVAL: CPT | Performed by: INTERNAL MEDICINE

## 2021-11-09 RX ORDER — LIDOCAINE HYDROCHLORIDE 10 MG/ML
INJECTION, SOLUTION EPIDURAL; INFILTRATION; INTRACAUDAL; PERINEURAL AS NEEDED
Status: DISCONTINUED | OUTPATIENT
Start: 2021-11-09 | End: 2021-11-09

## 2021-11-09 RX ORDER — PROPOFOL 10 MG/ML
INJECTION, EMULSION INTRAVENOUS AS NEEDED
Status: DISCONTINUED | OUTPATIENT
Start: 2021-11-09 | End: 2021-11-09

## 2021-11-09 RX ORDER — SODIUM CHLORIDE, SODIUM LACTATE, POTASSIUM CHLORIDE, CALCIUM CHLORIDE 600; 310; 30; 20 MG/100ML; MG/100ML; MG/100ML; MG/100ML
INJECTION, SOLUTION INTRAVENOUS CONTINUOUS PRN
Status: DISCONTINUED | OUTPATIENT
Start: 2021-11-09 | End: 2021-11-09

## 2021-11-09 RX ORDER — PROPOFOL 10 MG/ML
INJECTION, EMULSION INTRAVENOUS CONTINUOUS PRN
Status: DISCONTINUED | OUTPATIENT
Start: 2021-11-09 | End: 2021-11-09

## 2021-11-09 RX ADMIN — PROPOFOL 80 MG: 10 INJECTION, EMULSION INTRAVENOUS at 15:18

## 2021-11-09 RX ADMIN — SODIUM CHLORIDE, SODIUM LACTATE, POTASSIUM CHLORIDE, AND CALCIUM CHLORIDE: .6; .31; .03; .02 INJECTION, SOLUTION INTRAVENOUS at 15:03

## 2021-11-09 RX ADMIN — PROPOFOL 150 MCG/KG/MIN: 10 INJECTION, EMULSION INTRAVENOUS at 15:18

## 2021-11-09 RX ADMIN — LIDOCAINE HYDROCHLORIDE 50 MG: 10 INJECTION, SOLUTION EPIDURAL; INFILTRATION; INTRACAUDAL at 15:18

## 2022-01-17 ENCOUNTER — TELEPHONE (OUTPATIENT)
Dept: OBGYN CLINIC | Facility: CLINIC | Age: 57
End: 2022-01-17

## 2022-01-27 ENCOUNTER — RA CDI HCC (OUTPATIENT)
Dept: OTHER | Facility: HOSPITAL | Age: 57
End: 2022-01-27

## 2022-01-27 NOTE — PROGRESS NOTES
Assessment/Plan:    1  Branch retinal vein occlusion, unspecified complication status, unspecified laterality  Assessment & Plan:  Labwork ordered to evaluate cholesterol and A1C levels  Orders:  -     CBC and differential; Future  -     Comprehensive metabolic panel; Future  -     Lipid panel; Future  -     HEMOGLOBIN A1C W/ EAG ESTIMATION; Future  -     CBC and differential  -     Comprehensive metabolic panel  -     Lipid panel  -     HEMOGLOBIN A1C W/ EAG ESTIMATION    2  Primary hypertension  Assessment & Plan:  BP elevated today, will start low dose lisinopril to decrease bp readings as well as risk for further issues  F/u in 3 weeks for bp recheck and will have labs done in the meantime  Orders:  -     CBC and differential; Future  -     Comprehensive metabolic panel; Future  -     Lipid panel; Future  -     lisinopril (ZESTRIL) 5 mg tablet; Take 1 tablet (5 mg total) by mouth daily  -     CBC and differential  -     Comprehensive metabolic panel  -     Lipid panel          There are no Patient Instructions on file for this visit  Return in about 3 weeks (around 2/24/2022)  Subjective:      Patient ID: Mary Falcon is a 49 y o  female  Chief Complaint   Patient presents with    Eye Trauma     branch retinal brain occlusion, per eye doctor  ac/lpn    Blood Pressure Check       She went to get new glasses and her eye doctor saw hemorrhages in her retina  She saw a retinal specialist the next day, who gave her shots in her eye and told her it could be her bp  Denies family or personal history of clotting disorder or DM  The following portions of the patient's history were reviewed and updated as appropriate: allergies, current medications, past family history, past medical history, past social history, past surgical history and problem list     Review of Systems   Constitutional: Negative  Respiratory: Negative  Cardiovascular: Negative  Endocrine: Negative  Hematological: Negative  Current Outpatient Medications   Medication Sig Dispense Refill    lisinopril (ZESTRIL) 5 mg tablet Take 1 tablet (5 mg total) by mouth daily 30 tablet 5     No current facility-administered medications for this visit  Objective:    /80   Pulse 64   Temp (!) 97 3 °F (36 3 °C)   Resp 16   Ht 5' 4" (1 626 m)   Wt 107 kg (235 lb)   BMI 40 34 kg/m²        Physical Exam  Constitutional:       Appearance: She is well-developed  She is obese  Eyes:      Conjunctiva/sclera: Conjunctivae normal    Neck:      Thyroid: No thyromegaly  Vascular: No JVD  Cardiovascular:      Rate and Rhythm: Normal rate and regular rhythm  Heart sounds: Normal heart sounds  No murmur heard  No friction rub  No gallop  Pulmonary:      Effort: Pulmonary effort is normal       Breath sounds: Normal breath sounds  No wheezing or rales  Abdominal:      General: Bowel sounds are normal  There is no distension  Palpations: Abdomen is soft  Tenderness: There is no abdominal tenderness  Musculoskeletal:      Cervical back: Neck supple  Neurological:      Mental Status: She is alert                  Tong Churchill MD

## 2022-01-27 NOTE — PROGRESS NOTES
[facility]  coding opportunities      [facility]  coding opportunities          Number of diagnosis code(s) already on the problem list added to FYI fla                  Number of suggestions NOT actually used: 1     Patients insurance company: [employer] (Medicare and [employer] for [employer] and [employer])     Visit status: Patient arrived for their scheduled appointment              Number of diagnosis code(s) already on the problem list added to FYI fla                     Patients insurance company: [employer] (Medicare and Commercial for [employer] and [employer])           Please review/recertify the following conditions for :     E66 01 Morbid Obesity     If this is correct, please assess and document during your next visit, February 3

## 2022-02-03 ENCOUNTER — OFFICE VISIT (OUTPATIENT)
Dept: FAMILY MEDICINE CLINIC | Facility: CLINIC | Age: 57
End: 2022-02-03
Payer: COMMERCIAL

## 2022-02-03 VITALS
BODY MASS INDEX: 40.12 KG/M2 | HEART RATE: 64 BPM | TEMPERATURE: 97.3 F | RESPIRATION RATE: 16 BRPM | DIASTOLIC BLOOD PRESSURE: 80 MMHG | SYSTOLIC BLOOD PRESSURE: 140 MMHG | HEIGHT: 64 IN | WEIGHT: 235 LBS

## 2022-02-03 DIAGNOSIS — I10 PRIMARY HYPERTENSION: ICD-10-CM

## 2022-02-03 DIAGNOSIS — H34.8392 BRANCH RETINAL VEIN OCCLUSION, UNSPECIFIED COMPLICATION STATUS, UNSPECIFIED LATERALITY: Primary | ICD-10-CM

## 2022-02-03 PROCEDURE — 99213 OFFICE O/P EST LOW 20 MIN: CPT | Performed by: INTERNAL MEDICINE

## 2022-02-03 RX ORDER — LISINOPRIL 5 MG/1
5 TABLET ORAL DAILY
Qty: 30 TABLET | Refills: 5 | Status: SHIPPED | OUTPATIENT
Start: 2022-02-03 | End: 2022-08-02

## 2022-02-03 NOTE — ASSESSMENT & PLAN NOTE
BP elevated today, will start low dose lisinopril to decrease bp readings as well as risk for further issues  F/u in 3 weeks for bp recheck and will have labs done in the meantime

## 2022-02-08 LAB
ALBUMIN SERPL-MCNC: 4.1 G/DL (ref 3.8–4.9)
ALBUMIN/GLOB SERPL: 1.6 {RATIO} (ref 1.2–2.2)
ALP SERPL-CCNC: 71 IU/L (ref 44–121)
ALT SERPL-CCNC: 21 IU/L (ref 0–32)
AST SERPL-CCNC: 18 IU/L (ref 0–40)
BASOPHILS # BLD AUTO: 0 X10E3/UL (ref 0–0.2)
BASOPHILS NFR BLD AUTO: 1 %
BILIRUB SERPL-MCNC: <0.2 MG/DL (ref 0–1.2)
BUN SERPL-MCNC: 16 MG/DL (ref 6–24)
BUN/CREAT SERPL: 23 (ref 9–23)
CALCIUM SERPL-MCNC: 9.5 MG/DL (ref 8.7–10.2)
CHLORIDE SERPL-SCNC: 103 MMOL/L (ref 96–106)
CHOLEST SERPL-MCNC: 244 MG/DL (ref 100–199)
CHOLEST/HDLC SERPL: 5.2 RATIO (ref 0–4.4)
CO2 SERPL-SCNC: 21 MMOL/L (ref 20–29)
CREAT SERPL-MCNC: 0.69 MG/DL (ref 0.57–1)
EOSINOPHIL # BLD AUTO: 0.2 X10E3/UL (ref 0–0.4)
EOSINOPHIL NFR BLD AUTO: 3 %
ERYTHROCYTE [DISTWIDTH] IN BLOOD BY AUTOMATED COUNT: 12.9 % (ref 11.7–15.4)
EST. AVERAGE GLUCOSE BLD GHB EST-MCNC: 123 MG/DL
GLOBULIN SER-MCNC: 2.5 G/DL (ref 1.5–4.5)
GLUCOSE SERPL-MCNC: 88 MG/DL (ref 65–99)
HBA1C MFR BLD: 5.9 % (ref 4.8–5.6)
HCT VFR BLD AUTO: 41.5 % (ref 34–46.6)
HDLC SERPL-MCNC: 47 MG/DL
HGB BLD-MCNC: 13.5 G/DL (ref 11.1–15.9)
IMM GRANULOCYTES # BLD: 0 X10E3/UL (ref 0–0.1)
IMM GRANULOCYTES NFR BLD: 0 %
LDLC SERPL CALC-MCNC: 175 MG/DL (ref 0–99)
LYMPHOCYTES # BLD AUTO: 2.9 X10E3/UL (ref 0.7–3.1)
LYMPHOCYTES NFR BLD AUTO: 40 %
MCH RBC QN AUTO: 28.8 PG (ref 26.6–33)
MCHC RBC AUTO-ENTMCNC: 32.5 G/DL (ref 31.5–35.7)
MCV RBC AUTO: 89 FL (ref 79–97)
MICRODELETION SYND BLD/T FISH: NORMAL
MONOCYTES # BLD AUTO: 0.6 X10E3/UL (ref 0.1–0.9)
MONOCYTES NFR BLD AUTO: 8 %
NEUTROPHILS # BLD AUTO: 3.4 X10E3/UL (ref 1.4–7)
NEUTROPHILS NFR BLD AUTO: 48 %
PLATELET # BLD AUTO: 460 X10E3/UL (ref 150–450)
POTASSIUM SERPL-SCNC: 4.7 MMOL/L (ref 3.5–5.2)
PROT SERPL-MCNC: 6.6 G/DL (ref 6–8.5)
RBC # BLD AUTO: 4.68 X10E6/UL (ref 3.77–5.28)
SL AMB EGFR AFRICAN AMERICAN: 114 ML/MIN/1.73
SL AMB EGFR NON AFRICAN AMERICAN: 99 ML/MIN/1.73
SL AMB VLDL CHOLESTEROL CALC: 22 MG/DL (ref 5–40)
SODIUM SERPL-SCNC: 139 MMOL/L (ref 134–144)
TRIGL SERPL-MCNC: 123 MG/DL (ref 0–149)
WBC # BLD AUTO: 7.1 X10E3/UL (ref 3.4–10.8)
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
## 2022-02-17 ENCOUNTER — PREP FOR PROCEDURE (OUTPATIENT)
Dept: BARIATRICS | Facility: CLINIC | Age: 57
End: 2022-02-17

## 2022-02-17 ENCOUNTER — CLINICAL SUPPORT (OUTPATIENT)
Dept: BARIATRICS | Facility: CLINIC | Age: 57
End: 2022-02-17

## 2022-02-17 VITALS
WEIGHT: 234.4 LBS | DIASTOLIC BLOOD PRESSURE: 92 MMHG | BODY MASS INDEX: 40.02 KG/M2 | SYSTOLIC BLOOD PRESSURE: 140 MMHG | HEART RATE: 64 BPM | HEIGHT: 64 IN

## 2022-02-17 DIAGNOSIS — Z98.84 BARIATRIC SURGERY STATUS: Primary | ICD-10-CM

## 2022-02-17 DIAGNOSIS — F32.A DEPRESSION: Primary | ICD-10-CM

## 2022-02-17 DIAGNOSIS — E66.01 MORBID (SEVERE) OBESITY DUE TO EXCESS CALORIES (HCC): ICD-10-CM

## 2022-02-17 DIAGNOSIS — E66.01 MORBID (SEVERE) OBESITY DUE TO EXCESS CALORIES (HCC): Primary | ICD-10-CM

## 2022-02-17 PROCEDURE — RECHECK

## 2022-02-17 NOTE — PROGRESS NOTES
Bariatric Nutrition Assessment Note    Type of surgery    Preop (3+1 90 days from first visit with surgeon, therefore checks don't start until surgeon appt on April 29th)  Surgery Date: TBD  Surgeon: Dr Laurel Gamble (consult on 4/29/22)    Nutrition Assessment   Winifred Rae Ramya  49 y o   female     Wt with BMI of 25: 146#  Pre-Op Excess Wt: 88 4#  Height 5' 4" (1 626 m), weight 106 kg (234 lb 6 4 oz)  Body mass index is 40 23 kg/m²  Body mass index is 40 23 kg/m²  Weight History   Onset of Obesity: Adult--in HS was 125lbs, in 20-30's was average 135lbs, started putting on weight at the end of her 30's up to about 160lbs, in her 42's went from 165-200 quickly, then quit smoking [id number] and gained another 15lbs  Family history of obesity: Yes  Wt Loss Attempts: Exercise  High Protein/Low CHO diets (Atkins, Union, etc )  Self Created Diets (Portion Control, Healthy Food Choices, etc )  Maximum Wt Lost: 20-30lbs  Highest wt was 250 about 3 years ago, started calorie counting and lost and now maintains between 230-235#--consumes about 2400 cals/day    Review of History and Medications   PMH per pt:    H/o of blood clot in eye  Recent dx of HTN  Pre-dm    Past Medical History:   Diagnosis Date    Abnormal Pap smear of cervix     Colon polyp     Depression     Hyperlipidemia     Obesity (BMI 30-39  9)      Past Surgical History:   Procedure Laterality Date    COLONOSCOPY      DILATION AND CURETTAGE OF UTERUS      [facility] COLONOSCOPY FLX DX W/COLLJ SPEC WHEN PFRMD N/A 3/12/2018    Procedure: COLONOSCOPY;  Surgeon: Rosa Morrison MD;  [facility];   Service: Gastroenterology     Social History     Socioeconomic History    Marital status: /Civil Union     Spouse name: Not on file    Number of children: Not on file    Years of education: Not on file    Highest education level: Not on file   Occupational History    Not on file   Tobacco Use    Smoking status: Former Smoker     Quit date: 5/19/2012 Years since quittin 7    Smokeless tobacco: Never Used    Tobacco comment: 6 years   Vaping Use    Vaping Use: Former    Quit date: 2019    Substances: Nicotine, Flavoring   Substance and Sexual Activity    Alcohol use: Yes     Comment: Occs     Drug use: No    Sexual activity: Not on file   Other Topics Concern    Not on file   Social History Narrative    Not on file     Social Determinants of Health     Financial Resource Strain: Not on file   Food Insecurity: Not on file   Transportation Needs: Not on file   Physical Activity: Not on file   Stress: Not on file   Social Connections: Not on file   Intimate Partner Violence: Not on file   Housing Stability: Not on file       Current Outpatient Medications:     lisinopril (ZESTRIL) 5 mg tablet, Take 1 tablet (5 mg total) by mouth daily, Disp: 30 tablet, Rfl: 5     Food Intake and Lifestyle Assessment   Food Intake Assessment completed via usual diet recall  Some days goes into the office for work and some days works home  Breakfast: into work at 6:30am, but doesn't eat until 9-10am:  vanilla ensure (original 220cals)+ nature [place] PB bar + diet cran juice + 2 cups of black coffee  On weekends will have 2 eggs, sausage, toast w/ little butter  Snack: nuts   Lunch: 12-1pm:  will bring lunch when goes to the office--[employer] frozen dinner--turkey or baked chicken meal (about 500cals)  Snack: 4pm-soup  Long commute--works for "[employer] in [facility] so can be 1 5-2hr commute  Dinner 6-7pm: if  cooks will be comfort food-meatloaf, mashed potatoes, veggies OR will stop for fast food on way home OR IP[employer] [place] mac&cheese (790cals) OR shake and bake pork chops OR pasta OR if works from home she makes a salad with chicken, cheese, veggies 2tbsp of 200 jia dressing, 100 jia pack nuts (maybe adding up to 500 cals per salad) + the dinner  Snack: since quite smoking will grab 3 cream caramels (3=100 cals)    If wakes in middle of the night will grab [employer] crackers (200 cals) or NV bar (200 cals) or pack nuts (100 cals)  Beverage intake: water, sugar free beverages and coffee/tea  Protein supplement: Ensure, but original   Estimated protein intake per day: 70-80gm  Estimated fluid intake per day: 2 cups of black coffee, 8oz diet cran, 64oz water per day  Meals eaten away from home: 1x/week--fast food (Quarter pounder w/cheese and fries) OR pizza (3 slices)  Typical meal pattern: 3 meals per day and 2-3 snacks per day  Eating Behaviors: Consumption of high calorie/ high fat foods, Large portion sizes, Frequent snacking/ grazing, Mindless eating, Craves sweet foods and Craves salty foods    Food allergies or intolerances: Allergies   Allergen Reactions    Shrimp Flavor - Food Allergy Itching     Cultural or Alevism considerations: none    Physical Assessment  Physical Activity  Types of exercise: Likes to walk, but more difficult these days  Used to do 3-4 miles, but now down to 1 mile  When works form home does do about 1hr of activity--walking, dvd, weights  Current physical limitations: Joint pain and plantar facetious    Psychosocial Assessment   Support systems: significant other friend(s) relative(s)  Socioeconomic factors: none  Lives with   He does a lot of the cooking (Feels gained a lot of weight since  15yrs ago)  They both do the shopping    Nutrition Diagnosis  Diagnosis: Overweight / Obesity (NC-3 3)  Related to: Physical inactivity and Excessive energy intake  As Evidenced by: BMI >25     Nutrition Prescription: Recommend the following diet  Regular    Interventions and Teaching   Discussed pre-op and post-op nutrition guidelines  Patient educated and handouts provided    Surgical changes to stomach / GI  Capacity of post-surgery stomach  Diet progression  Adequate hydration  Sugar and fat restriction to decrease "dumping syndrome"  Fat restriction to decrease steatorrhea  Expected weight loss  Weight loss plateaus/ possibility of weight regain  Exercise  Suggestions for pre-op diet  Nutrition considerations after surgery  Protein supplements  Meal planning and preparation  Appropriate carbohydrate, protein, and fat intake, and food/fluid choices to maximize safe weight loss, nutrient intake, and tolerance   Dietary and lifestyle changes  Possible problems with poor eating habits  Intuitive eating  Techniques for self monitoring and keeping daily food journal  Potential for food intolerance after surgery, and ways to deal with them including: lactose intolerance, nausea, reflux, vomiting, diarrhea, food intolerance, appetite changes, gas  Vitamin / Mineral supplementation of Multivitamin with minerals and Vitamin D    Education provided to: patient    Barriers to learning: No barriers identified  Readiness to change: preparation    Prior research on procedure: books, internet and friends or family    Comprehension: verbalizes understanding     Expected Compliance: good    Recommendations  Pt is an appropriate candidate for surgery   Yes    Evaluation / Monitoring  Dietitian to Monitor: Eating pattern as discussed Tolerance of nutrition prescription Body weight Lab values Physical activity    Pre-op wt loss:  Do not gain weight  Lose ~5% by DOS w/lifestylae changes and pre-op diet:  -12lbs (222#)  Can go to a BMI of 72=849# at 64"    Goals  · Food journal with her spreadsheet or try [employer]  · Exercise 30 minutes 5 times per week--increase the strength training to start  · Complete lesson plans 1-6  · Eat 3 meals per day with protein at each meal  · Change from original ensure to ensure max protein (provided with samples) for breakfast and add fruit instead of the NV PB bar  · Aim for 4837-4894 cals per day  · Start working on eating and drinking slower  · Eliminate mindless snacking  · Will provided with lab rx once see's surgeon in April    Time Spent:   1 Hour

## 2022-02-18 ENCOUNTER — OFFICE VISIT (OUTPATIENT)
Dept: OBGYN CLINIC | Facility: CLINIC | Age: 57
End: 2022-02-18
Payer: COMMERCIAL

## 2022-02-18 ENCOUNTER — APPOINTMENT (OUTPATIENT)
Dept: RADIOLOGY | Facility: AMBULARY SURGERY CENTER | Age: 57
End: 2022-02-18
Payer: COMMERCIAL

## 2022-02-18 VITALS
HEIGHT: 64 IN | SYSTOLIC BLOOD PRESSURE: 123 MMHG | HEART RATE: 73 BPM | DIASTOLIC BLOOD PRESSURE: 82 MMHG | BODY MASS INDEX: 39.85 KG/M2 | WEIGHT: 233.4 LBS

## 2022-02-18 DIAGNOSIS — M25.512 CHRONIC LEFT SHOULDER PAIN: Primary | ICD-10-CM

## 2022-02-18 DIAGNOSIS — G89.29 CHRONIC LEFT SHOULDER PAIN: ICD-10-CM

## 2022-02-18 DIAGNOSIS — G89.29 CHRONIC LEFT SHOULDER PAIN: Primary | ICD-10-CM

## 2022-02-18 DIAGNOSIS — M25.512 CHRONIC LEFT SHOULDER PAIN: ICD-10-CM

## 2022-02-18 PROCEDURE — 1036F TOBACCO NON-USER: CPT | Performed by: PHYSICAL MEDICINE & REHABILITATION

## 2022-02-18 PROCEDURE — 99214 OFFICE O/P EST MOD 30 MIN: CPT | Performed by: PHYSICAL MEDICINE & REHABILITATION

## 2022-02-18 PROCEDURE — 73030 X-RAY EXAM OF SHOULDER: CPT

## 2022-02-18 PROCEDURE — 20610 DRAIN/INJ JOINT/BURSA W/O US: CPT | Performed by: PHYSICAL MEDICINE & REHABILITATION

## 2022-02-18 PROCEDURE — 3008F BODY MASS INDEX DOCD: CPT | Performed by: PHYSICAL MEDICINE & REHABILITATION

## 2022-02-18 RX ORDER — LIDOCAINE HYDROCHLORIDE 10 MG/ML
1 INJECTION, SOLUTION INFILTRATION; PERINEURAL
Status: COMPLETED | OUTPATIENT
Start: 2022-02-18 | End: 2022-02-18

## 2022-02-18 RX ORDER — TRIAMCINOLONE ACETONIDE 40 MG/ML
80 INJECTION, SUSPENSION INTRA-ARTICULAR; INTRAMUSCULAR
Status: COMPLETED | OUTPATIENT
Start: 2022-02-18 | End: 2022-02-18

## 2022-02-18 RX ORDER — KETOROLAC TROMETHAMINE 30 MG/ML
60 INJECTION, SOLUTION INTRAMUSCULAR; INTRAVENOUS
Status: COMPLETED | OUTPATIENT
Start: 2022-02-18 | End: 2022-02-18

## 2022-02-18 RX ADMIN — TRIAMCINOLONE ACETONIDE 80 MG: 40 INJECTION, SUSPENSION INTRA-ARTICULAR; INTRAMUSCULAR at 08:31

## 2022-02-18 RX ADMIN — KETOROLAC TROMETHAMINE 60 MG: 30 INJECTION, SOLUTION INTRAMUSCULAR; INTRAVENOUS at 08:31

## 2022-02-18 RX ADMIN — LIDOCAINE HYDROCHLORIDE 1 ML: 10 INJECTION, SOLUTION INFILTRATION; PERINEURAL at 08:31

## 2022-02-18 NOTE — PROGRESS NOTES
1  Chronic left shoulder pain  XR shoulder 2+ vw left    Large joint arthrocentesis: L subacromial bursa    Ambulatory referral to Physical Therapy     Orders Placed This Encounter   Procedures    Large joint arthrocentesis: L subacromial bursa    XR shoulder 2+ vw left    Ambulatory referral to Physical Therapy      Impression:  Left shoulder pain likely secondary to calcific tendinopathy  We discussed different treatment options and decided to proceed with a subacromial space steroid/ketorolac injection  The ketorolac was used to help break down her deposit  She will continue with her home exercise program/physical therapy  I will see her back if needed  If she continues to be symptomatic, could consider having her go for barbotage  Imaging Studies (I personally reviewed images in PACS and report):  Left shoulder x-rays most recent to this encounter reviewed  These images show mild AC joint osteoarthritis  There are calcific densities in the rotator cuff interval which likely represent calcific tendinopathy  No acute osseous abnormalities  Return if symptoms worsen or fail to improve  Patient is in agreement with the above plan  HPI:  Bob Damian is a 49 y o  female  who presents for evaluation of   Chief Complaint   Patient presents with    Left Shoulder - Pain       Onset/Mechanism: Chronic pain for a few months after walking a big pit bull  Location: Anterolateral shoulder  Radiation: Denies  Provocative: Walking the dog  Severity: Not improving  Associated Symptoms: Trouble with overhead activities  Treatment so far: No recent treatment  Following history reviewed and updated:  Past Medical History:   Diagnosis Date    Abnormal Pap smear of cervix     Colon polyp     Depression     Hyperlipidemia     Hypertension     Obesity (BMI 30-39  9)      Past Surgical History:   Procedure Laterality Date    COLONOSCOPY      DILATION AND CURETTAGE OF UTERUS      [facility] COLONOSCOPY FLX DX W/COLLJ SPEC WHEN PFRMD N/A 3/12/2018    Procedure: COLONOSCOPY;  Surgeon: Kaylan Tadeo MD;  [facility]; Service: Gastroenterology     Social History   Social History     Substance and Sexual Activity   Alcohol Use Yes    Comment: Occs      Social History     Substance and Sexual Activity   Drug Use No     Social History     Tobacco Use   Smoking Status Former Smoker    Quit date: 2012    Years since quittin 7   Smokeless Tobacco Never Used   Tobacco Comment    6 years     Family History   Problem Relation Age of Onset    Breast cancer Paternal Grandmother     Macular degeneration Paternal Grandmother     Breast cancer Paternal Aunt     Ovarian cancer Paternal Aunt     No Known Problems Mother     Heart failure Father     Heart disease Father      Allergies   Allergen Reactions    Shrimp Flavor - Food Allergy Itching        Constitutional:  /82   Pulse 73   Ht 5' 4" (1 626 m)   Wt 106 kg (233 lb 6 4 oz)   BMI 40 06 kg/m²    General: NAD  Eyes: Anicteric sclerae  Neck: Supple  Lungs: Unlabored breathing  Cardiovascular: No lower extremity edema  Skin: Intact without erythema  Neurologic: Sensation intact to light touch  Psychiatric: Mood and affect are appropriate  Left Shoulder Exam     Tenderness   The patient is experiencing tenderness in the acromion  Range of Motion   The patient has normal left shoulder ROM      Tests   Wen test: positive  Impingement: positive    Other   Erythema: absent  Scars: absent  Sensation: normal  Pulse: present              Large joint arthrocentesis: L subacromial bursa  Universal Protocol:  Consent given by: patient  Timeout called at: 2022 8:29 AM   Site marked: the operative site was marked  Supporting Documentation  Indications: pain   Procedure Details  Location: shoulder - L subacromial bursa  Needle gauge: 21G 2''  Ultrasound guidance: no  Approach: posterolateral  Medications administered: 1 mL lidocaine 1 %; 80 mg triamcinolone acetonide 40 mg/mL; 60 mg ketorolac 60 mg/2 mL    Patient tolerance: patient tolerated the procedure well with no immediate complications  Dressing:  Sterile dressing applied    Prior to the procedure, the patient was informed of the following risks in layman terms:    - Risk of bleeding since a needle is involved  - Risk of infection (1/10,000 chance as per recent studies)  Signs/symptoms were discussed and they would prompt an urgent evaluation at an emergency department   - Risk of pigmentation or skin dimpling in the skin (2-3% chance as per recent studies) from the steroid  - Risk of increased pain from steroid flare (1% chance as per recent studies) that typically lasts 24-48 hours  - Risk of increased blood sugars from the steroid medication that can last for a few weeks  If the patient is a diabetic or pre-diabetic, they were encouraged to closely monitor their blood sugars and discuss with PCP if elevated more than usual or if having symptoms  After going over these risks, we decided that the benefits outweigh the risks and proceeded with the procedure

## 2022-02-18 NOTE — PATIENT INSTRUCTIONS
You had a cortisone injection today  Some people also refer to this as steroid or corticosteroid  There are two medicines in this injection, a numbing medicine (anesthetic) and a steroid  Most people get relief immediately but it can take up to two weeks  Rarely, some people can get a flushing reaction where they feel warm and flushed in the face  This is a side effect and resolves on its own  If you experience any drainage, redness or fevers, please give us a call or go to the nearest emergency room

## 2022-02-28 ENCOUNTER — RA CDI HCC (OUTPATIENT)
Dept: OTHER | Facility: HOSPITAL | Age: 57
End: 2022-02-28

## 2022-02-28 NOTE — PROGRESS NOTES
[facility] [address]  coding opportunities             Chart Reviewed * (Number of) [place] suggestions sent to Provider: 1        E66 01 Morbid (severe) obesity due to excess calories ([facility] )      *Per CMS/ICD 10 coding guidelines, to be used when BMI > 35 & <40 with one or more comorbidity      If this is correct, please document and assess at your next visit,3/7/2022            Patients insurance company: [employer] (Medicare and Commercial for [employer] and [employer])

## 2022-03-02 NOTE — PROGRESS NOTES
Assessment/Plan:    There are no diagnoses linked to this encounter  There are no Patient Instructions on file for this visit  No follow-ups on file  Subjective:      Patient ID: Rocco Gonzalez is a 49 y o  female  No chief complaint on file  Here for follow up visit  She has had an injection in her eye and the ophthalmologist thought things looked better  She is here to discuss her labs  Is concerned about her lipids  She has a family history of CAD in her father but is not close with him and does not really know when it started  The following portions of the patient's history were reviewed and updated as appropriate: allergies, current medications, past family history, past medical history, past social history, past surgical history and problem list     Review of Systems   Constitutional: Negative  Respiratory: Negative  Cardiovascular: Negative  Endocrine: Negative  Current Outpatient Medications   Medication Sig Dispense Refill    lisinopril (ZESTRIL) 5 mg tablet Take 1 tablet (5 mg total) by mouth daily 30 tablet 5     No current facility-administered medications for this visit  Objective: There were no vitals taken for this visit  Physical Exam  Constitutional:       Appearance: She is well-developed  She is obese  Eyes:      Conjunctiva/sclera: Conjunctivae normal    Neck:      Thyroid: No thyromegaly  Vascular: No JVD  Cardiovascular:      Rate and Rhythm: Normal rate and regular rhythm  Heart sounds: Normal heart sounds  No murmur heard  No friction rub  No gallop  Pulmonary:      Effort: Pulmonary effort is normal       Breath sounds: Normal breath sounds  No wheezing or rales  Abdominal:      General: Bowel sounds are normal  There is no distension  Palpations: Abdomen is soft  Tenderness: There is no abdominal tenderness  Musculoskeletal:      Cervical back: Neck supple     Neurological:      Mental Status: She is alert                  Tong Churchill MD

## 2022-03-07 ENCOUNTER — OFFICE VISIT (OUTPATIENT)
Dept: FAMILY MEDICINE CLINIC | Facility: CLINIC | Age: 57
End: 2022-03-07
Payer: COMMERCIAL

## 2022-03-07 VITALS
RESPIRATION RATE: 16 BRPM | WEIGHT: 230 LBS | OXYGEN SATURATION: 98 % | SYSTOLIC BLOOD PRESSURE: 132 MMHG | DIASTOLIC BLOOD PRESSURE: 78 MMHG | HEART RATE: 79 BPM | HEIGHT: 64 IN | BODY MASS INDEX: 39.27 KG/M2 | TEMPERATURE: 97.5 F

## 2022-03-07 DIAGNOSIS — E66.01 MORBID (SEVERE) OBESITY DUE TO EXCESS CALORIES (HCC): ICD-10-CM

## 2022-03-07 DIAGNOSIS — R73.03 PREDIABETES: ICD-10-CM

## 2022-03-07 DIAGNOSIS — I10 PRIMARY HYPERTENSION: Primary | ICD-10-CM

## 2022-03-07 DIAGNOSIS — E78.2 MIXED HYPERLIPIDEMIA: ICD-10-CM

## 2022-03-07 PROCEDURE — 1036F TOBACCO NON-USER: CPT | Performed by: INTERNAL MEDICINE

## 2022-03-07 PROCEDURE — 99213 OFFICE O/P EST LOW 20 MIN: CPT | Performed by: INTERNAL MEDICINE

## 2022-03-07 RX ORDER — ROSUVASTATIN CALCIUM 5 MG/1
5 TABLET, COATED ORAL DAILY
Qty: 90 TABLET | Refills: 3 | Status: SHIPPED | OUTPATIENT
Start: 2022-03-07

## 2022-03-07 NOTE — ASSESSMENT & PLAN NOTE
Discussed ratios and risk factors  She will start rosuvastatin as ordered  Possible side effects were discussed  Follow up after labs in 6 months

## 2022-03-07 NOTE — ASSESSMENT & PLAN NOTE
Well controlled and will continue lisinopril at current dosage  She continues her weight loss efforts and discussed adding exercise

## 2022-03-14 ENCOUNTER — OFFICE VISIT (OUTPATIENT)
Dept: BARIATRICS | Facility: CLINIC | Age: 57
End: 2022-03-14

## 2022-03-14 VITALS — BODY MASS INDEX: 39.09 KG/M2 | WEIGHT: 229 LBS | HEIGHT: 64 IN

## 2022-03-14 DIAGNOSIS — I10 HTN (HYPERTENSION): Primary | ICD-10-CM

## 2022-03-14 DIAGNOSIS — E66.9 OBESITY, CLASS II, BMI 35-39.9: ICD-10-CM

## 2022-03-14 DIAGNOSIS — E78.00 HYPERCHOLESTEROLEMIA: ICD-10-CM

## 2022-03-14 DIAGNOSIS — Z01.818 PRE-OP TESTING: ICD-10-CM

## 2022-03-14 PROCEDURE — 3008F BODY MASS INDEX DOCD: CPT | Performed by: INTERNAL MEDICINE

## 2022-03-14 PROCEDURE — RECHECK

## 2022-03-14 NOTE — PROGRESS NOTES
Bariatric Nutrition Assessment Note    Type of surgery    Preop (3+1 90 days from first visit with surgeon, therefore checks don't start until surgeon appt on April 29th)  Surgery Date: TBD  Surgeon: Dr Pranav Leary (consult on 4/29/22)    Nutrition Assessment   Winifred Francesco Goldman  49 y o   female     Wt with BMI of 25: 146#  Pre-Op Excess Wt: 88 4#  Height 5' 4" (1 626 m), weight 104 kg (229 lb)  Body mass index is 39 31 kg/m²  Body mass index is 39 31 kg/m²  Weight change:  -5 5# x 1 month      Weight History   Onset of Obesity: Adult--in HS was 125lbs, in 20-30's was average 135lbs, started putting on weight at the end of her 30's up to about 160lbs, in her 42's went from 165-200 quickly, then quit smoking [id number] and gained another 15lbs  Family history of obesity: Yes  Wt Loss Attempts: Exercise  High Protein/Low CHO diets (Atkins, Union, etc )  Self Created Diets (Portion Control, Healthy Food Choices, etc )  Maximum Wt Lost: 20-30lbs  Highest wt was 250 about 3 years ago, started calorie counting and lost and now maintains between 230-235#--consumes about 2400 cals/day    Review of History and Medications   PMH per pt:    H/o of blood clot in eye  Recent dx of HTN--since eval now on BP med and chol med  Pre-dm    Past Medical History:   Diagnosis Date    Abnormal Pap smear of cervix     Colon polyp     Depression     Hyperlipidemia     Hypertension     Obesity (BMI 30-39  9)      Past Surgical History:   Procedure Laterality Date    COLONOSCOPY      DILATION AND CURETTAGE OF UTERUS      [facility] COLONOSCOPY FLX DX W/COLLJ SPEC WHEN PFRMD N/A 3/12/2018    Procedure: COLONOSCOPY;  Surgeon: Cortney Ahuja MD;  [facility];   Service: Gastroenterology     Social History     Socioeconomic History    Marital status: /Civil Union     Spouse name: Not on file    Number of children: Not on file    Years of education: Not on file    Highest education level: Not on file   Occupational History    Not on file   Tobacco Use    Smoking status: Former Smoker     Quit date: 2012     Years since quittin 8    Smokeless tobacco: Never Used    Tobacco comment: 6 years   Vaping Use    Vaping Use: Former    Quit date: 2019    Substances: Nicotine, Flavoring   Substance and Sexual Activity    Alcohol use: Yes     Comment: David     Drug use: No    Sexual activity: Not on file   Other Topics Concern    Not on file   Social History Narrative    Not on file     Social Determinants of Health     Financial Resource Strain: Not on file   Food Insecurity: Not on file   Transportation Needs: Not on file   Physical Activity: Not on file   Stress: Not on file   Social Connections: Not on file   Intimate Partner Violence: Not on file   Housing Stability: Not on file       Current Outpatient Medications:     lisinopril (ZESTRIL) 5 mg tablet, Take 1 tablet (5 mg total) by mouth daily, Disp: 30 tablet, Rfl: 5    rosuvastatin (CRESTOR) 5 mg tablet, Take 1 tablet (5 mg total) by mouth daily, Disp: 90 tablet, Rfl: 3     Food Intake and Lifestyle Assessment   Food Intake Assessment completed via usual diet recall  · Tried blended greek yogurt and likes it some walnuts and fruit  · Better food choices  · Smaller portions  · Trying to eat and drink slower--being more midful  · Aiming for 1900 cals    Some days goes into the office for work and some days works home  Breakfast: 10am-Now breakfast is a blended greek yogurt with fruit (apple or peaches) and some chopped walnuts--about 400 cals  Still using the regular ensure she has, will change to the Ensure Max when they are gone (on the days goes into office will do ensure + 2 applesauce cups  Snack: not often  Lunch: 1:30-2pm:  PBJ or cheesejadaer or divine's frozen meals (400 cals)--will work on lower fat options  Snack: 4pm-1 cup cheerios dry to snack on or frosted shredded wheat    Long commute--works for [email or web address] in [facility] so can be 1 5-2hr commute  Dinner 6-7pm: Eating more salad at dinner  Other night was meatloaf w/baked potato and brussle sprouts, but she didn't eat the potato  Snack: the potato chips were in the house (ate Fri, Sat, Sun)--did portion out 2 servings (320cals)   since quite smoking will grab 3 cream caramels (3=100 cals)--now liking the calcium caramel chews    Beverage intake: water, sugar free beverages and coffee/tea  Protein supplement: Ensure, but original   Estimated protein intake per day: 70-80gm  Estimated fluid intake per day: 2 cups of black coffee, 8oz diet cran, 64oz water per day  Meals eaten away from home: 1x/week--fast food (Quarter pounder w/cheese and fries)--no fast food in the past month OR pizza (3 slices)  Typical meal pattern: 3 meals per day and 2-3 snacks per day  Eating Behaviors: Consumption of high calorie/ high fat foods, Large portion sizes, Frequent snacking/ grazing, Mindless eating, Craves sweet foods and Craves salty foods    Food allergies or intolerances: Allergies   Allergen Reactions    Shrimp Flavor - Food Allergy Itching     Cultural or Mandaeism considerations: none    Physical Assessment  Physical Activity  Types of exercise: Likes to walk, but more difficult these days  Used to do 3-4 miles, but now down to 1 mile  When works form home does do about 1hr of activity--walking, dvd, weights     Current physical limitations: Joint pain and plantar facetious    Psychosocial Assessment   Support systems: significant other friend(s) relative(s)  Socioeconomic factors: none  Lives with   He does a lot of the cooking (Feels gained a lot of weight since  15yrs ago)  They both do the shopping    Nutrition Diagnosis  Diagnosis: Overweight / Obesity (NC-3 3)  Related to: Physical inactivity and Excessive energy intake  As Evidenced by: BMI >25     Nutrition Prescription: Recommend the following diet  Regular    Interventions and Teaching   Discussed pre-op and post-op nutrition guidelines  Patient educated and handouts provided  Surgical changes to stomach / GI  Capacity of post-surgery stomach  Diet progression  Adequate hydration  Sugar and fat restriction to decrease "dumping syndrome"  Fat restriction to decrease steatorrhea  Expected weight loss  Weight loss plateaus/ possibility of weight regain  Exercise  Suggestions for pre-op diet  Nutrition considerations after surgery  Protein supplements  Meal planning and preparation  Appropriate carbohydrate, protein, and fat intake, and food/fluid choices to maximize safe weight loss, nutrient intake, and tolerance   Dietary and lifestyle changes  Possible problems with poor eating habits  Intuitive eating  Techniques for self monitoring and keeping daily food journal  Potential for food intolerance after surgery, and ways to deal with them including: lactose intolerance, nausea, reflux, vomiting, diarrhea, food intolerance, appetite changes, gas  Vitamin / Mineral supplementation of Multivitamin with minerals and Vitamin D    Education provided to: patient    Barriers to learning: No barriers identified  Readiness to change: preparation    Prior research on procedure: books, internet and friends or family    Comprehension: verbalizes understanding     Expected Compliance: good    Recommendations  Pt is an appropriate candidate for surgery   Yes    Evaluation / Monitoring  Dietitian to Monitor: Eating pattern as discussed Tolerance of nutrition prescription Body weight Lab values Physical activity    Pre-op wt loss:  Do not gain weight  Lose ~5% by DOS w/lifestylae changes and pre-op diet:  -12lbs (222#)  Can go to a BMI of 80=296# at 64"    Workflow:   Psych and/or D+A Clearance: n/a   PCP Letter: completed    Support Group: incomplete   Surgeon Appt  scheduled for 4/29/22   EGD scheduled for 4/15/22   Cardiac Risk Assessment will scheduled in May/June   Sleep Studies n/a 3 of 8 stop band   Blood work CBC, CMP, A1c, lipids on 2/7/22 acceptable, needs TSH   Nicotine test n/a   Pre-Operative Program: 3 + 1 started with surgeon appt on 4/29   Weight Loss: see above    Pt surprised herself with a 5 5 lb weight loss in the past month  She has been tracking her calories and trying to stay at 1900  She does states there are days that are more, but most often not going over 1900 calories  She has change up her breakfast, is working on eating and drinking slower, has been making healthier food choices and eating smaller portions  She states she "cheated" with some chips over the weekend  Advsied as long as she is mindful and it isn't everyday  Pt did portion out 2 servings instead of eating out of the bag  Provided with a list of healthier snack choices  She would like to work on eating off a smaller plate, increase the exercise and start using the Ensure Max protein      Goals  · Food journal with her spreadsheet or try [employer]  · Exercise 30 minutes 5 times per week--walking  · Complete lesson plans 1-6  · Eat 3 meals per day with protein at each meal  · Change from original ensure to ensure max protein as a meal or snack  · Aim for 3403-6277 cals per day  · Start working on eating and drinking slower  · Eliminate mindless snacking--gave snack list for options  · Eat off a smaller plate   · CMP, CBC, P7R and lipids on 2/7/22 acceptable for sx, need TSH which provied a rx for and advised to complete in April or May    Time Spent:   30 mins

## 2022-04-12 LAB — TSH SERPL DL<=0.005 MIU/L-ACNC: 1.87 UIU/ML (ref 0.45–4.5)

## 2022-04-15 ENCOUNTER — ANESTHESIA EVENT (OUTPATIENT)
Dept: PERIOP | Facility: HOSPITAL | Age: 57
End: 2022-04-15

## 2022-04-15 ENCOUNTER — HOSPITAL ENCOUNTER (OUTPATIENT)
Dept: PERIOP | Facility: HOSPITAL | Age: 57
Setting detail: OUTPATIENT SURGERY
Discharge: HOME | End: 2022-04-15
Attending: SURGERY | Admitting: SURGERY
Payer: COMMERCIAL

## 2022-04-15 ENCOUNTER — ANESTHESIA (OUTPATIENT)
Dept: PERIOP | Facility: HOSPITAL | Age: 57
End: 2022-04-15

## 2022-04-15 VITALS
OXYGEN SATURATION: 99 % | SYSTOLIC BLOOD PRESSURE: 115 MMHG | HEART RATE: 66 BPM | HEIGHT: 64 IN | BODY MASS INDEX: 38.99 KG/M2 | RESPIRATION RATE: 18 BRPM | WEIGHT: 228.4 LBS | DIASTOLIC BLOOD PRESSURE: 71 MMHG | TEMPERATURE: 97.4 F

## 2022-04-15 DIAGNOSIS — E66.01 MORBID (SEVERE) OBESITY DUE TO EXCESS CALORIES (HCC): ICD-10-CM

## 2022-04-15 PROCEDURE — 88305 TISSUE EXAM BY PATHOLOGIST: CPT | Performed by: PATHOLOGY

## 2022-04-15 PROCEDURE — 43239 EGD BIOPSY SINGLE/MULTIPLE: CPT | Performed by: SURGERY

## 2022-04-15 RX ORDER — PROPOFOL 10 MG/ML
INJECTION, EMULSION INTRAVENOUS AS NEEDED
Status: DISCONTINUED | OUTPATIENT
Start: 2022-04-15 | End: 2022-04-15

## 2022-04-15 RX ORDER — ONDANSETRON 2 MG/ML
4 INJECTION INTRAMUSCULAR; INTRAVENOUS ONCE AS NEEDED
Status: CANCELLED | OUTPATIENT
Start: 2022-04-15

## 2022-04-15 RX ORDER — LIDOCAINE HYDROCHLORIDE 10 MG/ML
INJECTION, SOLUTION EPIDURAL; INFILTRATION; INTRACAUDAL; PERINEURAL AS NEEDED
Status: DISCONTINUED | OUTPATIENT
Start: 2022-04-15 | End: 2022-04-15

## 2022-04-15 RX ORDER — SODIUM CHLORIDE, SODIUM LACTATE, POTASSIUM CHLORIDE, CALCIUM CHLORIDE 600; 310; 30; 20 MG/100ML; MG/100ML; MG/100ML; MG/100ML
125 INJECTION, SOLUTION INTRAVENOUS CONTINUOUS
Status: DISCONTINUED | OUTPATIENT
Start: 2022-04-15 | End: 2022-04-19 | Stop reason: HOSPADM

## 2022-04-15 RX ORDER — SODIUM CHLORIDE, SODIUM LACTATE, POTASSIUM CHLORIDE, CALCIUM CHLORIDE 600; 310; 30; 20 MG/100ML; MG/100ML; MG/100ML; MG/100ML
INJECTION, SOLUTION INTRAVENOUS CONTINUOUS PRN
Status: DISCONTINUED | OUTPATIENT
Start: 2022-04-15 | End: 2022-04-15

## 2022-04-15 RX ADMIN — SODIUM CHLORIDE, SODIUM LACTATE, POTASSIUM CHLORIDE, AND CALCIUM CHLORIDE: .6; .31; .03; .02 INJECTION, SOLUTION INTRAVENOUS at 08:09

## 2022-04-15 RX ADMIN — SODIUM CHLORIDE, SODIUM LACTATE, POTASSIUM CHLORIDE, AND CALCIUM CHLORIDE 125 ML/HR: .6; .31; .03; .02 INJECTION, SOLUTION INTRAVENOUS at 07:52

## 2022-04-15 RX ADMIN — LIDOCAINE HYDROCHLORIDE 50 MG: 10 INJECTION, SOLUTION EPIDURAL; INFILTRATION; INTRACAUDAL; PERINEURAL at 08:11

## 2022-04-15 RX ADMIN — PROPOFOL 150 MG: 10 INJECTION, EMULSION INTRAVENOUS at 08:11

## 2022-04-15 NOTE — DISCHARGE INSTRUCTIONS
Upper Endoscopy   WHAT YOU NEED TO KNOW:   An upper endoscopy is also called an upper gastrointestinal (GI) endoscopy, or an esophagogastroduodenoscopy (EGD)  You may feel bloated, gassy, or have some abdominal discomfort after your procedure  Your throat may be sore for 24 to 36 hours  You may burp or pass gas from air that is still inside your body  DISCHARGE INSTRUCTIONS:   Call 911 if:   · You have sudden chest pain or trouble breathing  Seek care immediately if:   · You feel dizzy or faint  · You have trouble swallowing  · You have severe throat pain  · Your bowel movements are very dark or black  · Your abdomen is hard and firm and you have severe pain  · You vomit blood  Contact your healthcare provider if:   · You feel full or bloated and cannot burp or pass gas  · You have not had a bowel movement for 3 days after your procedure  · You have neck pain  · You have a fever or chills  · You have nausea or are vomiting  · You have a rash or hives  · You have questions or concerns about your endoscopy  Relieve a sore throat:  Suck on throat lozenges or crushed ice  Gargle with a small amount of warm salt water  Mix 1 teaspoon of salt and 1 cup of warm water to make salt water  Relieve gas and discomfort from bloating:  Lie on your right side with a heating pad on your abdomen  Take short walks to help pass gas  Eat small meals until bloating is relieved  Rest after your procedure:  Do not drive or make important decisions until the day after your procedure  Return to your normal activity as directed  You can usually return to work the day after your procedure  Follow up with your healthcare provider as directed:  Write down your questions so you remember to ask them during your visits  © Copyright [employer] 2022 Information is for End User's use only and may not be sold, redistributed or otherwise used for commercial purposes   All illustrations and images included in CareNotes® are the copyrighted property of A [employer] M , Inc  or [employer]   The above information is an  only  It is not intended as medical advice for individual conditions or treatments  Talk to your doctor, nurse or pharmacist before following any medical regimen to see if it is safe and effective for you

## 2022-04-15 NOTE — ANESTHESIA PREPROCEDURE EVALUATION
Procedure:  EGD    Relevant Problems   ANESTHESIA (within normal limits)      CARDIO   (+) Mixed hyperlipidemia   (+) Primary hypertension      MUSCULOSKELETAL   (+) Rotator cuff impingement syndrome of right shoulder      NEURO/PSYCH   (+) Chronic left shoulder pain      PULMONARY (within normal limits)        Physical Exam    Airway    Mallampati score: III  TM Distance: >3 FB  Neck ROM: full     Dental   No notable dental hx     Cardiovascular  Cardiovascular exam normal    Pulmonary  Pulmonary exam normal     Other Findings        Anesthesia Plan  ASA Score- 3     Anesthesia Type- IV sedation with anesthesia with ASA Monitors  Additional Monitors:   Airway Plan:           Plan Factors-Exercise tolerance (METS): >4 METS  Chart reviewed  EKG reviewed  Existing labs reviewed  Patient summary reviewed  Patient is not a current smoker  Induction-     Postoperative Plan-     Informed Consent- Anesthetic plan and risks discussed with patient  I personally reviewed this patient with the CRNA  Discussed and agreed on the Anesthesia Plan with the JAZMYN Mosley

## 2022-04-15 NOTE — H&P
This is a 49 y o  female with a history of morbid obesity and Body mass index is 39 2 kg/m²  Here for an EGD to evaluate the anatomy of the GI tract and to rule out the presence of H  pylori  Physical Exam    /76   Pulse 73   Temp (!) 97 4 °F (36 3 °C) (Temporal)   Resp 20   Ht 5' 4" (1 626 m)   Wt 104 kg (228 lb 6 4 oz)   SpO2 97%   BMI 39 20 kg/m²    AAOx3  RRR  CTA B  Abdomen obese  Benign  A/P:    This is a 49 y o  female with a history of morbid obesity and Body mass index is 39 2 kg/m²       Will proceed with the EGD and biopsies        Mimi Mason MD  4/15/2022  7:45 AM

## 2022-04-29 ENCOUNTER — OFFICE VISIT (OUTPATIENT)
Dept: BARIATRICS | Facility: CLINIC | Age: 57
End: 2022-04-29
Payer: COMMERCIAL

## 2022-04-29 VITALS
HEART RATE: 79 BPM | BODY MASS INDEX: 39.5 KG/M2 | HEIGHT: 64 IN | WEIGHT: 231.4 LBS | DIASTOLIC BLOOD PRESSURE: 84 MMHG | SYSTOLIC BLOOD PRESSURE: 128 MMHG

## 2022-04-29 DIAGNOSIS — E88.81 METABOLIC SYNDROME: ICD-10-CM

## 2022-04-29 DIAGNOSIS — R73.03 PREDIABETES: ICD-10-CM

## 2022-04-29 DIAGNOSIS — E78.2 MIXED HYPERLIPIDEMIA: ICD-10-CM

## 2022-04-29 DIAGNOSIS — E66.9 OBESITY, CLASS II, BMI 35-39.9: ICD-10-CM

## 2022-04-29 DIAGNOSIS — E66.01 MORBID (SEVERE) OBESITY DUE TO EXCESS CALORIES (HCC): ICD-10-CM

## 2022-04-29 DIAGNOSIS — I10 PRIMARY HYPERTENSION: ICD-10-CM

## 2022-04-29 DIAGNOSIS — Z01.818 ENCOUNTER FOR OTHER PREPROCEDURAL EXAMINATION: Primary | ICD-10-CM

## 2022-04-29 PROBLEM — E88.810 METABOLIC SYNDROME: Status: ACTIVE | Noted: 2022-04-29

## 2022-04-29 PROBLEM — E66.812 OBESITY, CLASS II, BMI 35-39.9: Status: ACTIVE | Noted: 2022-04-29

## 2022-04-29 PROCEDURE — 99204 OFFICE O/P NEW MOD 45 MIN: CPT | Performed by: SURGERY

## 2022-04-29 PROCEDURE — 3008F BODY MASS INDEX DOCD: CPT | Performed by: SURGERY

## 2022-04-29 PROCEDURE — 1036F TOBACCO NON-USER: CPT | Performed by: SURGERY

## 2022-04-29 NOTE — PROGRESS NOTES
BARIATRIC INITIAL CONSULT - BARIATRIC SURGERY    Winifred Saray Lee 49 y o  female MRN: [id number]  Unit/Bed#:  Encounter: [id number]      HPI:  Teresa Gamez is a 49 y o  female who presents with a longstanding history of morbid obesity and inability to sustain a meaningful weight loss  She is an  at [employer]  She desires to pursue metabolic and bariatric surgery to improve her health and metabolic syndrome  Denies GERD  Denies tobacco  NSAIDs but will stop  Denies DVT/PE  Here today to discuss bariatric options  Visit type: initial visit    Symptoms: knee pain and back pain    Associated Symptoms: none    Associated Conditions: glucose intolerance, hyperlipidemia, elevated LDL and Stress incontinence  Disease Complications: hypertension and osteoarthritis  Weight Loss Interest: high    Exercise Frequency:daily  Types of Exercise: walking      Review of Systems   Genitourinary:        Mild stress incontinence   Musculoskeletal: Positive for arthralgias and back pain  All other systems reviewed and are negative  Historical Information   Past Medical History:   Diagnosis Date    Abnormal Pap smear of cervix     Colon polyp     Depression     Hyperlipidemia     Hypertension     Obesity (BMI 30-39  9)      Past Surgical History:   Procedure Laterality Date    COLONOSCOPY      DENTAL IMPLANT      DILATION AND CURETTAGE OF UTERUS      EGD  04/15/2022    [facility] COLONOSCOPY FLX DX W/COLLJ SPEC WHEN PFRMD N/A 3/12/2018    Procedure: COLONOSCOPY;  Surgeon: Mike Brandt MD;  [facility];   Service: Gastroenterology     Social History   Social History     Substance and Sexual Activity   Alcohol Use Yes    Comment: Occs      Social History     Substance and Sexual Activity   Drug Use No     Social History     Tobacco Use   Smoking Status Former Smoker    Quit date: 2015    Years since quittin 8   Smokeless Tobacco Never Used   Tobacco Comment    quit 2015     Family History: non-contributory    Meds/Allergies   all medications and allergies reviewed  Allergies   Allergen Reactions    Shrimp Flavor - Food Allergy Itching       Objective       Current Vitals:   /84 (BP Location: Left arm, Patient Position: Sitting, Cuff Size: Large)   Pulse 79   Ht 5' 4" (1 626 m)   Wt 105 kg (231 lb 6 4 oz)   BMI 39 72 kg/m²       Invasive Devices  Report    None                 Physical Exam  Constitutional:       Appearance: Normal appearance  HENT:      Head: Atraumatic  Nose: No rhinorrhea  Eyes:      Extraocular Movements: Extraocular movements intact  Cardiovascular:      Rate and Rhythm: Normal rate  Pulmonary:      Effort: Pulmonary effort is normal  No respiratory distress  Abdominal:      General: Abdomen is flat  There is no distension  Musculoskeletal:         General: Normal range of motion  Cervical back: Normal range of motion  Skin:     General: Skin is warm and dry  Neurological:      General: No focal deficit present  Mental Status: She is alert and oriented to person, place, and time  Psychiatric:         Mood and Affect: Mood normal          Behavior: Behavior normal          Lab Results: I have personally reviewed pertinent lab results  Imaging: I have personally reviewed pertinent reports  EKG, Pathology, and Other Studies: I have personally reviewed pertinent reports  Assessment/PLAN:    49 y o  yo female with a long standing h/o of obesity and inability to sustain any meaningful weight loss on her own despite several attempts  She is interested in the Laparoscopic willian-en-y gastric bypass  As a part of her pre op evaluation, she will be referred to a cardiologist and for a sleep evaluation and consult after successfully completing an evaluation with our pre-certification/, registered dietician and licensed clinical       She had an EGD to evaluate the anatomy of her GI tract prior to the operation  I have spent over 45 minutes with her face to face in the office today discussing her options and details of the surgery  We have seen an animation of the surgery on the computer that illustrates how the operation is done and how the anatomy will be altered with the procedure  Over 50% of this was coordinating care  She was given the opportunity to ask questions and I have answered all of them  I have discussed and educated the patient with regards to the components of our multidisciplinary program and the importance of compliance and follow up in the post operative period  The patient was also instructed with regards to the importance of behavior modification, nutritional counseling, support meeting attendance and lifestyle changes that are important to ensure success  Although there is a great statistical chance of improvement or even resolution of most of her associated comorbidities, the results vary from patient to patient and they largely depend on her commitment and compliance  Weight loss goal will be determined at the time of her evaluation      Jeimy Spaulding MD  4/29/2022  11:56 AM

## 2022-04-29 NOTE — LETTER
April 29, 2022     Ekaterina Ng MD  [address]  [address] [facility] [address]    Patient: Caro Roque   YOB: 1965   Date of Visit: 4/29/2022       Dear Dr Burt Solano:    Thank you for referring Marcelino Goldmantles to me for evaluation for metabolic and bariatric surgery  Below are my notes for this consultation  If you have questions, please do not hesitate to call me  I look forward to following your patient along with you  Sincerely,        Chasidy Loo MD        CC: No Recipients  Chasidy Loo MD  4/29/2022 11:59 AM  Sign when Signing Visit      BARIATRIC INITIAL CONSULT - [address] 49 y o  female MRN: [id number]  Unit/Bed#:  Encounter: [id number]      HPI:  Caro Roque is a 49 y o  female who presents with a longstanding history of morbid obesity and inability to sustain a meaningful weight loss  She is an  at [employer]  She desires to pursue metabolic and bariatric surgery to improve her health and metabolic syndrome  Denies GERD  Denies tobacco  NSAIDs but will stop  Denies DVT/PE  Here today to discuss bariatric options  Visit type: initial visit    Symptoms: knee pain and back pain    Associated Symptoms: none    Associated Conditions: glucose intolerance, hyperlipidemia, elevated LDL and Stress incontinence  Disease Complications: hypertension and osteoarthritis  Weight Loss Interest: high    Exercise Frequency:daily  Types of Exercise: walking      Review of Systems   Genitourinary:        Mild stress incontinence   Musculoskeletal: Positive for arthralgias and back pain  All other systems reviewed and are negative  Historical Information   Past Medical History:   Diagnosis Date    Abnormal Pap smear of cervix     Colon polyp     Depression     Hyperlipidemia     Hypertension     Obesity (BMI 30-39  9)      Past Surgical History:   Procedure Laterality Date    COLONOSCOPY      DENTAL IMPLANT      DILATION AND CURETTAGE OF UTERUS      EGD  04/15/2022    [facility] COLONOSCOPY FLX DX W/COLLJ SPEC WHEN PFRMD N/A 3/12/2018    Procedure: COLONOSCOPY;  Surgeon: Arvind Rasmussen MD;  [facility]; Service: Gastroenterology     Social History   Social History     Substance and Sexual Activity   Alcohol Use Yes    Comment: Occs      Social History     Substance and Sexual Activity   Drug Use No     Social History     Tobacco Use   Smoking Status Former Smoker    Quit date: 2015    Years since quittin 8   Smokeless Tobacco Never Used   Tobacco Comment    quit 2015     Family History: non-contributory    Meds/Allergies   all medications and allergies reviewed  Allergies   Allergen Reactions    Shrimp Flavor - Food Allergy Itching       Objective       Current Vitals:   /84 (BP Location: Left arm, Patient Position: Sitting, Cuff Size: Large)   Pulse 79   Ht 5' 4" (1 626 m)   Wt 105 kg (231 lb 6 4 oz)   BMI 39 72 kg/m²       Invasive Devices  Report    None                 Physical Exam  Constitutional:       Appearance: Normal appearance  HENT:      Head: Atraumatic  Nose: No rhinorrhea  Eyes:      Extraocular Movements: Extraocular movements intact  Cardiovascular:      Rate and Rhythm: Normal rate  Pulmonary:      Effort: Pulmonary effort is normal  No respiratory distress  Abdominal:      General: Abdomen is flat  There is no distension  Musculoskeletal:         General: Normal range of motion  Cervical back: Normal range of motion  Skin:     General: Skin is warm and dry  Neurological:      General: No focal deficit present  Mental Status: She is alert and oriented to person, place, and time  Psychiatric:         Mood and Affect: Mood normal          Behavior: Behavior normal          Lab Results: I have personally reviewed pertinent lab results  Imaging: I have personally reviewed pertinent reports      EKG, Pathology, and Other Studies: I have personally reviewed pertinent reports  Assessment/PLAN:    49 y o  yo female with a long standing h/o of obesity and inability to sustain any meaningful weight loss on her own despite several attempts  She is interested in the Laparoscopic willian-en-y gastric bypass  As a part of her pre op evaluation, she will be referred to a cardiologist and for a sleep evaluation and consult after successfully completing an evaluation with our pre-certification/, registered dietician and licensed clinical   She had an EGD to evaluate the anatomy of her GI tract prior to the operation  I have spent over 45 minutes with her face to face in the office today discussing her options and details of the surgery  We have seen an animation of the surgery on the computer that illustrates how the operation is done and how the anatomy will be altered with the procedure  Over 50% of this was coordinating care  She was given the opportunity to ask questions and I have answered all of them  I have discussed and educated the patient with regards to the components of our multidisciplinary program and the importance of compliance and follow up in the post operative period  The patient was also instructed with regards to the importance of behavior modification, nutritional counseling, support meeting attendance and lifestyle changes that are important to ensure success  Although there is a great statistical chance of improvement or even resolution of most of her associated comorbidities, the results vary from patient to patient and they largely depend on her commitment and compliance  Weight loss goal will be determined at the time of her evaluation      Leatha Shipman MD  4/29/2022  11:56 AM

## 2022-05-03 NOTE — TELEPHONE ENCOUNTER
Spoke with pt, stated her boss was covid positive, she is now having slight SOB and a cough  No fever     Per dr Steph Galvan, pt will go to care now in [place] and be tested  Yareli at [facility] informed, pt will call when she is there and they will go out and get her from the car  [facility] No

## 2022-05-27 ENCOUNTER — OFFICE VISIT (OUTPATIENT)
Dept: BARIATRICS | Facility: CLINIC | Age: 57
End: 2022-05-27

## 2022-05-27 VITALS — BODY MASS INDEX: 39.07 KG/M2 | WEIGHT: 227.6 LBS

## 2022-05-27 DIAGNOSIS — E66.9 OBESITY, CLASS II, BMI 35-39.9: Primary | ICD-10-CM

## 2022-05-27 PROCEDURE — RECHECK

## 2022-05-27 NOTE — PROGRESS NOTES
Patient presents for 2 of 3 weight check, current weight 227 6lbs  Eating behaviors/food choices: Patient reports a challenging few weeks because she was not in her home  She is having renovations done so she had to stay in a hotel for some time and needed to make decisions about food choices  She felt she did well, relying on apples and peanut butter for a snack and is logging her foods  Patient's  still brings higher calorie snack foods into the home which she identifies as a major barrier in her efforts  She has had the discussion with him about reducing this or putting them away but explains that he "doesn't get it" and is not going to change his ways  She wants guidance on how to still follow through on her weight management journey with no support or changes that are required of him  Encouraged patient to make efforts to plan ahead, set up environment for success and practice mindfulness  Patient is aligned with putting smaller plates on a lower shelf in her cabinet and measuring tools out on counter for easier access  She is going to plan a snack on her drive home so she doesn't feel as hungry and is going to put away the unhealthy snacks when she's feeling more in control of her actions  Mental Health/Wellness:  Patient presented with frustration about her  not being as supportive in his actions as he is in his words but she is resolved that this is how it's going to be  She says she does have other supports who can help her through this process, friends who have had the surgery and she talks to  Patient is alinged with talking with them to get information to prepare for surgery and planning/thinking ahead so her future self can thank her for the effort she's putting in  Patient is worried about "failing", SW reframed that she can't fail if she doesn't give up and not to expect perfection  Continued support offered, encouraged to practice self kindness      Workflow review: Labs and PCP: TSH labs done and need to be reviewed by RD, PCP letter done  Psych and EGD: no eval needed, EGD done 4/15  Nicotine: na  Support Group: done   Cardiology: scheduled 6/13  Sleep: NA  Weight and Weight Checks: 3 + 1 weight checks, goal weight is 222lbs    Goals:    - be mindful of triggers of mindless/emotional eating, set up environment for success  - find non-food coping skills, seek support  - practice self kindness    Next Appointment:  Weight check 3 of 3 with SW on 6/24

## 2022-06-13 ENCOUNTER — CONSULT (OUTPATIENT)
Dept: CARDIOLOGY CLINIC | Facility: CLINIC | Age: 57
End: 2022-06-13
Payer: COMMERCIAL

## 2022-06-13 VITALS
HEART RATE: 74 BPM | WEIGHT: 228.2 LBS | DIASTOLIC BLOOD PRESSURE: 78 MMHG | HEIGHT: 64 IN | SYSTOLIC BLOOD PRESSURE: 124 MMHG | BODY MASS INDEX: 38.96 KG/M2 | OXYGEN SATURATION: 98 %

## 2022-06-13 DIAGNOSIS — Z87.891 FORMER SMOKER: ICD-10-CM

## 2022-06-13 DIAGNOSIS — Z98.84 BARIATRIC SURGERY STATUS: ICD-10-CM

## 2022-06-13 DIAGNOSIS — E66.09 CLASS 2 OBESITY DUE TO EXCESS CALORIES WITHOUT SERIOUS COMORBIDITY WITH BODY MASS INDEX (BMI) OF 36.0 TO 36.9 IN ADULT: ICD-10-CM

## 2022-06-13 DIAGNOSIS — E78.00 PURE HYPERCHOLESTEROLEMIA: ICD-10-CM

## 2022-06-13 DIAGNOSIS — I10 PRIMARY HYPERTENSION: ICD-10-CM

## 2022-06-13 DIAGNOSIS — Z01.810 PRE-OPERATIVE CARDIOVASCULAR EXAMINATION: Primary | ICD-10-CM

## 2022-06-13 PROCEDURE — 3008F BODY MASS INDEX DOCD: CPT | Performed by: INTERNAL MEDICINE

## 2022-06-13 PROCEDURE — 93000 ELECTROCARDIOGRAM COMPLETE: CPT | Performed by: INTERNAL MEDICINE

## 2022-06-13 PROCEDURE — 99204 OFFICE O/P NEW MOD 45 MIN: CPT | Performed by: INTERNAL MEDICINE

## 2022-06-13 NOTE — PROGRESS NOTES
[facility] [address] [place]  [address] [place] [address]  Phone#  [phone]  Fax#  [phone]                                               Cardiology Office Consult  Winifred Aguiar, 49 y o  female  YOB: 1965  MRN: [id number] Encounter: [id number]      PCP - Mitch Rodriguez MD  Referring Provider - Cindi Combs MD    Chief Complaint   Patient presents with    Consult       Assessment  Preoperative cardiovascular examination  Hypertension  Hyperlipidemia  Obesity, Body mass index is 39 17 kg/m²  Former smoker -   quit in 2015 after seeing father issues with LVAD, etc  smoked 0 5-1 PPD x 30 yrs    Plan  Pre-operative cardiovascular evaluation  Planned procedure:  Laparoscopic gastric bypass surgery  Active cardiac complaints: None  Cardiac co-morbidities: None  Functional status: Active, works desk-job full time, able to go up and down 1 flight of stairs at home without major limitations, able to walk on level ground for 1-2 blocks  No known h/o CAD, heart failure  Vitals - reviewed and stable  ECG - normal sinus rhythm, normal ECG  Will check echocardiogram   If EF normal, no severe valvular abnormalities, then okay to proceed with planned surgery as low to moderate cardiac risk    Hypertension  Blood pressure well controlled, 124/78  Continue lisinopril 5 mg daily  Follow-up with PCP    Hyperlipidemia, Obesity - Body mass index is 39 17 kg/m²       Persistently elevated cholesterol and LDL level since 2015  She was chest recently started on rosuvastatin for this and is tolerating the same  Continue rosuvastatin  Cholesterol levels should likely improve further with weight loss  Follow-up with PCP/bariatrics      Results for orders placed or performed in visit on 06/13/22   POCT ECG    Impression    Normal sinus rhythm  Normal ECG       Orders Placed This Encounter   Procedures    POCT ECG    Echo complete w/ contrast if indicated     Return if symptoms worsen or fail to improve  History of Present Illness   49 y o  female, who works as an  at [employer], comes in as a new patient for consultation regarding preoperative cardiovascular examination prior to upcoming bariatric surgical procedure  She reports no active complains of chest pain, palpitations, dizziness or lightheadedness, near-syncope or syncope  No complains of major shortness of breath while at rest or with routine day-to-day activities  She does report mild shortness of breath is twice typically climb up 1-2 flights of stairs for 2 heavy physical work, which then resolves with rest   No prior history of coronary artery disease or heart failure  She states that she has been seeing her father's failed over the last few years with CAD/heart attack and eventual LVAD and this has prompted her to make healthier lifestyle changes including quitting smoking about 7 years ago and and now trying to pursue bariatric surgery to help her exercise and do more to stay healthy  Family history -   Father - unhealthy lifestyle, had CAD/heart attack in 62s, & then ended up with has LVAD at 68, COPD  Mother - healthy, no known cardiac problems, recently diagnosed HLD      Historical Information   Past Medical History:   Diagnosis Date    Abnormal Pap smear of cervix     Colon polyp     Depression     Hyperlipidemia     Hypertension     Obesity (BMI 30-39  9)      Past Surgical History:   Procedure Laterality Date    COLONOSCOPY      DENTAL IMPLANT      DILATION AND CURETTAGE OF UTERUS      EGD  04/15/2022    UT COLONOSCOPY FLX DX W/COLLJ SPEC WHEN PFRMD N/A 3/12/2018    Procedure: COLONOSCOPY;  Surgeon: Jae Nobles MD;  Location: [facility];   Service: Gastroenterology     Family History   Problem Relation Age of Onset    Breast cancer Paternal Grandmother     Macular degeneration Paternal Grandmother     Breast cancer Paternal Aunt     Ovarian cancer Paternal Aunt     No Known Problems Mother     Heart failure Father     Heart disease Father      Current Outpatient Medications on File Prior to Visit   Medication Sig Dispense Refill    lisinopril (ZESTRIL) 5 mg tablet Take 1 tablet (5 mg total) by mouth daily (Patient taking differently: Take 5 mg by mouth daily at bedtime) 30 tablet 5    rosuvastatin (CRESTOR) 5 mg tablet Take 1 tablet (5 mg total) by mouth daily (Patient taking differently: Take 5 mg by mouth daily at bedtime) 90 tablet 3     No current facility-administered medications on file prior to visit  Allergies   Allergen Reactions    Shrimp Flavor - Food Allergy Itching     Social History     Socioeconomic History    Marital status: /Civil Union     Spouse name: Not on file    Number of children: Not on file    Years of education: Not on file    Highest education level: Not on file   Occupational History    Not on file   Tobacco Use    Smoking status: Former Smoker     Quit date: 2015     Years since quittin 9    Smokeless tobacco: Never Used    Tobacco comment: quit 2015   Vaping Use    Vaping Use: Former    Quit date: 2019    Substances: Nicotine, Flavoring   Substance and Sexual Activity    Alcohol use: Yes     Comment: Occs     Drug use: No    Sexual activity: Not on file   Other Topics Concern    Not on file   Social History Narrative    Not on file     Social Determinants of Health     Financial Resource Strain: Not on file   Food Insecurity: Not on file   Transportation Needs: Not on file   Physical Activity: Not on file   Stress: Not on file   Social Connections: Not on file   Intimate Partner Violence: Not on file   Housing Stability: Not on file        Review of Systems   All other systems reviewed and are negative          Vitals:  Vitals:    22 1441   BP: 124/78   BP Location: Left arm   Patient Position: Sitting   Cuff Size: Large   Pulse: 74   SpO2: 98%   Weight: 104 kg (228 lb 3 2 oz)   Height: 5' 4" (1 626 m) BMI - Body mass index is 39 17 kg/m²  Wt Readings from Last 7 Encounters:   06/13/22 104 kg (228 lb 3 2 oz)   05/27/22 103 kg (227 lb 9 6 oz)   04/29/22 105 kg (231 lb 6 4 oz)   04/15/22 104 kg (228 lb 6 4 oz)   03/14/22 104 kg (229 lb)   03/07/22 104 kg (230 lb)   02/18/22 106 kg (233 lb 6 4 oz)       Physical Exam  Vitals and nursing note reviewed  Constitutional:       General: She is not in acute distress  Appearance: Normal appearance  She is well-developed  She is obese  She is not ill-appearing  HENT:      Head: Normocephalic and atraumatic  Nose: No congestion  Eyes:      General: No scleral icterus  Conjunctiva/sclera: Conjunctivae normal    Neck:      Vascular: No carotid bruit or JVD  Cardiovascular:      Rate and Rhythm: Normal rate and regular rhythm  Pulses: Normal pulses  Heart sounds: Normal heart sounds  No murmur heard  No friction rub  No gallop  Pulmonary:      Effort: Pulmonary effort is normal  No respiratory distress  Breath sounds: Normal breath sounds  No rales  Abdominal:      General: There is no distension  Palpations: Abdomen is soft  Tenderness: There is no abdominal tenderness  Musculoskeletal:         General: No swelling or tenderness  Cervical back: Neck supple  Right lower leg: No edema  Left lower leg: No edema  Skin:     General: Skin is warm  Neurological:      General: No focal deficit present  Mental Status: She is alert and oriented to person, place, and time  Mental status is at baseline  Psychiatric:         Mood and Affect: Mood normal          Behavior: Behavior normal          Thought Content:  Thought content normal          Labs:  CBC:   Lab Results   Component Value Date    WBC 7 1 02/07/2022    RBC 4 68 02/07/2022    HGB 13 5 02/07/2022    HCT 41 5 02/07/2022    MCV 89 02/07/2022     (H) 02/07/2022    RDW 12 9 02/07/2022       CMP:   Lab Results   Component Value Date    NA 143 01/12/2018    K 4 7 02/07/2022     02/07/2022    CO2 21 02/07/2022    BUN 16 02/07/2022    CREATININE 0 69 02/07/2022    GLUCOSE 93 01/12/2018    CALCIUM 9 7 01/12/2018    AST 18 02/07/2022    ALT 21 02/07/2022    ALKPHOS 61 01/12/2018    PROT 6 8 01/12/2018    BILITOT 0 3 01/12/2018       Magnesium:  No results found for: MG    Lipid Profile:   Lab Results   Component Value Date    CHOL 254 (H) 01/12/2018    HDL 47 02/07/2022    TRIG 123 02/07/2022    LDLCALC 175 (H) 02/07/2022       Thyroid Studies: No results found for: XQP6PWZJTZHH, T3FREE, FREET4, G4ZWDOQ, U9YKXHV    A1c:  No components found for: HGA1C    INR:  No results found for: LEU7    Imaging: No results found  Cardiac testing:   No results found for this or any previous visit  No results found for this or any previous visit  No results found for this or any previous visit  No results found for this or any previous visit  EGD  Addendum: [facility] Operating Room   [address] [place]   [phone]     DATE OF SERVICE:   4/15/22     PHYSICIAN(S):   Attending:    Mimi Mason MD      Fellow:    No Staff Documented      INDICATION:   Morbid (severe) obesity due to excess calories ([address] )     POST-OP DIAGNOSIS:   See the impression below  PREPROCEDURE:   Informed consent was obtained for the procedure, including sedation  Risks of perforation, hemorrhage, adverse drug reaction and aspiration  were discussed  The patient was placed in the left lateral decubitus  position  Patient was explained about the risks and benefits of the procedure  Risks  including but not limited to bleeding, infection, and perforation were  explained in detail  Also explained about less than 100% sensitivity with  the exam and other alternatives  DETAILS OF PROCEDURE:   Patient was taken to the procedure room where a time out was performed to  confirm correct patient and correct procedure   The patient underwent  monitored anesthesia care, which was administered by an anesthesia  professional  The patient's blood pressure, heart rate, level of  consciousness, respirations and oxygen were monitored throughout the  procedure  The scope was advanced to the second part of the duodenum  Retroflexion was performed in the fundus  The patient experienced no blood  loss  The procedure was not difficult  The patient tolerated the procedure  well  There were no apparent complications  ANESTHESIA INFORMATION:   ASA: III   Anesthesia Type: IV Sedation with Anesthesia     MEDICATIONS:   No administrations occurring from 0805 to 0815 on 04/15/22      FINDINGS:   All observed locations appeared normal, including the esophagus, stomach,  duodenal bulb, 1st part of the duodenum and 2nd part of the duodenum  The  esophagus, stomach, duodenal bulb, 1st part of the duodenum and 2nd part  of the duodenum appeared normal    Performed multiple biopsies to rule out H  pylori in the antrum     SPECIMENS:   ID Type Source Tests Collected by Time Destination    1 : antrum r/o h  pylori Tissue Stomach TISSUE EXAM Keegan Aviles MD  4/15/2022 0813       IMPRESSION:   Normal EGD (images failed to upload)     RECOMMENDATION:   Continue progress             Keegan Aviles MD  Narrative: [address] [place] [address] Operating Room  [address] [place] [address]  [phone]    DATE OF SERVICE:  4/15/22    PHYSICIAN(S):  Attending:   Keegan Aviles MD     Fellow:   No Staff Documented     INDICATION:  Morbid (severe) obesity due to excess calories ([address] )    POST-OP DIAGNOSIS:  See the impression below  PREPROCEDURE:  Informed consent was obtained for the procedure, including sedation  Risks of perforation, hemorrhage, adverse drug reaction and aspiration   were discussed  The patient was placed in the left lateral decubitus   position  Patient was explained about the risks and benefits of the procedure   Risks including but not limited to bleeding, infection, and perforation were   explained in detail  Also explained about less than 100% sensitivity with   the exam and other alternatives  DETAILS OF PROCEDURE:  Patient was taken to the procedure room where a time out was performed to   confirm correct patient and correct procedure  The patient underwent   monitored anesthesia care, which was administered by an anesthesia   professional  The patient's blood pressure, heart rate, level of   consciousness, respirations and oxygen were monitored throughout the   procedure  The scope was advanced to the second part of the duodenum  Retroflexion was performed in the fundus  The patient experienced no blood   loss  The procedure was not difficult  The patient tolerated the procedure   well  There were no apparent complications  ANESTHESIA INFORMATION:  ASA: III  Anesthesia Type: IV Sedation with Anesthesia    MEDICATIONS:  No administrations occurring from 0805 to 0815 on 04/15/22     FINDINGS:  All observed locations appeared normal, including the esophagus, stomach,   duodenal bulb, 1st part of the duodenum and 2nd part of the duodenum   The   esophagus, stomach, duodenal bulb, 1st part of the duodenum and 2nd part   of the duodenum appeared normal   Performed multiple biopsies to rule out H  pylori in the antrum    SPECIMENS:  ID Type Source Tests Collected by Time Destination   1 : antrum r/o h  pylori Tissue Stomach TISSUE EXAM Angelina Sanchez MD   4/15/2022 0813    Impression: Normal EGD    RECOMMENDATION:  Continue progress          Angelina Sanchez MD

## 2022-06-17 ENCOUNTER — HOSPITAL ENCOUNTER (OUTPATIENT)
Dept: NON INVASIVE DIAGNOSTICS | Facility: HOSPITAL | Age: 57
Discharge: HOME | End: 2022-06-17
Payer: COMMERCIAL

## 2022-06-17 VITALS
HEIGHT: 64 IN | DIASTOLIC BLOOD PRESSURE: 78 MMHG | BODY MASS INDEX: 38.93 KG/M2 | HEART RATE: 67 BPM | SYSTOLIC BLOOD PRESSURE: 124 MMHG | WEIGHT: 228 LBS

## 2022-06-17 DIAGNOSIS — Z01.810 PRE-OPERATIVE CARDIOVASCULAR EXAMINATION: ICD-10-CM

## 2022-06-17 LAB
AORTIC ROOT: 3.6 CM
APICAL FOUR CHAMBER EJECTION FRACTION: 57 %
ASCENDING AORTA: 3.7 CM
E WAVE DECELERATION TIME: 287 MS
FRACTIONAL SHORTENING: 30 % (ref 28–44)
INTERVENTRICULAR SEPTUM IN DIASTOLE (PARASTERNAL SHORT AXIS VIEW): 1.2 CM
INTERVENTRICULAR SEPTUM: 1.2 CM (ref 0.6–1.1)
LAAS-AP2: 19.8 CM2
LAAS-AP4: 19.9 CM2
LEFT ATRIUM SIZE: 3.6 CM
LEFT INTERNAL DIMENSION IN SYSTOLE: 3.1 CM (ref 2.1–4)
LEFT VENTRICULAR INTERNAL DIMENSION IN DIASTOLE: 4.4 CM (ref 3.5–6)
LEFT VENTRICULAR POSTERIOR WALL IN END DIASTOLE: 1.2 CM
LEFT VENTRICULAR STROKE VOLUME: 50 ML
LVSV (TEICH): 50 ML
MV E'TISSUE VEL-SEP: 11 CM/S
MV PEAK A VEL: 0.71 M/S
MV PEAK E VEL: 71 CM/S
MV STENOSIS PRESSURE HALF TIME: 83 MS
MV VALVE AREA P 1/2 METHOD: 2.65 CM2
RIGHT ATRIUM AREA SYSTOLE A4C: 15.2 CM2
RIGHT VENTRICLE ID DIMENSION: 3.6 CM
SL CV LEFT ATRIUM LENGTH A2C: 5.5 CM
SL CV LV EF: 60
SL CV PED ECHO LEFT VENTRICLE DIASTOLIC VOLUME (MOD BIPLANE) 2D: 89 ML
SL CV PED ECHO LEFT VENTRICLE SYSTOLIC VOLUME (MOD BIPLANE) 2D: 39 ML

## 2022-06-17 PROCEDURE — 93306 TTE W/DOPPLER COMPLETE: CPT | Performed by: INTERNAL MEDICINE

## 2022-06-17 PROCEDURE — 93306 TTE W/DOPPLER COMPLETE: CPT

## 2022-06-21 ENCOUNTER — TELEPHONE (OUTPATIENT)
Dept: CARDIOLOGY CLINIC | Facility: CLINIC | Age: 57
End: 2022-06-21

## 2022-06-21 NOTE — TELEPHONE ENCOUNTER
----- Message from Hector Baxter MD sent at 6/21/2022 12:55 PM EDT -----  Your heart ultrasound was within normal limits  You are okay to proceed with planned surgery    Letter being sent for same

## 2022-06-21 NOTE — TELEPHONE ENCOUNTER
Called pt left message that heart ultrasound was normal  Left call back number to [facility] at [facility] [place]

## 2022-06-24 ENCOUNTER — OFFICE VISIT (OUTPATIENT)
Dept: BARIATRICS | Facility: CLINIC | Age: 57
End: 2022-06-24

## 2022-06-24 VITALS — WEIGHT: 228.4 LBS | BODY MASS INDEX: 39.2 KG/M2

## 2022-06-24 DIAGNOSIS — E66.9 OBESITY, CLASS II, BMI 35-39.9: Primary | ICD-10-CM

## 2022-06-24 PROCEDURE — RECHECK

## 2022-06-24 NOTE — PROGRESS NOTES
Patient presents for 3 of 3 weight check, current weight 228 4lbs  Eating behaviors/food choices: Patient feels she is stuck at her current weight, she recognizes that she goes for volume and said she is consuming about 2200 calories per day  Discussed using vegetables as a way to bulk up meals for lower calorie counts, discussed ways to sneak vegetables into meals which she plans to work on  Her  has taken all of his snacks and stored them in his room so that temptation has been greatly reduced  Activity/Exercise:  Patient admits she's not very active when she works remotely, she is sedentary at home but when she goes into work she is walking a lot, getting about 8k steps those days  Discussed ways to sneak in activity throughout the day, she does have a standing desk that she will try to use that more frequently and to stretch more  She experiences leg numbness and pain when standing for too long, educated on the importance of gentle stretch and to try chair stretches/yoga  Mental Health/Wellness:  Patient is investing time in her own self care, gathering information about surgery from reputable sources and asking questions so she is most prepared for surgery  She has friends who have had the surgery who emphasize the importance of making change pre-op, patient is wanting to follow through with this  She joined a [employer] not associated with this program and is questioning the information that is being shared there  She is aligned with joining the [facility] to get information and guidance from staff and patients      Workflow review:    Labs and PCP: TSH labs done and need to be reviewed by RD, PCP letter done  Psych and EGD: no eval needed, EGD done 4/15  Nicotine: na  Support Group: done   Cardiology: scheduled 6/13  Sleep: NA  Weight and Weight Checks: 3 + 1 weight checks, goal weight is 222lbs    Goals:    - continue to log foods, be mindful of portions and have more vegetables if volume is needed for satiation  - incorporate some activity throughout the day with the use of standing desk, stretching and chair yoga   - continue to gather information and ask questions to prepare for post bariatric life      Next Appointment:  +1 appointment with surgeon on 7/29

## 2022-07-29 ENCOUNTER — OFFICE VISIT (OUTPATIENT)
Dept: BARIATRICS | Facility: CLINIC | Age: 57
End: 2022-07-29
Payer: COMMERCIAL

## 2022-07-29 VITALS
HEIGHT: 64 IN | HEART RATE: 79 BPM | WEIGHT: 226.2 LBS | BODY MASS INDEX: 38.62 KG/M2 | SYSTOLIC BLOOD PRESSURE: 136 MMHG | DIASTOLIC BLOOD PRESSURE: 88 MMHG

## 2022-07-29 DIAGNOSIS — E78.2 MIXED HYPERLIPIDEMIA: ICD-10-CM

## 2022-07-29 DIAGNOSIS — Z01.818 ENCOUNTER FOR OTHER PREPROCEDURAL EXAMINATION: Primary | ICD-10-CM

## 2022-07-29 DIAGNOSIS — E66.9 OBESITY, CLASS II, BMI 35-39.9: ICD-10-CM

## 2022-07-29 DIAGNOSIS — Z12.31 ENCOUNTER FOR SCREENING MAMMOGRAM FOR MALIGNANT NEOPLASM OF BREAST: ICD-10-CM

## 2022-07-29 DIAGNOSIS — R73.03 PREDIABETES: ICD-10-CM

## 2022-07-29 DIAGNOSIS — E66.01 MORBID (SEVERE) OBESITY DUE TO EXCESS CALORIES (HCC): ICD-10-CM

## 2022-07-29 DIAGNOSIS — I10 PRIMARY HYPERTENSION: ICD-10-CM

## 2022-07-29 DIAGNOSIS — E88.81 METABOLIC SYNDROME: ICD-10-CM

## 2022-07-29 PROCEDURE — 99213 OFFICE O/P EST LOW 20 MIN: CPT | Performed by: SURGERY

## 2022-07-29 NOTE — PROGRESS NOTES
Progress Note - Bariatric Surgery   Winifred Souza 56 y o  female MRN: [id number]  Unit/Bed#:  Encounter: [id number]    Assessment/Plan:  Pt is a 56year old female with morbid obesity with medical conditions caused and/or exacerbated by obesity including HTN, prediabetes, HLD and metabolic syndrome  After review and reassessment of the patient and chart, I still deem it medically necessary that she undergo metabolic and bariatric surgery  More specifically, laparoscopic willian-en-y gastric bypass  Pt will be on our program mandated 800 calorie liver shrinking, very low calorie preoperative diet for 2-4 weeks prior to surgery  Mindful eating, stress reduction, sleep hygiene   Exercise  MVI/minerals  F/u w/ RD next month        Subjective/Objective     Subjective: Patient is doing well  She continues to lose weight  She has joined a gym and is exercising more  She will start to practice     Review of systems: Negative except as noted above    Objective:     Blood pressure 136/88, pulse 79, height 5' 4" (1 626 m), weight 103 kg (226 lb 3 2 oz)  ,Body mass index is 38 83 kg/m²  Invasive Devices  Report    None                 Physical Exam:     No distress   EOMI   CN II-XII grossly intact  Neck ROM wnl   RRR  Breathing non labored   Abd flat, ND  Skin warm/dry  Msk ROM wnl   Thought content/behavior/mood wnl               Lab, Imaging and other studies:I have personally reviewed pertinent results

## 2022-08-02 DIAGNOSIS — I10 PRIMARY HYPERTENSION: ICD-10-CM

## 2022-08-02 RX ORDER — LISINOPRIL 5 MG/1
5 TABLET ORAL DAILY
Qty: 90 TABLET | Refills: 1 | Status: SHIPPED | OUTPATIENT
Start: 2022-08-02

## 2022-08-10 ENCOUNTER — TELEPHONE (OUTPATIENT)
Dept: BARIATRICS | Facility: CLINIC | Age: 57
End: 2022-08-10

## 2022-08-10 NOTE — TELEPHONE ENCOUNTER
Left patient a voicemail to discuss moving her RD appointment up sooner  Her insurance is requiring another RD visit, and potentially another surgeon/PA visit for her bariatric process  Asked her to call back to see if we can move her 8/26 visit up sooner so I can submit that to the insurance before it's determined if she needs another surgeon/ PA visit

## 2022-08-12 ENCOUNTER — OFFICE VISIT (OUTPATIENT)
Dept: BARIATRICS | Facility: CLINIC | Age: 57
End: 2022-08-12

## 2022-08-12 VITALS — BODY MASS INDEX: 38.58 KG/M2 | HEIGHT: 64 IN | WEIGHT: 226 LBS

## 2022-08-12 DIAGNOSIS — E66.9 OBESITY, CLASS II, BMI 35-39.9: Primary | ICD-10-CM

## 2022-08-12 PROCEDURE — RECHECK

## 2022-08-12 NOTE — PROGRESS NOTES
Bariatric Nutrition Virtual F/U Note      i  Name was verified by patient stating name? yes  ii   verified by patient stating? yes  iii  Verification of patient location yes  iv  Verified patient is in state in which I hold an active license? yes  v  Verified the patient is alone? yes  vi  I would like to verify that you were offered a live visit but are now consenting to this telephone visit? yes  vii  This visit is free yes      Type of surgery    Preop--today pre-op nutrition visit to meet insurance requirements of weight check 3 +1  Surgery Date: TBD  Surgeon: Dr Salinas Garcia (consult on 22)    Nutrition Assessment   Winifred Smith  56 y o   female     Wt with BMI of 25: 146#  Pre-Op Excess Wt: 88 4#  Height 5' 4" (1 626 m), weight 103 kg (226 lb)  Body mass index is 38 79 kg/m²  Body mass index is 38 79 kg/m²  Net weight change:  -8 4      Weight History   Onset of Obesity: Adult--in HS was 125lbs, in 20-30's was average 135lbs, started putting on weight at the end of her 30's up to about 160lbs, in her 42's went from 165-200 quickly, then quit smoking [id number] and gained another 15lbs  Family history of obesity: Yes  Wt Loss Attempts: Exercise  High Protein/Low CHO diets (Atkins, Union, etc )  Self Created Diets (Portion Control, Healthy Food Choices, etc )  Maximum Wt Lost: 20-30lbs  Highest wt was 250 about 3 years ago, started calorie counting and lost and now maintains between 230-235#--consumes about 2400 cals/day    Review of History and Medications   PMH per pt:    H/o of blood clot in eye  Recent dx of HTN--since eval now on BP med and chol med  Pre-dm    Past Medical History:   Diagnosis Date    Abnormal Pap smear of cervix     Colon polyp     Depression     Hyperlipidemia     Hypertension     Obesity (BMI 30-39  9)      Past Surgical History:   Procedure Laterality Date    COLONOSCOPY      DENTAL IMPLANT      DILATION AND CURETTAGE OF UTERUS      EGD  04/15/2022    [place] COLONOSCOPY FLX DX W/COLLJ SPEC WHEN PFRMD N/A 3/12/2018    Procedure: COLONOSCOPY;  Surgeon: Jorge Alberto Tam MD;  [facility]; Service: Gastroenterology     Social History     Socioeconomic History    Marital status: /Civil Union     Spouse name: Not on file    Number of children: Not on file    Years of education: Not on file    Highest education level: Not on file   Occupational History    Not on file   Tobacco Use    Smoking status: Former Smoker     Quit date: 2015     Years since quittin 1    Smokeless tobacco: Never Used    Tobacco comment: quit 2015   Vaping Use    Vaping Use: Former    Quit date: 2019    Substances: Nicotine, Flavoring   Substance and Sexual Activity    Alcohol use: Yes     Comment: Occs     Drug use: No    Sexual activity: Not on file   Other Topics Concern    Not on file   Social History Narrative    Not on file     Social Determinants of Health     Financial Resource Strain: Not on file   Food Insecurity: Not on file   Transportation Needs: Not on file   Physical Activity: Not on file   Stress: Not on file   Social Connections: Not on file   Intimate Partner Violence: Not on file   Housing Stability: Not on file       Current Outpatient Medications:     lisinopril (ZESTRIL) 5 mg tablet, TAKE 1 TABLET (5 MG TOTAL) BY MOUTH DAILY  , Disp: 90 tablet, Rfl: 1    rosuvastatin (CRESTOR) 5 mg tablet, Take 1 tablet (5 mg total) by mouth daily (Patient taking differently: Take 5 mg by mouth daily at bedtime), Disp: 90 tablet, Rfl: 3     Food Intake and Lifestyle Assessment   Food Intake Assessment completed via usual diet recall  · Keeping a food log  She noted last year she was consuming about 2500 cals on average, but now down to 2200 cals  · Aiming for protein, limitng carbs  · Using the calcium chewys as a sweet treat  · Less alcohol  · Was up to 231lbs when got home from vacation, now back down 5lbs      Some days goes into the office for work and some days works home  Breakfast: 10am-has been having the "[employer]stem breakfast sandwich (ww english muffin w/egg white, turkey sausage for 210 cals) OR Ensure Max protein shake  Snack: not often  Lunch: 1:30-2pm:  More recently has been doing roasted turkey on flatbread w/pretzels  Snack: 4pm-1 cup cheerios dry to snack on or frosted shredded wheat  Long commute--works for [employer] in [facility] so can be 1 5-2hr commute  [address] [facility] 6[facility]:her  does a lot of cooking and he cooks well  She feels she eats larger portions  Working on measuring--did purchase a food scale  Snack: trying to cut down--has put the sweet treats in her 's room to avoid temptation with it in the kitchen  Beverage intake: water, sugar free beverages and coffee/tea  Protein supplement: Ensure, but original   Estimated protein intake per day: 70-80gm  Estimated fluid intake per day: 2 cups of black coffee, 8oz diet cran, 64oz water per day  Meals eaten away from home: less often  Typical meal pattern: 3 meals per day and 2-3 snacks per day  Eating Behaviors: Consumption of high calorie/ high fat foods, Large portion sizes, Frequent snacking/ grazing, Mindless eating, Craves sweet foods and Craves salty foods    Food allergies or intolerances: Allergies   Allergen Reactions    Shrimp Flavor - Food Allergy Itching     Cultural or Anglican considerations: none    Physical Assessment  Physical Activity  Types of exercise: now joined the gym and getting her heart up up some--30 mins of cardio for now (for the past month)  Going 3-4 times per week when works from home on her lunch break       Current physical limitations: Joint pain and plantar facetious    Psychosocial Assessment   Support systems: significant other friend(s) relative(s)  Socioeconomic factors: none  Lives with   He does a lot of the cooking (Feels gained a lot of weight since  15yrs ago)  They both do the shopping    Nutrition Diagnosis  Diagnosis: Overweight / Obesity (NC-3 3)  Related to: Physical inactivity and Excessive energy intake  As Evidenced by: BMI >25     Nutrition Prescription: Recommend the following diet  Regular    Interventions and Teaching   Discussed pre-op and post-op nutrition guidelines  Patient educated and handouts provided  Surgical changes to stomach / GI  Capacity of post-surgery stomach  Diet progression  Adequate hydration  Expected weight loss  Weight loss plateaus/ possibility of weight regain  Exercise  Suggestions for pre-op diet  Nutrition considerations after surgery  Protein supplements  Meal planning and preparation  Appropriate carbohydrate, protein, and fat intake, and food/fluid choices to maximize safe weight loss, nutrient intake, and tolerance   Dietary and lifestyle changes  Possible problems with poor eating habits  Intuitive eating  Techniques for self monitoring and keeping daily food journal  Vitamin / Mineral supplementation post-op vitamins reviewed today    Education provided to: patient    Barriers to learning: No barriers identified  Readiness to change: preparation    Prior research on procedure: books, internet and friends or family    Comprehension: verbalizes understanding     Expected Compliance: good    Recommendations  Pt is an appropriate candidate for surgery  Yes    Evaluation / Monitoring  Dietitian to Monitor: Eating pattern as discussed Tolerance of nutrition prescription Body weight Lab values Physical activity    Pre-op wt loss:  Do not gain weight  Lose ~5% by DOS w/lifestylae changes and pre-op diet:  -12lbs (222#)  Can go to a BMI of 65=519# at 64"    Pt presents today with continued weight loss with a net weight loss of 8 4lbs since starting the program  She has been making some changes and has cut out about 300-400 calories on average to assist in a slow and steady weight loss   She is focusing on lean protein, now exercising at the gym and is working on cutting out the "junk food" at night by getting it out of the kitchen  Today we reviewed the pre-op diet and post-op vitamins  Emailed pt information on both for her reference  Advised pt RD can provide samples next time she is in the office        Goals  · Food journal with her spreadsheet or try [employer]  · Exercise 30 minutes 5 times per week--walking  · Complete lesson plans 1-6  · Eat 3 meals per day with protein at each meal  · Aim for 8259-4681 cals per day  · Research post-op vitamins off of today's discussion and handout provided  · F/U 1 month    Time Spent:   30 mins

## 2022-08-26 ENCOUNTER — PREP FOR PROCEDURE (OUTPATIENT)
Dept: BARIATRICS | Facility: CLINIC | Age: 57
End: 2022-08-26

## 2022-08-26 DIAGNOSIS — E78.2 MIXED HYPERLIPIDEMIA: ICD-10-CM

## 2022-08-26 DIAGNOSIS — E66.01 MORBID OBESITY (HCC): Primary | ICD-10-CM

## 2022-08-26 DIAGNOSIS — I10 ESSENTIAL HYPERTENSION, MALIGNANT: ICD-10-CM

## 2022-08-30 ENCOUNTER — VBI (OUTPATIENT)
Dept: ADMINISTRATIVE | Facility: OTHER | Age: 57
End: 2022-08-30

## 2022-08-30 NOTE — TELEPHONE ENCOUNTER
08/30/22 4:14 PM     See documentation in the VB [employer] SmartForm   gaps    [facility]

## 2022-09-23 ENCOUNTER — CLINICAL SUPPORT (OUTPATIENT)
Dept: BARIATRICS | Facility: CLINIC | Age: 57
End: 2022-09-23

## 2022-09-23 DIAGNOSIS — E66.9 OBESITY, CLASS II, BMI 35-39.9: Primary | ICD-10-CM

## 2022-09-23 PROCEDURE — RECHECK

## 2022-10-07 ENCOUNTER — OFFICE VISIT (OUTPATIENT)
Dept: BARIATRICS | Facility: CLINIC | Age: 57
End: 2022-10-07
Payer: COMMERCIAL

## 2022-10-07 VITALS
BODY MASS INDEX: 38.17 KG/M2 | HEART RATE: 69 BPM | WEIGHT: 223.6 LBS | SYSTOLIC BLOOD PRESSURE: 128 MMHG | DIASTOLIC BLOOD PRESSURE: 84 MMHG | HEIGHT: 64 IN

## 2022-10-07 DIAGNOSIS — Z98.84 BARIATRIC SURGERY STATUS: ICD-10-CM

## 2022-10-07 DIAGNOSIS — E78.2 MIXED HYPERLIPIDEMIA: ICD-10-CM

## 2022-10-07 DIAGNOSIS — E66.01 MORBID (SEVERE) OBESITY DUE TO EXCESS CALORIES (HCC): ICD-10-CM

## 2022-10-07 DIAGNOSIS — E66.9 OBESITY, CLASS II, BMI 35-39.9: ICD-10-CM

## 2022-10-07 DIAGNOSIS — E66.01 MORBID (SEVERE) OBESITY DUE TO EXCESS CALORIES (HCC): Primary | ICD-10-CM

## 2022-10-07 DIAGNOSIS — R73.03 PREDIABETES: ICD-10-CM

## 2022-10-07 DIAGNOSIS — I10 PRIMARY HYPERTENSION: ICD-10-CM

## 2022-10-07 DIAGNOSIS — Z01.818 ENCOUNTER FOR OTHER PREPROCEDURAL EXAMINATION: Primary | ICD-10-CM

## 2022-10-07 DIAGNOSIS — E88.81 METABOLIC SYNDROME: ICD-10-CM

## 2022-10-07 PROCEDURE — 99213 OFFICE O/P EST LOW 20 MIN: CPT | Performed by: SURGERY

## 2022-10-07 RX ORDER — GABAPENTIN 100 MG/1
300 CAPSULE ORAL ONCE
Status: CANCELLED | OUTPATIENT
Start: 2022-10-07 | End: 2022-10-07

## 2022-10-07 RX ORDER — OXYCODONE HYDROCHLORIDE 5 MG/1
5 TABLET ORAL EVERY 4 HOURS PRN
Qty: 10 TABLET | Refills: 0 | Status: SHIPPED | OUTPATIENT
Start: 2022-10-07

## 2022-10-07 RX ORDER — PANTOPRAZOLE SODIUM 40 MG/1
40 TABLET, DELAYED RELEASE ORAL DAILY
Qty: 90 TABLET | Refills: 1 | Status: SHIPPED | OUTPATIENT
Start: 2022-10-07

## 2022-10-07 RX ORDER — ACETAMINOPHEN 325 MG/1
975 TABLET ORAL ONCE
Status: CANCELLED | OUTPATIENT
Start: 2022-10-07 | End: 2022-10-07

## 2022-10-07 RX ORDER — CELECOXIB 200 MG/1
200 CAPSULE ORAL ONCE
Status: CANCELLED | OUTPATIENT
Start: 2022-10-07 | End: 2022-10-07

## 2022-10-07 RX ORDER — SCOLOPAMINE TRANSDERMAL SYSTEM 1 MG/1
1 PATCH, EXTENDED RELEASE TRANSDERMAL ONCE
Status: CANCELLED | OUTPATIENT
Start: 2022-10-07 | End: 2022-10-07

## 2022-10-07 RX ORDER — HEPARIN SODIUM 5000 [USP'U]/ML
5000 INJECTION, SOLUTION INTRAVENOUS; SUBCUTANEOUS
Status: CANCELLED | OUTPATIENT
Start: 2022-10-07 | End: 2022-10-08

## 2022-10-07 NOTE — PROGRESS NOTES
H&P Exam - Bariatric Surgery   Winifred Shultz 56 y o  female MRN: [id number]  Unit/Bed#:  Encounter: [id number]      HPI:    56 y o  yo morbidly obese female found to be a good candidate to undergo a weight loss operation upon being enrolled here at the [facility]   She has been pre certified to undergo a Laparoscopic gastric bypass  Here today to review her pre op test results  Has been medically cleared for the procedure  Associated Conditions: glucose intolerance, hyperlipidemia, elevated LDL and Stress incontinence  Disease Complications: hypertension and osteoarthritis    I have discussed with her at length the risks and benefits of the operation and reiterated the components of our multidisciplinary program and the importance of compliance and follow up in the post operative period  Although there is a great statistical chance of improvement or even resolution of most of her associated comorbidities, the results vary from patient to patient and they largely depend on his commitment  The patient was also instructed with regards to the importance of behavior modification, nutritional counseling, support meeting attendance and lifestyle changes that are important to ensure success  She was given the opportunity to ask questions and I have answered all of them  I have addressed with the patient the level of CODE STATUS for this hospital stay and after explaining the different options currently she wishes to be a Level I  She understands and wishes to proceed  She has lost all the weight required prior to surgery  Review of Systems   Genitourinary:        SI   Musculoskeletal: Positive for arthralgias and back pain  All other systems reviewed and are negative  Historical Information   Past Medical History:   Diagnosis Date    Abnormal Pap smear of cervix     Colon polyp     Depression     Hyperlipidemia     Hypertension     Obesity (BMI 30-39  9)      Past Surgical History:   Procedure Laterality Date    COLONOSCOPY      DENTAL IMPLANT      DILATION AND CURETTAGE OF UTERUS      EGD  04/15/2022    [place] COLONOSCOPY FLX DX W/COLLJ SPEC WHEN PFRMD N/A 3/12/2018    Procedure: COLONOSCOPY;  Surgeon: Quintin Perez MD;  Location[facility]; Service: Gastroenterology     Social History   Social History     Substance and Sexual Activity   Alcohol Use Yes    Comment: Occs      Social History     Substance and Sexual Activity   Drug Use No     Social History     Tobacco Use   Smoking Status Former Smoker    Quit date: 2015    Years since quittin 2   Smokeless Tobacco Never Used   Tobacco Comment    quit 2015     Family History: non-contributory    Meds/Allergies   all medications and allergies reviewed  Allergies   Allergen Reactions    Shrimp Flavor - Food Allergy Itching       Objective     Current Vitals:   Blood Pressure: 128/84 (10/07/22 1026)  Pulse: 69 (10/07/22 1026)  Height: 5' 4" (162 6 cm) (10/07/22 1026)  Weight - Scale: 101 kg (223 lb 9 6 oz) (10/07/22 1026)      Invasive Devices  Report    None                 Physical Exam  Constitutional:       Appearance: Normal appearance  HENT:      Head: Atraumatic  Nose: No rhinorrhea  Eyes:      Extraocular Movements: Extraocular movements intact  Cardiovascular:      Rate and Rhythm: Normal rate  Pulmonary:      Effort: Pulmonary effort is normal  No respiratory distress  Breath sounds: Normal breath sounds  Abdominal:      General: Abdomen is flat  There is no distension  Palpations: Abdomen is soft  Tenderness: There is no abdominal tenderness  Musculoskeletal:         General: Normal range of motion  Cervical back: Normal range of motion  Skin:     General: Skin is warm and dry  Neurological:      General: No focal deficit present  Mental Status: She is alert and oriented to person, place, and time     Psychiatric:         Mood and Affect: Mood normal          Behavior: Behavior normal          Lab Results: I have personally reviewed pertinent lab results  Imaging: I have personally reviewed pertinent reports  EKG, Pathology, and Other Studies: I have personally reviewed pertinent reports  Code Status: Level 1    Assessment:  56 y o  yo morbidly obese female found to be a good candidate to undergo a weight loss operation upon being enrolled here at the [facility]  She has completed all of the preoperative process for bariatric surgery      Plan:  - Plan for laparoscopic possible open willian-en-y gastric bypass with intraoperative EGD  - consent signed  - preop orders placed

## 2022-10-14 RX ORDER — ACETAMINOPHEN 500 MG
500 TABLET ORAL EVERY 6 HOURS PRN
Status: ON HOLD | COMMUNITY
End: 2022-10-25 | Stop reason: SDUPTHER

## 2022-10-14 RX ORDER — MULTIVITAMIN
1 TABLET ORAL DAILY
COMMUNITY

## 2022-10-14 NOTE — PRE-PROCEDURE INSTRUCTIONS
Pre-Surgery Instructions:   Medication Instructions   • acetaminophen (TYLENOL) 500 mg tablet Uses PRN- OK to take day of surgery   • lisinopril (ZESTRIL) 5 mg tablet Hold day of surgery  • Multiple Vitamin (multivitamin) tablet Stop taking 7 days prior to surgery  • rosuvastatin (CRESTOR) 5 mg tablet Take night before surgery   Covid screening negative as per patient  Fully vaccinated  Reviewed showering and medication instructions  Instructed to stop NSAIDS and non prescribed vitamins 7 days prior to DOS  Tylenol is OK to take  Take medications morning of surgery with a small sip of water  Patient verbalized understanding  Advised NPO after MN and ASC will call with scheduled surgical time  Surgeon's office provided 3 carb drinks  Patient verbalized when to consume them pre-operatively

## 2022-10-20 ENCOUNTER — TELEPHONE (OUTPATIENT)
Dept: BARIATRICS | Facility: CLINIC | Age: 57
End: 2022-10-20

## 2022-10-24 ENCOUNTER — RA CDI HCC (OUTPATIENT)
Dept: OTHER | Facility: HOSPITAL | Age: 57
End: 2022-10-24

## 2022-10-24 ENCOUNTER — ANESTHESIA (OUTPATIENT)
Dept: PERIOP | Facility: HOSPITAL | Age: 57
DRG: 621 | End: 2022-10-24
Payer: COMMERCIAL

## 2022-10-24 ENCOUNTER — ANESTHESIA EVENT (OUTPATIENT)
Dept: PERIOP | Facility: HOSPITAL | Age: 57
DRG: 621 | End: 2022-10-24
Payer: COMMERCIAL

## 2022-10-24 ENCOUNTER — HOSPITAL ENCOUNTER (INPATIENT)
Facility: HOSPITAL | Age: 57
LOS: 1 days | Discharge: HOME | DRG: 621 | End: 2022-10-25
Attending: SURGERY | Admitting: SURGERY
Payer: COMMERCIAL

## 2022-10-24 DIAGNOSIS — E66.9 OBESITY, CLASS II, BMI 35-39.9: ICD-10-CM

## 2022-10-24 DIAGNOSIS — I10 PRIMARY HYPERTENSION: Primary | ICD-10-CM

## 2022-10-24 LAB — GLUCOSE SERPL-MCNC: 176 MG/DL (ref 65–140)

## 2022-10-24 PROCEDURE — 94762 N-INVAS EAR/PLS OXIMTRY CONT: CPT

## 2022-10-24 PROCEDURE — C9290 INJ, BUPIVACAINE LIPOSOME: HCPCS | Performed by: SURGERY

## 2022-10-24 PROCEDURE — 43644 LAP GASTRIC BYPASS/ROUX-EN-Y: CPT | Performed by: SURGERY

## 2022-10-24 PROCEDURE — 90471 IMMUNIZATION ADMIN: CPT | Performed by: SURGERY

## 2022-10-24 PROCEDURE — NC001 PR NO CHARGE: Performed by: SURGERY

## 2022-10-24 PROCEDURE — 82948 REAGENT STRIP/BLOOD GLUCOSE: CPT

## 2022-10-24 PROCEDURE — 43644 LAP GASTRIC BYPASS/ROUX-EN-Y: CPT | Performed by: PHYSICIAN ASSISTANT

## 2022-10-24 PROCEDURE — 99222 1ST HOSP IP/OBS MODERATE 55: CPT | Performed by: FAMILY MEDICINE

## 2022-10-24 PROCEDURE — 0DJ08ZZ INSPECTION OF UPPER INTESTINAL TRACT, VIA NATURAL OR ARTIFICIAL OPENING ENDOSCOPIC: ICD-10-PCS | Performed by: SURGERY

## 2022-10-24 PROCEDURE — 0D164ZA BYPASS STOMACH TO JEJUNUM, PERCUTANEOUS ENDOSCOPIC APPROACH: ICD-10-PCS | Performed by: SURGERY

## 2022-10-24 PROCEDURE — 90682 RIV4 VACC RECOMBINANT DNA IM: CPT | Performed by: SURGERY

## 2022-10-24 RX ORDER — PHENYLEPHRINE HYDROCHLORIDE 10 MG/ML
INJECTION INTRAVENOUS AS NEEDED
Status: DISCONTINUED | OUTPATIENT
Start: 2022-10-24 | End: 2022-10-24

## 2022-10-24 RX ORDER — LIDOCAINE HYDROCHLORIDE 20 MG/ML
INJECTION, SOLUTION EPIDURAL; INFILTRATION; INTRACAUDAL; PERINEURAL AS NEEDED
Status: DISCONTINUED | OUTPATIENT
Start: 2022-10-24 | End: 2022-10-24

## 2022-10-24 RX ORDER — CELECOXIB 200 MG/1
200 CAPSULE ORAL ONCE
Status: COMPLETED | OUTPATIENT
Start: 2022-10-24 | End: 2022-10-24

## 2022-10-24 RX ORDER — PROMETHAZINE HYDROCHLORIDE 25 MG/ML
12.5 INJECTION, SOLUTION INTRAMUSCULAR; INTRAVENOUS ONCE AS NEEDED
Status: COMPLETED | OUTPATIENT
Start: 2022-10-24 | End: 2022-10-24

## 2022-10-24 RX ORDER — CEFAZOLIN SODIUM 2 G/50ML
2000 SOLUTION INTRAVENOUS EVERY 8 HOURS
Status: COMPLETED | OUTPATIENT
Start: 2022-10-24 | End: 2022-10-25

## 2022-10-24 RX ORDER — SODIUM CHLORIDE, SODIUM LACTATE, POTASSIUM CHLORIDE, CALCIUM CHLORIDE 600; 310; 30; 20 MG/100ML; MG/100ML; MG/100ML; MG/100ML
100 INJECTION, SOLUTION INTRAVENOUS CONTINUOUS
Status: DISCONTINUED | OUTPATIENT
Start: 2022-10-24 | End: 2022-10-25 | Stop reason: HOSPADM

## 2022-10-24 RX ORDER — HYDROMORPHONE HCL/PF 1 MG/ML
0.5 SYRINGE (ML) INJECTION
Status: COMPLETED | OUTPATIENT
Start: 2022-10-24 | End: 2022-10-24

## 2022-10-24 RX ORDER — MAGNESIUM HYDROXIDE 1200 MG/15ML
LIQUID ORAL AS NEEDED
Status: DISCONTINUED | OUTPATIENT
Start: 2022-10-24 | End: 2022-10-24 | Stop reason: HOSPADM

## 2022-10-24 RX ORDER — ONDANSETRON 2 MG/ML
4 INJECTION INTRAMUSCULAR; INTRAVENOUS EVERY 6 HOURS PRN
Status: DISCONTINUED | OUTPATIENT
Start: 2022-10-24 | End: 2022-10-25 | Stop reason: HOSPADM

## 2022-10-24 RX ORDER — FENTANYL CITRATE/PF 50 MCG/ML
25 SYRINGE (ML) INJECTION
Status: DISCONTINUED | OUTPATIENT
Start: 2022-10-24 | End: 2022-10-24 | Stop reason: HOSPADM

## 2022-10-24 RX ORDER — PROPOFOL 10 MG/ML
INJECTION, EMULSION INTRAVENOUS AS NEEDED
Status: DISCONTINUED | OUTPATIENT
Start: 2022-10-24 | End: 2022-10-24

## 2022-10-24 RX ORDER — SIMETHICONE 80 MG
80 TABLET,CHEWABLE ORAL EVERY 12 HOURS SCHEDULED
Status: DISCONTINUED | OUTPATIENT
Start: 2022-10-24 | End: 2022-10-25 | Stop reason: HOSPADM

## 2022-10-24 RX ORDER — SODIUM CHLORIDE, SODIUM LACTATE, POTASSIUM CHLORIDE, CALCIUM CHLORIDE 600; 310; 30; 20 MG/100ML; MG/100ML; MG/100ML; MG/100ML
INJECTION, SOLUTION INTRAVENOUS CONTINUOUS PRN
Status: DISCONTINUED | OUTPATIENT
Start: 2022-10-24 | End: 2022-10-24

## 2022-10-24 RX ORDER — HEPARIN SODIUM 5000 [USP'U]/ML
5000 INJECTION, SOLUTION INTRAVENOUS; SUBCUTANEOUS
Status: COMPLETED | OUTPATIENT
Start: 2022-10-24 | End: 2022-10-24

## 2022-10-24 RX ORDER — LORAZEPAM 2 MG/ML
0.5 INJECTION INTRAMUSCULAR EVERY 6 HOURS PRN
Status: DISCONTINUED | OUTPATIENT
Start: 2022-10-24 | End: 2022-10-25 | Stop reason: HOSPADM

## 2022-10-24 RX ORDER — PROPOFOL 10 MG/ML
INJECTION, EMULSION INTRAVENOUS CONTINUOUS PRN
Status: DISCONTINUED | OUTPATIENT
Start: 2022-10-24 | End: 2022-10-24

## 2022-10-24 RX ORDER — SODIUM CHLORIDE 9 MG/ML
INJECTION, SOLUTION INTRAVENOUS CONTINUOUS PRN
Status: DISCONTINUED | OUTPATIENT
Start: 2022-10-24 | End: 2022-10-24

## 2022-10-24 RX ORDER — EPHEDRINE SULFATE 50 MG/ML
INJECTION INTRAVENOUS AS NEEDED
Status: DISCONTINUED | OUTPATIENT
Start: 2022-10-24 | End: 2022-10-24

## 2022-10-24 RX ORDER — KETAMINE HCL IN NACL, ISO-OSM 100MG/10ML
SYRINGE (ML) INJECTION AS NEEDED
Status: DISCONTINUED | OUTPATIENT
Start: 2022-10-24 | End: 2022-10-24

## 2022-10-24 RX ORDER — METRONIDAZOLE 500 MG/100ML
500 INJECTION, SOLUTION INTRAVENOUS EVERY 8 HOURS SCHEDULED
Status: COMPLETED | OUTPATIENT
Start: 2022-10-24 | End: 2022-10-25

## 2022-10-24 RX ORDER — MIDAZOLAM HYDROCHLORIDE 2 MG/2ML
INJECTION, SOLUTION INTRAMUSCULAR; INTRAVENOUS AS NEEDED
Status: DISCONTINUED | OUTPATIENT
Start: 2022-10-24 | End: 2022-10-24

## 2022-10-24 RX ORDER — DEXMEDETOMIDINE HYDROCHLORIDE 100 UG/ML
INJECTION, SOLUTION INTRAVENOUS AS NEEDED
Status: DISCONTINUED | OUTPATIENT
Start: 2022-10-24 | End: 2022-10-24

## 2022-10-24 RX ORDER — HYDROMORPHONE HCL/PF 1 MG/ML
1 SYRINGE (ML) INJECTION EVERY 4 HOURS PRN
Status: DISCONTINUED | OUTPATIENT
Start: 2022-10-24 | End: 2022-10-25 | Stop reason: HOSPADM

## 2022-10-24 RX ORDER — OXYCODONE HCL 5 MG/5 ML
10 SOLUTION, ORAL ORAL EVERY 4 HOURS PRN
Status: DISCONTINUED | OUTPATIENT
Start: 2022-10-24 | End: 2022-10-25 | Stop reason: HOSPADM

## 2022-10-24 RX ORDER — KETOROLAC TROMETHAMINE 30 MG/ML
15 INJECTION, SOLUTION INTRAMUSCULAR; INTRAVENOUS EVERY 6 HOURS SCHEDULED
Status: DISCONTINUED | OUTPATIENT
Start: 2022-10-24 | End: 2022-10-25 | Stop reason: HOSPADM

## 2022-10-24 RX ORDER — DIPHENHYDRAMINE HCL 25 MG
25 TABLET ORAL
Status: DISCONTINUED | OUTPATIENT
Start: 2022-10-24 | End: 2022-10-25 | Stop reason: HOSPADM

## 2022-10-24 RX ORDER — DEXAMETHASONE SODIUM PHOSPHATE 10 MG/ML
INJECTION, SOLUTION INTRAMUSCULAR; INTRAVENOUS AS NEEDED
Status: DISCONTINUED | OUTPATIENT
Start: 2022-10-24 | End: 2022-10-24

## 2022-10-24 RX ORDER — LIDOCAINE HYDROCHLORIDE 10 MG/ML
INJECTION, SOLUTION EPIDURAL; INFILTRATION; INTRACAUDAL; PERINEURAL AS NEEDED
Status: DISCONTINUED | OUTPATIENT
Start: 2022-10-24 | End: 2022-10-24

## 2022-10-24 RX ORDER — GABAPENTIN 300 MG/1
300 CAPSULE ORAL ONCE
Status: COMPLETED | OUTPATIENT
Start: 2022-10-24 | End: 2022-10-24

## 2022-10-24 RX ORDER — ONDANSETRON 2 MG/ML
INJECTION INTRAMUSCULAR; INTRAVENOUS AS NEEDED
Status: DISCONTINUED | OUTPATIENT
Start: 2022-10-24 | End: 2022-10-24

## 2022-10-24 RX ORDER — PROMETHAZINE HYDROCHLORIDE 25 MG/ML
25 INJECTION, SOLUTION INTRAMUSCULAR; INTRAVENOUS EVERY 6 HOURS PRN
Status: DISCONTINUED | OUTPATIENT
Start: 2022-10-24 | End: 2022-10-25 | Stop reason: HOSPADM

## 2022-10-24 RX ORDER — LISINOPRIL 5 MG/1
5 TABLET ORAL DAILY
Status: DISCONTINUED | OUTPATIENT
Start: 2022-10-24 | End: 2022-10-25 | Stop reason: HOSPADM

## 2022-10-24 RX ORDER — FENTANYL CITRATE 50 UG/ML
INJECTION, SOLUTION INTRAMUSCULAR; INTRAVENOUS AS NEEDED
Status: DISCONTINUED | OUTPATIENT
Start: 2022-10-24 | End: 2022-10-24

## 2022-10-24 RX ORDER — METRONIDAZOLE 500 MG/100ML
500 INJECTION, SOLUTION INTRAVENOUS ONCE
Status: COMPLETED | OUTPATIENT
Start: 2022-10-24 | End: 2022-10-24

## 2022-10-24 RX ORDER — CEFAZOLIN SODIUM 2 G/50ML
2000 SOLUTION INTRAVENOUS ONCE
Status: COMPLETED | OUTPATIENT
Start: 2022-10-24 | End: 2022-10-24

## 2022-10-24 RX ORDER — METOCLOPRAMIDE HYDROCHLORIDE 5 MG/ML
10 INJECTION INTRAMUSCULAR; INTRAVENOUS EVERY 6 HOURS PRN
Status: DISCONTINUED | OUTPATIENT
Start: 2022-10-24 | End: 2022-10-25 | Stop reason: HOSPADM

## 2022-10-24 RX ORDER — SCOLOPAMINE TRANSDERMAL SYSTEM 1 MG/1
1 PATCH, EXTENDED RELEASE TRANSDERMAL ONCE
Status: DISCONTINUED | OUTPATIENT
Start: 2022-10-24 | End: 2022-10-25 | Stop reason: HOSPADM

## 2022-10-24 RX ORDER — ACETAMINOPHEN 325 MG/1
975 TABLET ORAL EVERY 6 HOURS SCHEDULED
Status: DISCONTINUED | OUTPATIENT
Start: 2022-10-24 | End: 2022-10-25 | Stop reason: HOSPADM

## 2022-10-24 RX ORDER — ACETAMINOPHEN 325 MG/1
975 TABLET ORAL ONCE
Status: COMPLETED | OUTPATIENT
Start: 2022-10-24 | End: 2022-10-24

## 2022-10-24 RX ORDER — DIPHENHYDRAMINE HYDROCHLORIDE 50 MG/ML
12.5 INJECTION INTRAMUSCULAR; INTRAVENOUS ONCE AS NEEDED
Status: DISCONTINUED | OUTPATIENT
Start: 2022-10-24 | End: 2022-10-24 | Stop reason: HOSPADM

## 2022-10-24 RX ORDER — BUPIVACAINE HYDROCHLORIDE 5 MG/ML
INJECTION, SOLUTION EPIDURAL; INTRACAUDAL AS NEEDED
Status: DISCONTINUED | OUTPATIENT
Start: 2022-10-24 | End: 2022-10-24 | Stop reason: HOSPADM

## 2022-10-24 RX ORDER — OXYCODONE HCL 5 MG/5 ML
5 SOLUTION, ORAL ORAL EVERY 4 HOURS PRN
Status: DISCONTINUED | OUTPATIENT
Start: 2022-10-24 | End: 2022-10-25 | Stop reason: HOSPADM

## 2022-10-24 RX ORDER — ROCURONIUM BROMIDE 10 MG/ML
INJECTION, SOLUTION INTRAVENOUS AS NEEDED
Status: DISCONTINUED | OUTPATIENT
Start: 2022-10-24 | End: 2022-10-24

## 2022-10-24 RX ORDER — SODIUM CHLORIDE 9 MG/ML
INJECTION INTRAVENOUS AS NEEDED
Status: DISCONTINUED | OUTPATIENT
Start: 2022-10-24 | End: 2022-10-24 | Stop reason: HOSPADM

## 2022-10-24 RX ORDER — FAMOTIDINE 10 MG/ML
20 INJECTION, SOLUTION INTRAVENOUS EVERY 12 HOURS SCHEDULED
Status: DISCONTINUED | OUTPATIENT
Start: 2022-10-24 | End: 2022-10-25 | Stop reason: HOSPADM

## 2022-10-24 RX ADMIN — LISINOPRIL 5 MG: 5 TABLET ORAL at 18:44

## 2022-10-24 RX ADMIN — LIDOCAINE HYDROCHLORIDE 100 MG: 20 INJECTION, SOLUTION EPIDURAL; INFILTRATION; INTRACAUDAL; PERINEURAL at 09:08

## 2022-10-24 RX ADMIN — SUGAMMADEX 200 MG: 100 INJECTION, SOLUTION INTRAVENOUS at 10:47

## 2022-10-24 RX ADMIN — CELECOXIB 200 MG: 200 CAPSULE ORAL at 07:48

## 2022-10-24 RX ADMIN — SIMETHICONE 80 MG: 80 TABLET, CHEWABLE ORAL at 15:18

## 2022-10-24 RX ADMIN — HEPARIN SODIUM 5000 UNITS: 5000 INJECTION INTRAVENOUS; SUBCUTANEOUS at 07:48

## 2022-10-24 RX ADMIN — DEXMEDETOMIDINE HCL 4 MCG: 100 INJECTION INTRAVENOUS at 11:00

## 2022-10-24 RX ADMIN — SODIUM CHLORIDE: 0.9 INJECTION, SOLUTION INTRAVENOUS at 09:13

## 2022-10-24 RX ADMIN — EPHEDRINE SULFATE 10 MG: 50 INJECTION, SOLUTION INTRAVENOUS at 09:36

## 2022-10-24 RX ADMIN — DEXMEDETOMIDINE HCL 4 MCG: 100 INJECTION INTRAVENOUS at 10:40

## 2022-10-24 RX ADMIN — CEFAZOLIN SODIUM 2000 MG: 2 SOLUTION INTRAVENOUS at 08:55

## 2022-10-24 RX ADMIN — ACETAMINOPHEN 975 MG: 325 TABLET, FILM COATED ORAL at 07:47

## 2022-10-24 RX ADMIN — PROPOFOL 150 MG: 10 INJECTION, EMULSION INTRAVENOUS at 09:08

## 2022-10-24 RX ADMIN — FENTANYL CITRATE 25 MCG: 50 INJECTION INTRAMUSCULAR; INTRAVENOUS at 11:06

## 2022-10-24 RX ADMIN — KETOROLAC TROMETHAMINE 15 MG: 30 INJECTION, SOLUTION INTRAMUSCULAR at 17:37

## 2022-10-24 RX ADMIN — FAMOTIDINE 20 MG: 10 INJECTION, SOLUTION INTRAVENOUS at 17:41

## 2022-10-24 RX ADMIN — ONDANSETRON 4 MG: 2 INJECTION INTRAMUSCULAR; INTRAVENOUS at 10:44

## 2022-10-24 RX ADMIN — FENTANYL CITRATE 25 MCG: 50 INJECTION INTRAMUSCULAR; INTRAVENOUS at 11:03

## 2022-10-24 RX ADMIN — SCOPALAMINE 1 PATCH: 1 PATCH, EXTENDED RELEASE TRANSDERMAL at 07:48

## 2022-10-24 RX ADMIN — ACETAMINOPHEN 975 MG: 325 TABLET, FILM COATED ORAL at 23:21

## 2022-10-24 RX ADMIN — SODIUM CHLORIDE, SODIUM LACTATE, POTASSIUM CHLORIDE, AND CALCIUM CHLORIDE: .6; .31; .03; .02 INJECTION, SOLUTION INTRAVENOUS at 10:24

## 2022-10-24 RX ADMIN — DEXAMETHASONE SODIUM PHOSPHATE 10 MG: 10 INJECTION, SOLUTION INTRAMUSCULAR; INTRAVENOUS at 09:31

## 2022-10-24 RX ADMIN — OXYCODONE HYDROCHLORIDE 5 MG: 5 SOLUTION ORAL at 22:38

## 2022-10-24 RX ADMIN — PROMETHAZINE HYDROCHLORIDE 12.5 MG: 25 INJECTION INTRAMUSCULAR; INTRAVENOUS at 11:25

## 2022-10-24 RX ADMIN — METRONIDAZOLE: 500 INJECTION, SOLUTION INTRAVENOUS at 09:11

## 2022-10-24 RX ADMIN — ROCURONIUM BROMIDE 50 MG: 10 INJECTION, SOLUTION INTRAVENOUS at 09:08

## 2022-10-24 RX ADMIN — KETOROLAC TROMETHAMINE 15 MG: 30 INJECTION, SOLUTION INTRAMUSCULAR at 23:21

## 2022-10-24 RX ADMIN — OXYCODONE HYDROCHLORIDE 5 MG: 5 SOLUTION ORAL at 18:43

## 2022-10-24 RX ADMIN — ACETAMINOPHEN 975 MG: 325 TABLET, FILM COATED ORAL at 18:47

## 2022-10-24 RX ADMIN — ROCURONIUM BROMIDE 25 MG: 10 INJECTION, SOLUTION INTRAVENOUS at 10:05

## 2022-10-24 RX ADMIN — HYDROMORPHONE HYDROCHLORIDE 0.5 MG: 1 INJECTION, SOLUTION INTRAMUSCULAR; INTRAVENOUS; SUBCUTANEOUS at 11:46

## 2022-10-24 RX ADMIN — MIDAZOLAM 2 MG: 1 INJECTION INTRAMUSCULAR; INTRAVENOUS at 08:56

## 2022-10-24 RX ADMIN — PROPOFOL 120 MCG/KG/MIN: 10 INJECTION, EMULSION INTRAVENOUS at 09:10

## 2022-10-24 RX ADMIN — METRONIDAZOLE 500 MG: 500 INJECTION, SOLUTION INTRAVENOUS at 17:42

## 2022-10-24 RX ADMIN — SODIUM CHLORIDE, SODIUM LACTATE, POTASSIUM CHLORIDE, AND CALCIUM CHLORIDE: .6; .31; .03; .02 INJECTION, SOLUTION INTRAVENOUS at 08:55

## 2022-10-24 RX ADMIN — HYDROMORPHONE HYDROCHLORIDE 0.5 MG: 1 INJECTION, SOLUTION INTRAMUSCULAR; INTRAVENOUS; SUBCUTANEOUS at 11:11

## 2022-10-24 RX ADMIN — SIMETHICONE 80 MG: 80 TABLET, CHEWABLE ORAL at 21:23

## 2022-10-24 RX ADMIN — SUGAMMADEX 200 MG: 100 INJECTION, SOLUTION INTRAVENOUS at 10:45

## 2022-10-24 RX ADMIN — HYDROMORPHONE HYDROCHLORIDE 0.5 MG: 1 INJECTION, SOLUTION INTRAMUSCULAR; INTRAVENOUS; SUBCUTANEOUS at 11:21

## 2022-10-24 RX ADMIN — SODIUM CHLORIDE, SODIUM LACTATE, POTASSIUM CHLORIDE, AND CALCIUM CHLORIDE 100 ML/HR: .6; .31; .03; .02 INJECTION, SOLUTION INTRAVENOUS at 12:42

## 2022-10-24 RX ADMIN — INFLUENZA A VIRUS A/WISCONSIN/588/2019 (H1N1) RECOMBINANT HEMAGGLUTININ ANTIGEN, INFLUENZA A VIRUS A/DARWIN/6/2021 (H3N2) RECOMBINANT HEMAGGLUTININ ANTIGEN, INFLUENZA B VIRUS B/AUSTRIA/1359417/2021 RECOMBINANT HEMAGGLUTININ ANTIGEN, AND INFLUENZA B VIRUS B/PHUKET/3073/2013 RECOMBINANT HEMAGGLUTININ ANTIGEN 0.5 ML: 45; 45; 45; 45 INJECTION INTRAMUSCULAR at 21:23

## 2022-10-24 RX ADMIN — EPHEDRINE SULFATE 5 MG: 50 INJECTION, SOLUTION INTRAVENOUS at 09:32

## 2022-10-24 RX ADMIN — Medication 10 MG: at 10:00

## 2022-10-24 RX ADMIN — DEXMEDETOMIDINE HCL 4 MCG: 100 INJECTION INTRAVENOUS at 10:55

## 2022-10-24 RX ADMIN — FENTANYL CITRATE 50 MCG: 50 INJECTION INTRAMUSCULAR; INTRAVENOUS at 09:08

## 2022-10-24 RX ADMIN — GABAPENTIN 300 MG: 300 CAPSULE ORAL at 07:47

## 2022-10-24 RX ADMIN — HYDROMORPHONE HYDROCHLORIDE 0.5 MG: 1 INJECTION, SOLUTION INTRAMUSCULAR; INTRAVENOUS; SUBCUTANEOUS at 11:53

## 2022-10-24 RX ADMIN — DEXMEDETOMIDINE HCL 4 MCG: 100 INJECTION INTRAVENOUS at 09:05

## 2022-10-24 RX ADMIN — HYDROMORPHONE HYDROCHLORIDE 1 MG: 1 INJECTION, SOLUTION INTRAMUSCULAR; INTRAVENOUS; SUBCUTANEOUS at 12:33

## 2022-10-24 RX ADMIN — Medication 10 MG: at 09:16

## 2022-10-24 RX ADMIN — CEFAZOLIN SODIUM 2000 MG: 2 SOLUTION INTRAVENOUS at 18:43

## 2022-10-24 NOTE — ANESTHESIA PREPROCEDURE EVALUATION
Procedure:  LAPAROSCOPIC EMILY-EN-Y GASTRIC BYPASS AND INTRAOPERATIVE EGD (N/A Abdomen)    Relevant Problems   CARDIO   (+) Mixed hyperlipidemia   (+) Primary hypertension      MUSCULOSKELETAL   (+) Rotator cuff impingement syndrome of right shoulder      NEURO/PSYCH   (+) Chronic left shoulder pain      Other   (+) Obesity, Class II, BMI 35-39 9   (+) Prediabetes      Depression    Abnormal Pap smear of cervix    Hyperlipidemia    Obesity (BMI 30-39  9)    Colon polyp    Hypertension    Restless legs    Blood clot in eye    Pre-diabetes      •  Left Ventricle: Left ventricular cavity size is normal  Wall thickness is mildly increased  The left ventricular ejection fraction is 60%  Systolic function is normal  Wall motion is normal  Diastolic function is normal   •  Right Ventricle: Right ventricular cavity size is normal  Systolic function is normal      No significant valvular disease  Physical Exam    Airway    Mallampati score: II  TM Distance: >3 FB  Neck ROM: full     Dental       Cardiovascular  Cardiovascular exam normal    Pulmonary  Pulmonary exam normal     Other Findings        Anesthesia Plan  ASA Score- 2     Anesthesia Type- general with ASA Monitors  Additional Monitors:   Airway Plan: ETT  Plan Factors-Exercise tolerance (METS): >4 METS  Chart reviewed  EKG reviewed  Imaging results reviewed  Existing labs reviewed  Patient summary reviewed  Induction- intravenous  Postoperative Plan- Plan for postoperative opioid use  Planned trial extubation    Informed Consent- Anesthetic plan and risks discussed with patient  I personally reviewed this patient with the CRNA  Discussed and agreed on the Anesthesia Plan with the CRNA  Reji Snyder

## 2022-10-24 NOTE — PROGRESS NOTES
[facility]  coding opportunities       Chart reviewed, no opportunity found: CHART REVIEWED, NO OPPORTUNITY FOUND        Patients Insurance        Commercial Insurance: Jennifer Henry

## 2022-10-24 NOTE — H&P
Patient seen and examined by me  H&P updated  Original H&P on paper in chart      /69   Pulse 88   Temp 98 3 °F (36 8 °C) (Oral)   Resp (!) 10   Ht 5' 4" (1 626 m)   Wt 99 6 kg (219 lb 9 3 oz)   SpO2 93%   BMI 37 69 kg/m²       Constanza Montano MD  10/24/2022  8:40 AM

## 2022-10-24 NOTE — ANESTHESIA POSTPROCEDURE EVALUATION
Post-Op Assessment Note    CV Status:  Stable  Pain Score: 7    Pain management: inadequate (meds being given by PACU RN)  Multimodal analgesia used between 6 hours prior to anesthesia start to PACU discharge    Mental Status:  Alert and awake   Hydration Status:  Euvolemic   PONV Controlled:  Controlled   Airway Patency:  Patent      Post Op Vitals Reviewed: Yes      Staff: CRNA         No complications documented      /81 (10/24/22 1101)    Temp 97 6 °F (36 4 °C) (10/24/22 1101)    Pulse 80 (10/24/22 1101)   Resp 16 (10/24/22 1101)    SpO2 95 % (10/24/22 1101)

## 2022-10-24 NOTE — ASSESSMENT & PLAN NOTE
· History of morbid obesity status post laparoscopic Ailin-en-Y on 10/24/2022  Postoperative day 0  · Currently on p r n   Tylenol/Toradol/oxycodone/Dilaudid for pain control  · On clear liquid diet  · Plan of care per bariatric surgery team

## 2022-10-24 NOTE — PLAN OF CARE
Problem: PAIN - ADULT  Goal: Verbalizes/displays adequate comfort level or baseline comfort level  Description: Interventions:  - Encourage patient to monitor pain and request assistance  - Assess pain using appropriate pain scale  - Administer analgesics based on type and severity of pain and evaluate response  - Implement non-pharmacological measures as appropriate and evaluate response  - Consider cultural and social influences on pain and pain management  - Notify physician/advanced practitioner if interventions unsuccessful or patient reports new pain  Outcome: Progressing     Problem: INFECTION - ADULT  Goal: Absence or prevention of progression during hospitalization  Description: INTERVENTIONS:  - Assess and monitor for signs and symptoms of infection  - Monitor lab/diagnostic results  - Monitor all insertion sites, i e  indwelling lines, tubes, and drains  - Monitor endotracheal if appropriate and nasal secretions for changes in amount and color  - [place] appropriate cooling/warming therapies per order  - Administer medications as ordered  - Instruct and encourage patient and family to use good hand hygiene technique  - Identify and instruct in appropriate isolation precautions for identified infection/condition  Outcome: Progressing  Goal: Absence of fever/infection during neutropenic period  Description: INTERVENTIONS:  - Monitor WBC    Outcome: Progressing     Problem: SAFETY ADULT  Goal: Patient will remain free of falls  Description: INTERVENTIONS:  - Educate patient/family on patient safety including physical limitations  - Instruct patient to call for assistance with activity   - Consult OT/PT to assist with strengthening/mobility   - Keep Call bell within reach  - Keep bed low and locked with side rails adjusted as appropriate  - Keep care items and personal belongings within reach  - Initiate and maintain comfort rounds  - Make Fall Risk Sign visible to staff  - Offer Toileting every  Hours, in advance of need  - Initiate/Maintain alarm  - Obtain necessary fall risk management equipment:   - Apply yellow socks and bracelet for high fall risk patients  - Consider moving patient to room near nurses station  Outcome: Progressing  Goal: Maintain or return to baseline ADL function  Description: INTERVENTIONS:  -  Assess patient's ability to carry out ADLs; assess patient's baseline for ADL function and identify physical deficits which impact ability to perform ADLs (bathing, care of mouth/teeth, toileting, grooming, dressing, etc )  - Assess/evaluate cause of self-care deficits   - Assess range of motion  - Assess patient's mobility; develop plan if impaired  - Assess patient's need for assistive devices and provide as appropriate  - Encourage maximum independence but intervene and supervise when necessary  - Involve family in performance of ADLs  - Assess for home care needs following discharge   - Consider OT consult to assist with ADL evaluation and planning for discharge  - Provide patient education as appropriate  Outcome: Progressing  Goal: Maintains/Returns to pre admission functional level  Description: INTERVENTIONS:  - Perform BMAT or MOVE assessment daily    - Set and communicate daily mobility goal to care team and patient/family/caregiver  - Collaborate with rehabilitation services on mobility goals if consulted  - Perform Range of Motion  times a day  - Reposition patient every  hours    - Dangle patient  times a day  - Stand patient  times a day  - Ambulate patient  times a day  - Out of bed to chair  times a day   - Out of bed for meals times a day  - Out of bed for toileting  - Record patient progress and toleration of activity level   Outcome: Progressing     Problem: DISCHARGE PLANNING  Goal: Discharge to home or other facility with appropriate resources  Description: INTERVENTIONS:  - Identify barriers to discharge w/patient and caregiver  - Arrange for needed discharge resources and transportation as appropriate  - Identify discharge learning needs (meds, wound care, etc )  - Arrange for interpretive services to assist at discharge as needed  - Refer to Case Management Department for coordinating discharge planning if the patient needs post-hospital services based on physician/advanced practitioner order or complex needs related to functional status, cognitive ability, or social support system  Outcome: Progressing     Problem: Knowledge Deficit  Goal: Patient/family/caregiver demonstrates understanding of disease process, treatment plan, medications, and discharge instructions  Description: Complete learning assessment and assess knowledge base  Interventions:  - Provide teaching at level of understanding  - Provide teaching via preferred learning methods  Outcome: Progressing     Problem: Nutrition/Hydration-ADULT  Goal: Nutrient/Hydration intake appropriate for improving, restoring or maintaining nutritional needs  Description: Monitor and assess patient's nutrition/hydration status for malnutrition  Collaborate with interdisciplinary team and initiate plan and interventions as ordered  Monitor patient's weight and dietary intake as ordered or per policy  Utilize nutrition screening tool and intervene as necessary  Determine patient's food preferences and provide high-protein, high-caloric foods as appropriate       INTERVENTIONS:  - Monitor oral intake, urinary output, labs, and treatment plans  - Assess nutrition and hydration status and recommend course of action  - Evaluate amount of meals eaten  - Assist patient with eating if necessary   - Allow adequate time for meals  - Recommend/ encourage appropriate diets, oral nutritional supplements, and vitamin/mineral supplements  - Order, calculate, and assess calorie counts as needed  - Recommend, monitor, and adjust tube feedings and TPN/PPN based on assessed needs  - Assess need for intravenous fluids  - Provide specific nutrition/hydration education as appropriate  - Include patient/family/caregiver in decisions related to nutrition  Outcome: Progressing

## 2022-10-24 NOTE — OP NOTE
OPERATIVE REPORT  PATIENT NAME: Naheed Sharpe    :  1965  MRN: [id number]  Pt Location: [facility] OR ROOM 04    SURGERY DATE: 10/24/2022    Surgeon(s) and Role:     * Wander Santana MD - Primary     * Michelle Luna PA-C - Assisting    Preop Diagnosis:  Morbid obesity ([facility] [address] ) [E66 01]  Essential hypertension, malignant [I10]  Mixed hyperlipidemia [E78 2]    Post-Op Diagnosis Codes:     * Morbid obesity ([facility] [address] ) [E66 01]     * Essential hypertension, malignant [I10]     * Mixed hyperlipidemia [E78 2]    Procedure(s) (LRB):  LAPAROSCOPIC EMILY-EN-Y GASTRIC BYPASS AND INTRAOPERATIVE EGD (N/A)    Specimen(s):  * No specimens in log *    Estimated Blood Loss:   20 ml    Drains:  * No LDAs found *    Anesthesia Type:   General    Operative Indications: Morbid obesity ([facility] [address] ) [E66 01]  Essential hypertension, malignant [I10]  Mixed hyperlipidemia [E78 2]  BMI 37 69    Operative Findings:  Negative leak test     Complications:   None    Procedure and Technique:  The patient was identified by name, armband and conversation  The patient was then brought to the operative theatre  After successful induction of general anesthesia, the patient was prepped and draped in the usual sterile fashion  A timeout was performed and all were in agreement  Optiview technique was used to gain entrance into the abdomen with a 12 mm 0 degree laparoscope in the left upper quadrant  The abdomen was then insufflated to 15 mmHg  Three 12 mm ports were then placed in the right upper quadrant, left lower quadrant, and umbilicus  A 5 mm right lower quadrant port was placed  A 5 mm epigastric liver retractor was placed  Four quadrant Exparel/Marcaine transversus abdominus plane block was performed  The omentum was split using ultrasonic amy  Starting at the ligament of treitz 50 cm of small bowel was counted and divided with Medtronic stapler, tan load   Another 150 cm of small bowel was counted from here and a stapled jejunojejunostomy was created with the Medtronic stapler, tan load  The enterotomy was closed with a running 3-0 PDS  The mesenteric defect was then closed with a running 2-0 ethibond  The gastroesophageal fat pad was dissected  Perigastric dissection was used to enter the lesser sac 5 cm distal to the gastroesophageal junction  Gastric pouch was then created with one horizontal firing and two vertical firings with the Medtronic stapler, purple load  The willian limb was then brought antecolic/antegastric and a handsewn end to side gastrojejunostomy was then created with 3-0 PDS in two layers over a 34 [employer] Aditi levacuator tube  Excess afferent limb mesentery was ligated with harmonic and the small bowel was transected with the tan load  The endoscope was then advanced past the posterior pharynx into the gastric pouch  Air leak test was negative and the anastomosis was hemostatic  Ivey's space was then closed with a running 2-0 ethibond suture  The segment was removed via and endocatch bag at the umbilicus and the umbilical port was then closed with a transfascial 0 vicryl suture  All port sites were examined for bleeding as we exited the abdomen to ensure hemostasis  Port sites were then closed with monocryl and dermabond  All instrument, sponge and needle counts were correct  I was present for the entire procedure, A qualified resident physician was not available and A physician assistant was required during the procedure for retraction tissue handling,dissection and suturing, traction/countertraction, stapling, and performance of the intraoperative EGD       Patient Disposition:  PACU         SIGNATURE: Abdirahman Gross MD  DATE: October 24, 2022  TIME: 10:59 AM

## 2022-10-24 NOTE — CONSULTS
[address] 1965, 56 y o  female MRN: [id number]  Unit/Bed#: -01 Encounter: [id number]  Primary Care Provider: Macho Sotelo MD   Date and time admitted to hospital: 10/24/2022  6:48 AM    Inpatient consult to Internal Medicine  Consult performed by: Wing Greenfield MD  Consult ordered by: Naren Gibson PA-C          * Morbid (severe) obesity due to excess calories [facility])  Assessment & Plan  · History of morbid obesity status post laparoscopic Ailin-en-Y on 10/24/2022  Postoperative day 0  · Currently on p r n  Tylenol/Toradol/oxycodone/Dilaudid for pain control  · On clear liquid diet  · Plan of care per bariatric surgery team     Primary hypertension  Assessment & Plan  · Currently well controlled  · Continue patient's home medication lisinopril  Prediabetes  Assessment & Plan  · Last A1c 5 9 in feb' 22  · Dietary management  Mixed hyperlipidemia  Assessment & Plan  · Resume home Crestor at discharge  VTE Prophylaxis: Heparin  / sequential compression device     Recommendations for Discharge:  · cw home meds lisinopril/crestor for HTN/HLD management    Counseling / Coordination of Care Time: 30 minutes  Greater than 50% of total time spent on patient counseling and coordination of care  History of Present Illness:    Susanna Jones is a 56 y o  female who is originally admitted to the bariatric service due to morbid obesity status post Ailin-en-Y  We are consulted for medical management  Patient postop day 1  Seen at bedside  Reports complaints of significant abdominal pain but much improved currently after pain management  No nausea  Being started on clear liquid diet  She reports her hypertension/hyperlipidemia managed with lisinopril/Crestor  As regards her prediabetes, not currently on any oral hypoglycemic agents  Review of Systems:    Review of Systems   Constitutional: Negative for chills and fever     HENT: Negative for ear pain and sore throat  Eyes: Negative for pain and visual disturbance  Respiratory: Negative for cough and shortness of breath  Cardiovascular: Negative for chest pain and palpitations  Gastrointestinal: Positive for abdominal pain  Negative for vomiting  Genitourinary: Negative for dysuria and hematuria  Musculoskeletal: Negative for arthralgias and back pain  Skin: Negative for color change and rash  Neurological: Negative for seizures and syncope  All other systems reviewed and are negative  Past Medical and Surgical History:     Past Medical History:   Diagnosis Date   • Abnormal Pap smear of cervix    • Blood clot in eye    • Colon polyp    • Depression    • Hyperlipidemia    • Hypertension    • Obesity (BMI 30-39  9)    • Pre-diabetes    • Restless legs        Past Surgical History:   Procedure Laterality Date   • COLONOSCOPY     • DENTAL IMPLANT     • DILATION AND CURETTAGE OF UTERUS     • EGD  04/15/2022   • CT COLONOSCOPY FLX DX W/COLLJ SPEC WHEN PFRMD N/A 3/12/2018    Procedure: COLONOSCOPY;  Surgeon: River López MD;  Location: [facility]  GI LAB; Service: Gastroenterology       Meds/Allergies:    all medications and allergies reviewed    Allergies:    Allergies   Allergen Reactions   • Shrimp Flavor - Food Allergy Itching       Social History:     Marital Status: /Civil Union    Substance Use History:   Social History     Substance and Sexual Activity   Alcohol Use Not Currently     Social History     Tobacco Use   Smoking Status Former Smoker   • Years: 30 00   • Quit date: 2015   • Years since quittin 3   Smokeless Tobacco Never Used   Tobacco Comment    quit 2015     Social History     Substance and Sexual Activity   Drug Use No       Family History:    non-contributory    Physical Exam:     Vitals:   Blood Pressure: 104/68 (10/24/22 1216)  Pulse: 72 (10/24/22 1216)  Temperature: 97 7 °F (36 5 °C) (10/24/22 1115)  Temp Source: Oral (10/24/22 8503)  Respirations: 16 (10/24/22 1200)  Height: 5' 4" (162 6 cm) (10/24/22 0726)  Weight - Scale: 99 6 kg (219 lb 9 3 oz) (10/24/22 0726)  SpO2: 96 % (10/24/22 1352)    Physical Exam  Vitals and nursing note reviewed  Constitutional:       General: She is not in acute distress  Appearance: She is well-developed  She is obese  HENT:      Head: Normocephalic and atraumatic  Mouth/Throat:      Mouth: Mucous membranes are dry  Pharynx: Oropharynx is clear  Eyes:      Conjunctiva/sclera: Conjunctivae normal    Cardiovascular:      Rate and Rhythm: Normal rate and regular rhythm  Heart sounds: No murmur heard  Pulmonary:      Effort: Pulmonary effort is normal  No respiratory distress  Breath sounds: Normal breath sounds  Abdominal:      Palpations: Abdomen is soft  Tenderness: There is no abdominal tenderness  Musculoskeletal:      Cervical back: Neck supple  Skin:     General: Skin is warm and dry  Neurological:      General: No focal deficit present  Mental Status: She is alert  Psychiatric:         Mood and Affect: Mood normal        Additional Data:     Lab Results: I have personally reviewed pertinent reports  Lab Results   Component Value Date/Time    HGBA1C 5 9 (H) 02/07/2022 07:14 AM     Results from last 7 days   Lab Units 10/24/22  0738   POC GLUCOSE mg/dl 176*           Imaging: I have personally reviewed pertinent reports  No orders to display       ** Please Note: This note has been constructed using a voice recognition system   **

## 2022-10-25 ENCOUNTER — TRANSITIONAL CARE MANAGEMENT (OUTPATIENT)
Dept: FAMILY MEDICINE CLINIC | Facility: CLINIC | Age: 57
End: 2022-10-25

## 2022-10-25 VITALS
OXYGEN SATURATION: 96 % | WEIGHT: 219.58 LBS | RESPIRATION RATE: 17 BRPM | HEIGHT: 64 IN | SYSTOLIC BLOOD PRESSURE: 120 MMHG | TEMPERATURE: 98 F | HEART RATE: 63 BPM | BODY MASS INDEX: 37.49 KG/M2 | DIASTOLIC BLOOD PRESSURE: 74 MMHG

## 2022-10-25 LAB
ANION GAP SERPL CALCULATED.3IONS-SCNC: 8 MMOL/L (ref 4–13)
BUN SERPL-MCNC: 10 MG/DL (ref 5–25)
CALCIUM SERPL-MCNC: 8.4 MG/DL (ref 8.3–10.1)
CHLORIDE SERPL-SCNC: 104 MMOL/L (ref 96–108)
CO2 SERPL-SCNC: 24 MMOL/L (ref 21–32)
CREAT SERPL-MCNC: 0.64 MG/DL (ref 0.6–1.3)
ERYTHROCYTE [DISTWIDTH] IN BLOOD BY AUTOMATED COUNT: 13.6 % (ref 11.6–15.1)
GFR SERPL CREATININE-BSD FRML MDRD: 100 ML/MIN/1.73SQ M
GLUCOSE SERPL-MCNC: 89 MG/DL (ref 65–140)
HCT VFR BLD AUTO: 35.5 % (ref 34.8–46.1)
HGB BLD-MCNC: 11.5 G/DL (ref 11.5–15.4)
MCH RBC QN AUTO: 29.7 PG (ref 26.8–34.3)
MCHC RBC AUTO-ENTMCNC: 32.4 G/DL (ref 31.4–37.4)
MCV RBC AUTO: 92 FL (ref 82–98)
PLATELET # BLD AUTO: 330 THOUSANDS/UL (ref 149–390)
PMV BLD AUTO: 10.2 FL (ref 8.9–12.7)
POTASSIUM SERPL-SCNC: 4 MMOL/L (ref 3.5–5.3)
RBC # BLD AUTO: 3.87 MILLION/UL (ref 3.81–5.12)
SODIUM SERPL-SCNC: 136 MMOL/L (ref 135–147)
WBC # BLD AUTO: 9.7 THOUSAND/UL (ref 4.31–10.16)

## 2022-10-25 PROCEDURE — 99233 SBSQ HOSP IP/OBS HIGH 50: CPT | Performed by: INTERNAL MEDICINE

## 2022-10-25 PROCEDURE — 85027 COMPLETE CBC AUTOMATED: CPT | Performed by: PHYSICIAN ASSISTANT

## 2022-10-25 PROCEDURE — NC001 PR NO CHARGE: Performed by: SURGERY

## 2022-10-25 PROCEDURE — 80048 BASIC METABOLIC PNL TOTAL CA: CPT | Performed by: PHYSICIAN ASSISTANT

## 2022-10-25 PROCEDURE — 99024 POSTOP FOLLOW-UP VISIT: CPT | Performed by: SURGERY

## 2022-10-25 PROCEDURE — 94762 N-INVAS EAR/PLS OXIMTRY CONT: CPT

## 2022-10-25 RX ORDER — ACETAMINOPHEN 500 MG
1000 TABLET ORAL EVERY 8 HOURS
Qty: 42 TABLET | Refills: 0
Start: 2022-10-25 | End: 2022-11-01

## 2022-10-25 RX ADMIN — ACETAMINOPHEN 975 MG: 325 TABLET, FILM COATED ORAL at 11:27

## 2022-10-25 RX ADMIN — SODIUM CHLORIDE, SODIUM LACTATE, POTASSIUM CHLORIDE, AND CALCIUM CHLORIDE 100 ML/HR: .6; .31; .03; .02 INJECTION, SOLUTION INTRAVENOUS at 02:00

## 2022-10-25 RX ADMIN — KETOROLAC TROMETHAMINE 15 MG: 30 INJECTION, SOLUTION INTRAMUSCULAR at 05:08

## 2022-10-25 RX ADMIN — CEFAZOLIN SODIUM 2000 MG: 2 SOLUTION INTRAVENOUS at 01:20

## 2022-10-25 RX ADMIN — FAMOTIDINE 20 MG: 10 INJECTION, SOLUTION INTRAVENOUS at 09:36

## 2022-10-25 RX ADMIN — LISINOPRIL 5 MG: 5 TABLET ORAL at 09:36

## 2022-10-25 RX ADMIN — KETOROLAC TROMETHAMINE 15 MG: 30 INJECTION, SOLUTION INTRAMUSCULAR at 11:26

## 2022-10-25 RX ADMIN — METRONIDAZOLE 500 MG: 500 INJECTION, SOLUTION INTRAVENOUS at 00:28

## 2022-10-25 RX ADMIN — ACETAMINOPHEN 975 MG: 325 TABLET, FILM COATED ORAL at 05:08

## 2022-10-25 RX ADMIN — OXYCODONE HYDROCHLORIDE 5 MG: 5 SOLUTION ORAL at 09:44

## 2022-10-25 RX ADMIN — SIMETHICONE 80 MG: 80 TABLET, CHEWABLE ORAL at 09:36

## 2022-10-25 NOTE — UTILIZATION REVIEW
Initial Clinical Review    Elective surgical procedure  17690   Age/Sex: 56 y o  female  Surgery Date: 10/24  Procedure: LAPAROSCOPIC EMILY-EN-Y GASTRIC BYPASS AND INTRAOPERATIVE EGD     Anesthesia: general   Operative Findings: neg leak test     POD#1 Progress Note: POD 1 s/p LRYGB, EGD  Doing well , tolerating diet   Pain controlled  DC to home today     10/24 IM Consult   HTN well controled on lisinopril   HLD resume crestor       Admission Orders: Date/Time/Statement:   Admission Orders (From admission, onward)     Ordered        10/24/22 1102  Inpatient Admission  Once                      Orders Placed This Encounter   Procedures   • Inpatient Admission     Standing Status:   Standing     Number of Occurrences:   1     Order Specific Question:   Level of Care     Answer:   Med Surg [16]     Order Specific Question:   Bed Type     Answer:   Bariatric [1]     Order Specific Question:   Estimated length of stay     Answer:   Inpatient Only Surgery     Vital Signs: /74   Pulse 63   Temp 98 °F (36 7 °C)   Resp 17   Ht 5' 4" (1 626 m)   Wt 99 6 kg (219 lb 9 3 oz)   SpO2 94%   BMI 37 69 kg/m²     Pertinent Labs/Diagnostic Test Results:   No orders to display         Results from last 7 days   Lab Units 10/25/22  0454   WBC Thousand/uL 9 70   HEMOGLOBIN g/dL 11 5   HEMATOCRIT % 35 5   PLATELETS Thousands/uL 330     Results from last 7 days   Lab Units 10/25/22  0454   SODIUM mmol/L 136   POTASSIUM mmol/L 4 0   CHLORIDE mmol/L 104   CO2 mmol/L 24   ANION GAP mmol/L 8   BUN mg/dL 10   CREATININE mg/dL 0 64   EGFR ml/min/1 73sq m 100   CALCIUM mg/dL 8 4         Results from last 7 days   Lab Units 10/24/22  0738   POC GLUCOSE mg/dl 176*     Results from last 7 days   Lab Units 10/25/22  0454   GLUCOSE RANDOM mg/dL 89     Diet : bariatric  Mobility: amb pt  DVT Prophylaxis: SCD    Medications/Pain Control:   Scheduled Medications:  acetaminophen, 975 mg, Oral, Q6H PEÑA  famotidine, 20 mg, Intravenous, Q12H [facility]  ketorolac, 15 mg, Intravenous, Q6H [facility]  lisinopril, 5 mg, Oral, Daily  scopolamine, 1 patch, Transdermal, Once  simethicone, 80 mg, Oral, Q12H [facility]      Continuous IV Infusions:  lactated ringers, 100 mL/hr, Intravenous, Continuous      PRN Meds:  diphenhydrAMINE, 25 mg, Oral, HS PRN  HYDROmorphone, 1 mg, Intravenous, Q4H PRN  10/24  x1  LORazepam, 0 5 mg, Intravenous, Q6H PRN  metoclopramide, 10 mg, Intravenous, Q6H PRN  ondansetron, 4 mg, Intravenous, Q6H PRN  oxyCODONE, 10 mg, Oral, Q4H PRN  oxyCODONE, 5 mg, Oral, Q4H PRN  phenol, 2 spray, Mouth/Throat, Q2H PRN  promethazine, 25 mg, Intramuscular, Q6H PRN        Network Utilization Review Department  ATTENTION: Please call with any questions or concerns to [phone] and carefully listen to the prompts so that you are directed to the right person  All voicemails are confidential   Amrita Castro all requests for admission clinical reviews, approved or denied determinations and any other requests to dedicated fax number below belonging to the campus where the patient is receiving treatment   List of dedicated fax numbers for the Facilities:  [address] DENIALS (Administrative/Medical Necessity) [phone]   [address] (Maternity/NICU/Pediatrics) [phone]   [address] [phone]   [facility] [address] [phone]   [facility] [address] [place] [id number] [place] [address] [place] [address] [phone]   [address]par[address] Hector [facility] [address] [facility] [phone]

## 2022-10-25 NOTE — ASSESSMENT & PLAN NOTE
· History of morbid obesity status post laparoscopic Ailin-en-Y on 10/24/2022  Postoperative day 1  · Currently on p r n  Tylenol/Toradol/oxycodone/Dilaudid for pain control  · On clear liquid diet - ADAT per primary  · Plan of care per bariatric surgery team    Body mass index is 37 69 kg/m²      • Recommend incorporating a more whole foods plant-predominant diet along with decreasing consumption of red meats and processed foods  • Per AHA guidelines, recommend moderate-vigorous intensity exercise for 30 minutes a day for 5 days a week or a total of 150 min/week

## 2022-10-25 NOTE — DISCHARGE INSTRUCTIONS
Bariatric/Weight Loss Surgery  Hospital Discharge Instructions  ACTIVITY:  Progress as feels comfortable - a good rule is:  if you are doing something and it begins to hurt, stop doing the activity  Walk every hour while at home  You may walk stairs if you do so slowly  You may shower 48 hours after surgery  Use your incentive spirometer 10 times per hour while awake for 1 week  Do NOT drive for 48 hours after surgery  No driving 24 hours after taking certain prescription pain medications   Examples of such medication are Percocet, Darvocet, Oxycodone, Tylenol #3, and Tylenol with Codeine  Follow your pharmacist’s orders  DIET  Stay on a liquid diet for 7 days after your surgery date, sipping slowly  Refer to your manual for examples of choices  Remember to keep your liquids sugar free or low calorie  You may have protein drinks  Make sure to drink 48 to 64 ounces per day of fluids  You may advance to a pureed diet one week after surgery as instructed by your diet progression pamphlet  Once you get approval from your surgeon at your first post operative visit you may advance to the soft diet  MEDICATIONS:  The abdominal nerve block will wear off during the first 1-2 days that you are home, and you may become sore  Continue to take your Tylenol and your pain medication as instructed  Start vitamins and minerals 1 week after surgery   Anti-acid Medication as per prescription  Other medications as indicated on the Physician Patient Discharge Instructions form given to you at the time of discharge  Make sure that you are splitting your pill or tablet medications in halves or fourths or even crushing them before you take them  Capsules should be opened and mixed with water or jello  You need to do this for at least 4 weeks after surgery  Eventually you will be able to take your medications the regular way as they were prescribed     You will need to consult with your Family Doctor in regards to all your prescribed medication, particularly those for blood pressure and diabetes  As you lose weight, medical conditions may change, requiring an alteration or elimination of the drug dose  DO NOT TAKE BIRTH CONTROL(BC) MEDICATIONS, INSERT BC VAGINAL RINGS, OR PLACE IUD OR ANY OTHER BC METHODS UNTIL 31 DAYS FROM DAY OF DISCHARGE FROM HOSPITAL  THIS PLACES YOU AT HIGH RISK FOR A POTENTIALLY LIFE THREATENING BLOOD CLOT  Remember to always use barrier methods for birth control and speak to your GYN about using two forms of birth control to start 31 days after surgery  It is very important to avoid pregnancy until at least 18-24 months after surgery  INCISION CARE  You may shower and get incisions wet 2 days after surgery  No soaking tub baths or swimming for 30 days after surgery  Keep abdominal area and incisions clean  Use soap and water to create a good lather and rinse off  Do not scrub incisions  If you have a drain, empty the drain as the nurses instructed  FOLLOW-UP APPOINTMENT should be made for one week after discharge  Call surgeon’s office at [phone] to schedule an appointment  CALL YOUR DOCTOR FOR:  pain not controlled by pain medications, a temperature greater than 101 5° F, any increase or change in drainage or redness from any incision, any vomiting or inability to keep liquids down, shortness of breath, shoulder pain, or bleeding        Letter and Information for Patient's Primary Health Care Provider      Dear Harjinder Liao,       Your patient had bariatric surgery on this admission to [address] [place] [address]  Due to the restrictive and/or malabsorptive nature of their procedure our surgeons recommend routine lab studies  Your patients’ surgeon will provide orders initially and ask that they continue to follow-up with us for life even if they see you  As time moves on some patients prefer to follow-up only with their family doctor   We ask that you discuss this regular work-up with them when they make an appointment to see you  *The lab studies recommended to best identify deficiencies are: CBC, CMP, Lipid Profile, Fe, TIBC, %Sat, Vitamin D, Folate, B12, Whole Blood Thiamine, Vitamin A, PTH, Zinc and Ferritin  We recommend HgbA1C for diabetics  *These studies should be done minimally at 6 months and a year post-operatively and then yearly thereafter  *Recommended Daily Supplements: Please see the attached Vitamin Sheet    If your patient has any dietary or psycho-social concerns, they can follow-up with our Team Dietitian and Licensed Clinical   They can reach these team members by calling the [facility]  We also encourage them to follow up at our regularly scheduled support groups or join our [employer] [facility] Bariatric Patient Forum  For your convenience we have also included a list of medicines that should be avoided after weight loss surgery and a list of the vitamin and minerals they should be taking for the rest of their lives  The patients are provided this information as well  The [facility] Bariatric Team would like to thank you for the opportunity to assist in the care of your patient  Feel free to contact us with any questions by calling the [facility] Weight Management office at [phone]    Sincerely,         [facility] Weight Management Center Team    Additional Information for Providers and Patients                      Vitamins After Ailin en Y Gastric Bypass or Sleeve Gastrectomy Surgery    Due to the decreased absorption of nutrients and the decreased amount of food eaten it is difficult to obtain all the nutrients needed consuming food  We recommend a bariatric formulated vitamin for the rest of your life  If you wish to use an over the counter vitamins please understand you may not get all the recommended daily requirements  Use the following guidelines for over the counter vitamins  Multivitamins   We recommend 2 chewable multivitamins with iron (do not take gummy chewable as they do not contain thiamine)    You can continue with the chewable or take any well formulated, high potency multivitamin containing 22 nutrients including zinc and copper  If you decide to take a bariatric vitamin the number of vitamins that you need to take will vary  Refer to the chart provided at team meeting    Calcium - Calcium is absorbed in the part of the small bowel that is bypassed in gastric bypass patients  In addition, as you lose weight, you are more at risk for loss of bone density leading to osteoporosis  The best form of calcium is Calcium Citrate  This form of calcium is better absorbed after your surgery  Recommended daily dose is 1500 mg  Take 500 mg in (3) divided doses  You can only absorb about 500 mg at a time  We recommend 2000 IU of vitamin D3 per day, in addition to what is in your calcium supplement  If you were instructed to take a higher dose based on a deficiency, then continue to take the higher vitamin D dose  Iron - Iron is absorbed in the part of the small bowel that is bypassed  You will need to take extra iron in addition to what is already in the multivitamin if you are a menstruating woman (25-45 mg of additional elemental iron) or have been diagnosed with an iron deficiency  We recommend iron in the form of Ferrous Fumarate with Vitamin C  Follow the instructions on the package or bottle unless your physician has given other dosage amounts  B12 (Cyanocobalamin) may also be decreased  Additional Vitamin B12 is recommended if you are not taking a bariatric vitamin  Take 350 to 500 micrograms (mcg) per day of B12 (Cyanocobalamin) in a sublingual form (for under the tongue)     Note:  Calcium interferes with the absorption of iron, so it is recommended that you take the calcium at least 2 hours apart from iron  The tannins in tea also interfere with the absorption of iron  Note: Anti-ulcer medications interfere with the absorption of calcium iron and B12  Space your anti-ulcer medication 2 hours apart from your vitamins  * Based on the recommendations of the ASMBS and the National Osteoporosis foundation    Non-steroidal anti-inflammatory drugs or medications containing them  You should take caution or avoid these medications as they could harm your pouch or sleeve  **This is a sample list and is not all inclusive  Please read labels carefully  **      Non Steroidal anti-inflammatory drugs  Advil (ibuprofen)  Aleve (naproxen)  Anaprox (naproxen)  Ansaid (flurbiprofen)  Azolid (phenylbutazone)  Bextra (valdecoxib)  Butazolidin (phenylbutazone)  Celebrex (celecoxib)  Clinoril (sulindac)  Dolobid (diflunisal)  Excedrin IB (ibuprofen)  Feldene (piroxicam)  Ibuprin (ibuprofen)  Indocin (indomethacin)  Lodine (etodolac)  Meclomen (meclofenamate)  Midol IB (ibuprofen)  Motrin IB (ibuprofen)  Nalfon (fenoprofen)  Naprosyn (naproxen)  Nuprin (ibuprofen)  Orudis (ketoprofen)  Oruvail (ketoprofen)  Pamprin - IB (ibuprofen)  Ponstel (mefenamic acid)  Rexolate (sodium thiosalicylate)  Tandearil (oxyphenbutazone)  Tolectin (tolmetin)  Voltaren (diclofenac)      Barbiturate  Fiorinal (butalbital/aspirin/caffeine)    Salicylates  Amigesic (salsalate)  Anacin (aspirin)  Arthropan (choline salicylate)  Ascriptin (buffered aspirin)  Aspirin (aspirin)  Aspirtab (aspirin)  Bufferin (buffered aspirin)  Disalcid (salsalate)  Ecotrin (aspirin)  Uracel (sodium salicylate)    Analgesics  Equagesic (meprobamate/aspirin)  Micrainin (meprobamate/aspirin)  Percodan (oxycodone/aspirin)    OTC  Pepto-Bismol®  Sangeetha-Council®  Excedrin®    For Gastric Bypass Patients  Extended Release Medications  Sustained Release Medications  Time Released Medications

## 2022-10-25 NOTE — PLAN OF CARE
Problem: PAIN - ADULT  Goal: Verbalizes/displays adequate comfort level or baseline comfort level  Description: Interventions:  - Encourage patient to monitor pain and request assistance  - Assess pain using appropriate pain scale  - Administer analgesics based on type and severity of pain and evaluate response  - Implement non-pharmacological measures as appropriate and evaluate response  - Consider cultural and social influences on pain and pain management  - Notify physician/advanced practitioner if interventions unsuccessful or patient reports new pain  10/24/2022 2015 by Juancho Nath LPN  Outcome: Progressing  10/24/2022 2015 by Juancho Nath LPN  Outcome: Progressing     Problem: INFECTION - ADULT  Goal: Absence or prevention of progression during hospitalization  Description: INTERVENTIONS:  - Assess and monitor for signs and symptoms of infection  - Monitor lab/diagnostic results  - Monitor all insertion sites, i e  indwelling lines, tubes, and drains  - Monitor endotracheal if appropriate and nasal secretions for changes in amount and color  - [place] appropriate cooling/warming therapies per order  - Administer medications as ordered  - Instruct and encourage patient and family to use good hand hygiene technique  - Identify and instruct in appropriate isolation precautions for identified infection/condition  10/24/2022 2015 by Juancho Nath LPN  Outcome: Progressing  10/24/2022 2015 by Juancho Nath LPN  Outcome: Progressing     Problem: DISCHARGE PLANNING  Goal: Discharge to home or other facility with appropriate resources  Description: INTERVENTIONS:  - Identify barriers to discharge w/patient and caregiver  - Arrange for needed discharge resources and transportation as appropriate  - Identify discharge learning needs (meds, wound care, etc )  - Arrange for interpretive services to assist at discharge as needed  - Refer to Case Management Department for coordinating discharge planning if the patient needs post-hospital services based on physician/advanced practitioner order or complex needs related to functional status, cognitive ability, or social support system  10/24/2022 2015 by Иван Duarte LPN  Outcome: Progressing  10/24/2022 2015 by Иван Duarte LPN  Outcome: Progressing     Problem: Nutrition/Hydration-ADULT  Goal: Nutrient/Hydration intake appropriate for improving, restoring or maintaining nutritional needs  Description: Monitor and assess patient's nutrition/hydration status for malnutrition  Collaborate with interdisciplinary team and initiate plan and interventions as ordered  Monitor patient's weight and dietary intake as ordered or per policy  Utilize nutrition screening tool and intervene as necessary  Determine patient's food preferences and provide high-protein, high-caloric foods as appropriate  INTERVENTIONS:  - Monitor oral intake, urinary output, labs, and treatment plans  - Assess nutrition and hydration status and recommend course of action  - Evaluate amount of meals eaten  - Assist patient with eating if necessary   - Allow adequate time for meals  - Recommend/ encourage appropriate diets, oral nutritional supplements, and vitamin/mineral supplements  - Order, calculate, and assess calorie counts as needed  - Recommend, monitor, and adjust tube feedings and TPN/PPN based on assessed needs  - Assess need for intravenous fluids  - Provide specific nutrition/hydration education as appropriate  - Include patient/family/caregiver in decisions related to nutrition  10/24/2022 2015 by Иван Duarte LPN  Outcome: Progressing  10/24/2022 2015 by Иван Duarte LPN  Outcome: Progressing     Problem: Knowledge Deficit  Goal: Patient/family/caregiver demonstrates understanding of disease process, treatment plan, medications, and discharge instructions  Description: Complete learning assessment and assess knowledge base    Interventions:  - Provide teaching at level of understanding  - Provide teaching via preferred learning methods  10/24/2022 2015 by Adenike Win LPN  Outcome: Progressing  10/24/2022 2015 by Adenike Win LPN  Outcome: Progressing

## 2022-10-25 NOTE — PROGRESS NOTES
Progress Note - Bariatric Surgery   Winifred Perez 56 y o  female MRN: [id number]  Unit/Bed#: -01 Encounter: [id number]    Assessment/Plan:  57/F POD 1 s/p LRYGB, EGD    Cont diet  OOB/ambulate  IS  DVT ppx  Pain control   IVF  D/c home today    Subjective/Objective     Subjective: Patient is doing well  She is tolerating a diet  Pain is controlled  Review of systems: Negative except as noted above    Objective:     Blood pressure 120/74, pulse 63, temperature 98 °F (36 7 °C), resp  rate 17, height 5' 4" (1 626 m), weight 99 6 kg (219 lb 9 3 oz), SpO2 96 %  ,Body mass index is 37 69 kg/m²  Intake/Output Summary (Last 24 hours) at 10/25/2022 1240  Last data filed at 10/25/2022 1232  Gross per 24 hour   Intake 3910 63 ml   Output 900 ml   Net 3010 63 ml       Invasive Devices  Report    None                 Physical Exam:  No distress   RRR  Breathing non labored   Abd soft, NT/ND; wounds C/D/I without surrounding erythema                Lab, Imaging and other studies:  I have personally reviewed pertinent lab results    , CBC:   Lab Results   Component Value Date    WBC 9 70 10/25/2022    HGB 11 5 10/25/2022    HCT 35 5 10/25/2022    MCV 92 10/25/2022     10/25/2022    MCH 29 7 10/25/2022    MCHC 32 4 10/25/2022    RDW 13 6 10/25/2022    MPV 10 2 10/25/2022   , CMP:   Lab Results   Component Value Date    SODIUM 136 10/25/2022    K 4 0 10/25/2022     10/25/2022    CO2 24 10/25/2022    BUN 10 10/25/2022    CREATININE 0 64 10/25/2022    CALCIUM 8 4 10/25/2022    EGFR 100 10/25/2022     VTE Pharmacologic Prophylaxis: Reason for no pharmacologic prophylaxis anastomotic bleed risk  VTE Mechanical Prophylaxis: sequential compression device

## 2022-10-25 NOTE — ASSESSMENT & PLAN NOTE
Lab Results   Component Value Date    HGBA1C 5 9 (H) 02/07/2022     Recent Labs     10/24/22  0738   POCGLU 176*     · Last A1c 5 9 in feb' 22  · Dietary management

## 2022-10-25 NOTE — ASSESSMENT & PLAN NOTE
Blood Pressure: 120/74    · Currently well controlled  · Continue patient's home medication lisinopril

## 2022-10-25 NOTE — DISCHARGE SUMMARY
Discharge Summary - Brandon Hogan 56 y o  female MRN: [id number]    Unit/Bed#: -01 Encounter: [id number]      Pre-Operative Diagnosis: Pre-Op Diagnosis Codes:     * Morbid obesity ([facility] [place] [address] ) [E66 01]     * Essential hypertension, malignant [I10]     * Mixed hyperlipidemia [E78 2]    Post-Operative Diagnosis: Post-Op Diagnosis Codes:     * Morbid obesity ([facility] [place] [address] ) [E66 01]     * Essential hypertension, malignant [I10]     * Mixed hyperlipidemia [E78 2]    Procedures Performed:  Procedure(s):  LAPAROSCOPIC EMILY-EN-Y GASTRIC BYPASS AND INTRAOPERATIVE EGD    Surgeon: River Sutherland MD    See H & P for full details of admission and Operative Note for full details of operations performed  Patient was admitted for elective bariatric surgery  POD 1 following RYGB, she did well  She is tolerating a diet, ambulating, micturating without difficulty, and pain is controlled  She desires to be discharged home  Patient was seen and examined prior to discharge  Provisions for Follow-Up Care:  See After Visit Summary for information related to follow-up care and home orders  Disposition: Home, in stable condition  Planned Readmission: No    Discharge Medications:  See After Visit Summary for reconciled discharge medications provided to patient and family  Post Operative instructions: Reviewed with patient and/or family  This text is generated with voice recognition software  There may be translation, syntax,  or grammatical errors  If you have any questions, please contact the dictating provider       Signature:   River Sutherland MD  Date: 10/25/2022 Time: 12:44 PM

## 2022-10-25 NOTE — PLAN OF CARE
Problem: PAIN - ADULT  Goal: Verbalizes/displays adequate comfort level or baseline comfort level  Description: Interventions:  - Encourage patient to monitor pain and request assistance  - Assess pain using appropriate pain scale  - Administer analgesics based on type and severity of pain and evaluate response  - Implement non-pharmacological measures as appropriate and evaluate response  - Consider cultural and social influences on pain and pain management  - Notify physician/advanced practitioner if interventions unsuccessful or patient reports new pain  Outcome: Progressing     Problem: INFECTION - ADULT  Goal: Absence or prevention of progression during hospitalization  Description: INTERVENTIONS:  - Assess and monitor for signs and symptoms of infection  - Monitor lab/diagnostic results  - Monitor all insertion sites, i e  indwelling lines, tubes, and drains  - Monitor endotracheal if appropriate and nasal secretions for changes in amount and color  - [place] appropriate cooling/warming therapies per order  - Administer medications as ordered  - Instruct and encourage patient and family to use good hand hygiene technique  - Identify and instruct in appropriate isolation precautions for identified infection/condition  Outcome: Progressing  Goal: Absence of fever/infection during neutropenic period  Description: INTERVENTIONS:  - Monitor WBC    Outcome: Progressing     Problem: SAFETY ADULT  Goal: Patient will remain free of falls  Description: INTERVENTIONS:  - Educate patient/family on patient safety including physical limitations  - Instruct patient to call for assistance with activity   - Consult OT/PT to assist with strengthening/mobility   - Keep Call bell within reach  - Keep bed low and locked with side rails adjusted as appropriate  - Keep care items and personal belongings within reach  - Initiate and maintain comfort rounds  - Make Fall Risk Sign visible to staff  - Initiate/Maintain bed alarm  - Apply yellow socks and bracelet for high fall risk patients  - Consider moving patient to room near nurses station  Outcome: Progressing  Goal: Maintain or return to baseline ADL function  Description: INTERVENTIONS:  -  Assess patient's ability to carry out ADLs; assess patient's baseline for ADL function and identify physical deficits which impact ability to perform ADLs (bathing, care of mouth/teeth, toileting, grooming, dressing, etc )  - Assess/evaluate cause of self-care deficits   - Assess range of motion  - Assess patient's mobility; develop plan if impaired  - Assess patient's need for assistive devices and provide as appropriate  - Encourage maximum independence but intervene and supervise when necessary  - Involve family in performance of ADLs  - Assess for home care needs following discharge   - Consider OT consult to assist with ADL evaluation and planning for discharge  - Provide patient education as appropriate  Outcome: Progressing  Goal: Maintains/Returns to pre admission functional level  Description: INTERVENTIONS:  - Perform BMAT or MOVE assessment daily    - Set and communicate daily mobility goal to care team and patient/family/caregiver     - Collaborate with rehabilitation services on mobility goals if consulted  - Ambulate patient 3 times a day  - Out of bed to chair 3 times a day   - Out of bed for meals 3 times a day  - Out of bed for toileting  - Record patient progress and toleration of activity level   Outcome: Progressing     Problem: DISCHARGE PLANNING  Goal: Discharge to home or other facility with appropriate resources  Description: INTERVENTIONS:  - Identify barriers to discharge w/patient and caregiver  - Arrange for needed discharge resources and transportation as appropriate  - Identify discharge learning needs (meds, wound care, etc )  - Arrange for interpretive services to assist at discharge as needed  - Refer to Case Management Department for coordinating discharge planning if the patient needs post-hospital services based on physician/advanced practitioner order or complex needs related to functional status, cognitive ability, or social support system  Outcome: Progressing     Problem: Knowledge Deficit  Goal: Patient/family/caregiver demonstrates understanding of disease process, treatment plan, medications, and discharge instructions  Description: Complete learning assessment and assess knowledge base  Interventions:  - Provide teaching at level of understanding  - Provide teaching via preferred learning methods  Outcome: Progressing     Problem: Nutrition/Hydration-ADULT  Goal: Nutrient/Hydration intake appropriate for improving, restoring or maintaining nutritional needs  Description: Monitor and assess patient's nutrition/hydration status for malnutrition  Collaborate with interdisciplinary team and initiate plan and interventions as ordered  Monitor patient's weight and dietary intake as ordered or per policy  Utilize nutrition screening tool and intervene as necessary  Determine patient's food preferences and provide high-protein, high-caloric foods as appropriate       INTERVENTIONS:  - Monitor oral intake, urinary output, labs, and treatment plans  - Assess nutrition and hydration status and recommend course of action  - Evaluate amount of meals eaten  - Assist patient with eating if necessary   - Allow adequate time for meals  - Recommend/ encourage appropriate diets, oral nutritional supplements, and vitamin/mineral supplements  - Order, calculate, and assess calorie counts as needed  - Recommend, monitor, and adjust tube feedings and TPN/PPN based on assessed needs  - Assess need for intravenous fluids  - Provide specific nutrition/hydration education as appropriate  - Include patient/family/caregiver in decisions related to nutrition  Outcome: Progressing     Problem: Potential for Falls  Goal: Patient will remain free of falls  Description: INTERVENTIONS:  - Educate patient/family on patient safety including physical limitations  - Instruct patient to call for assistance with activity   - Consult OT/PT to assist with strengthening/mobility   - Keep Call bell within reach  - Keep bed low and locked with side rails adjusted as appropriate  - Keep care items and personal belongings within reach  - Initiate and maintain comfort rounds  - Make Fall Risk Sign visible to staff  - Initiate/Maintain bed alarm  - Apply yellow socks and bracelet for high fall risk patients  - Consider moving patient to room near nurses station  Outcome: Progressing

## 2022-10-25 NOTE — ASSESSMENT & PLAN NOTE
Body mass index is 37 69 kg/m²      • Recommend incorporating a more whole foods plant-predominant diet along with decreasing consumption of red meats and processed foods  • Per AHA guidelines, recommend moderate-vigorous intensity exercise for 30 minutes a day for 5 days a week or a total of 150 min/week

## 2022-10-25 NOTE — PROGRESS NOTES
[facility]  Progress Note - Gill Slider 1965, 56 y o  female MRN: [id number]  Unit/Bed#: -01 Encounter: [id number]  Primary Care Provider: Tomas Flores MD   Date and time admitted to hospital: 10/24/2022  6:48 AM    * Morbid (severe) obesity due to excess calories [facility])  Assessment & Plan  · History of morbid obesity status post laparoscopic Ailin-en-Y on 10/24/2022  Postoperative day 1  · Currently on p r n  Tylenol/Toradol/oxycodone/Dilaudid for pain control  · On clear liquid diet - ADAT per primary  · Plan of care per bariatric surgery team    Body mass index is 37 69 kg/m²  • Recommend incorporating a more whole foods plant-predominant diet along with decreasing consumption of red meats and processed foods  • Per AHA guidelines, recommend moderate-vigorous intensity exercise for 30 minutes a day for 5 days a week or a total of 150 min/week      Obesity, Class II, BMI 35-39 9  Assessment & Plan  Body mass index is 37 69 kg/m²  • Recommend incorporating a more whole foods plant-predominant diet along with decreasing consumption of red meats and processed foods  • Per AHA guidelines, recommend moderate-vigorous intensity exercise for 30 minutes a day for 5 days a week or a total of 150 min/week      Prediabetes  Assessment & Plan  Lab Results   Component Value Date    HGBA1C 5 9 (H) 02/07/2022     Recent Labs     10/24/22  0738   POCGLU 176*     · Last A1c 5 9 in feb' 22  · Dietary management  Mixed hyperlipidemia  Assessment & Plan  · Resume home Crestor at discharge  Primary hypertension  Assessment & Plan  Blood Pressure: 120/74    · Currently well controlled  · Continue patient's home medication lisinopril  VTE Pharmacologic Prophylaxis: VTE Score: 4 Moderate Risk (Score 3-4) - Pharmacological DVT Prophylaxis Ordered: heparin  Patient Centered Rounds: I performed bedside rounds with nursing staff today    Discussions with Specialists or Other Care Team Provider: Primary    Education and Discussions with Family / Patient: Updated  (family) at bedside  Time Spent for Care: 70 minutes  More than 50% of total time spent on counseling and coordination of care as described above  Current Length of Stay: 1 day(s)  Current Patient Status: Inpatient   Certification Statement: Patient is a SLIM consult on inpatient  management of dispo per primary  Discharge Plan: Mika Novoa  is following this patient on consult  They are medically stable for discharge when deemed appropriate by primary service  Code Status: No Order    Subjective:   Offers no complaints at this time other than generalized soreness in the abdomen 2/2 surgery  Discussed IS use  All questions answered  Objective:     Vitals:   Temp (24hrs), Av 8 °F (36 6 °C), Min:97 6 °F (36 4 °C), Max:98 °F (36 7 °C)    Temp:  [97 6 °F (36 4 °C)-98 °F (36 7 °C)] 98 °F (36 7 °C)  HR:  [63-91] 63  Resp:  [16-28] 17  BP: (104-129)/(68-81) 120/74  SpO2:  [92 %-97 %] 94 %  Body mass index is 37 69 kg/m²  Input and Output Summary (last 24 hours): Intake/Output Summary (Last 24 hours) at 10/25/2022 1018  Last data filed at 10/25/2022 0123  Gross per 24 hour   Intake 3880 63 ml   Output 700 ml   Net 3180 63 ml       Physical Exam:   Physical Exam  Vitals and nursing note reviewed  Constitutional:       General: She is not in acute distress  Appearance: She is well-developed  She is obese  HENT:      Head: Normocephalic and atraumatic  Mouth/Throat:      Mouth: Mucous membranes are dry  Pharynx: Oropharynx is clear  Eyes:      Conjunctiva/sclera: Conjunctivae normal    Cardiovascular:      Rate and Rhythm: Normal rate and regular rhythm  Heart sounds: No murmur heard  Pulmonary:      Effort: Pulmonary effort is normal  No respiratory distress  Breath sounds: Normal breath sounds  Abdominal:      General: Bowel sounds are decreased  Palpations: Abdomen is soft  Tenderness: There is generalized abdominal tenderness (soreness)  Musculoskeletal:      Cervical back: Neck supple  Right lower leg: No edema  Left lower leg: No edema  Skin:     General: Skin is warm and dry  Neurological:      General: No focal deficit present  Mental Status: She is alert  Psychiatric:         Mood and Affect: Mood normal          Behavior: Behavior normal          Thought Content:  Thought content normal          Judgment: Judgment normal         Additional Data:     Labs:  Results from last 7 days   Lab Units 10/25/22  0454   WBC Thousand/uL 9 70   HEMOGLOBIN g/dL 11 5   HEMATOCRIT % 35 5   PLATELETS Thousands/uL 330     Results from last 7 days   Lab Units 10/25/22  0454   SODIUM mmol/L 136   POTASSIUM mmol/L 4 0   CHLORIDE mmol/L 104   CO2 mmol/L 24   BUN mg/dL 10   CREATININE mg/dL 0 64   ANION GAP mmol/L 8   CALCIUM mg/dL 8 4   GLUCOSE RANDOM mg/dL 89         Results from last 7 days   Lab Units 10/24/22  0738   POC GLUCOSE mg/dl 176*               Lines/Drains:  Invasive Devices  Report    Peripheral Intravenous Line  Duration           Peripheral IV 10/24/22 Left;Ventral (anterior) Hand 1 day    Peripheral IV 10/24/22 Right Wrist 1 day                      Imaging: Reviewed radiology reports from this admission including: procedure reports    Recent Cultures (last 7 days):         Last 24 Hours Medication List:   Current Facility-Administered Medications   Medication Dose Route Frequency Provider Last Rate   • acetaminophen  975 mg Oral Q6H [address] Vamsi Crowder PA-C     • diphenhydrAMINE  25 mg Oral HS PRN Vamsi Crowder PA-C     • famotidine  20 mg Intravenous Q12H [address] Vamsi Crowder PA-C     • HYDROmorphone  1 mg Intravenous Q4H PRN Vamsi Crowder PA-C     • ketorolac  15 mg Intravenous Q6H [address] Vamsi Crowder PA-C     • lactated ringers  100 mL/hr Intravenous Continuous Vamsi Crowder PA-C 100 mL/hr (10/25/22 0200)   • lisinopril  5 mg Oral Daily Vamsi Crowder LEILA     • LORazepam  0 5 mg Intravenous Q6H PRN Denece Mellow, LEILA     • metoclopramide  10 mg Intravenous Q6H PRN Denece Mellow, LEILA     • ondansetron  4 mg Intravenous Q6H PRN Denece Mellow, LEILA     • oxyCODONE  10 mg Oral Q4H PRN Denece Mellow, LEILA     • oxyCODONE  5 mg Oral Q4H PRN Denece Mellow, LEILA     • phenol  2 spray Mouth/Throat Q2H PRN Denece Mellow, LEILA     • promethazine  25 mg Intramuscular Q6H PRN Denece Mellow, LEILA     • scopolamine  1 patch Transdermal Once Shiraz Espitia MD     • simethicone  80 mg Oral Q12H [address] Denece Mellow, LEILA          Today, Patient Was Seen By: Maria Del Rosario Call    **Please Note: This note may have been constructed using a voice recognition system  **

## 2022-10-26 ENCOUNTER — TELEPHONE (OUTPATIENT)
Dept: BARIATRICS | Facility: CLINIC | Age: 57
End: 2022-10-26

## 2022-10-26 NOTE — TELEPHONE ENCOUNTER
Post op follow up phone call completed   Pt is sipping liquids very well, using IS as instructed, reinforced importance of using IS to help prevent pneumonia  Ambulating about home without difficulty   Pain controlled with analgesia   Reaffirmed examples of liquid diet over the next week   Pt stated understanding about discharge instructions and medication adjustments   Follow up appt with surgeon scheduled for next week    Instructed to call with any additional questions or concerns

## 2022-10-31 ENCOUNTER — OFFICE VISIT (OUTPATIENT)
Dept: FAMILY MEDICINE CLINIC | Facility: CLINIC | Age: 57
End: 2022-10-31

## 2022-10-31 VITALS
TEMPERATURE: 97.9 F | HEIGHT: 64 IN | BODY MASS INDEX: 35.85 KG/M2 | DIASTOLIC BLOOD PRESSURE: 70 MMHG | RESPIRATION RATE: 16 BRPM | OXYGEN SATURATION: 98 % | WEIGHT: 210 LBS | HEART RATE: 82 BPM | SYSTOLIC BLOOD PRESSURE: 108 MMHG

## 2022-10-31 DIAGNOSIS — R73.03 PREDIABETES: ICD-10-CM

## 2022-10-31 DIAGNOSIS — I10 PRIMARY HYPERTENSION: ICD-10-CM

## 2022-10-31 DIAGNOSIS — Z98.84 BARIATRIC SURGERY STATUS: Primary | ICD-10-CM

## 2022-10-31 DIAGNOSIS — E78.2 MIXED HYPERLIPIDEMIA: ICD-10-CM

## 2022-10-31 PROBLEM — K91.2 POSTSURGICAL MALABSORPTION: Status: ACTIVE | Noted: 2022-10-31

## 2022-10-31 PROBLEM — Z48.815 ENCOUNTER FOR SURGICAL AFTERCARE FOLLOWING SURGERY OF DIGESTIVE SYSTEM: Status: ACTIVE | Noted: 2022-10-31

## 2022-10-31 NOTE — PROGRESS NOTES
Name: Vanesa Montejo      : 1965      MRN: [id number]  Encounter Provider: Magnus Pink MD  Encounter Date: 10/31/2022   Encounter department: [address] [place] [address]     1  Bariatric surgery status  Assessment & Plan:  She is doing well  She has follow up appointment scheduled for her surgical follow up  She will continue her dietary restrictions and has follow up with nutritionist as well  Asked patient to return or call if she develops any orthostatic symptoms with continued weight loss; if so may need titration of lisinopril  2  Primary hypertension  Assessment & Plan:  Well controlled on lisinopril 5 mg  She denies orthostatic type symptoms and was asked to call if she develops these with further weight loss  3  Mixed hyperlipidemia  Assessment & Plan:  Continue rosuvastatin and will follow periodic labs  4  Prediabetes  Assessment & Plan:  Likely improved; we will continue to follow  Subjective      As per TCM  She had laparoscopic willian en y bariatric surgery 10/24  She is drinking her fluids and has started on soft foods  Her pain is well controlled and she is off her pain meds  There is no dizziness or lightheadedness  TCM Call     Date and time call was made  10/25/2022  1:47 PM    Hospital care reviewed  Records reviewed    Patient was hospitialized at  [facility]    Date of Admission  10/24/22    Date of discharge  10/25/22    Diagnosis  Morbid (severe) obesity due to excess calories ([address] )    Disposition  Home    Were the patients medications reviewed and updated  Yes    Current Symptoms  None      TCM Call     Post hospital issues  None    Should patient be enrolled in anticoag monitoring? No    Scheduled for follow up?   Yes    Patients specialists  Other (comment)    Other specialists names  John Cruz MD    Did you obtain your prescribed medications  Yes    Do you need help managing your prescriptions or medications No    Is transportation to your appointment needed  No    I have advised the patient to call PCP with any new or worsening symptoms  [address] [place] [address] or [employer] other    Support System  Family    The type of support provided  Physical; Emotional    Do you have social support  Yes, as much as I need    Are you recieving any outpatient services  No    Are you recieving home care services  No    Interperter language line needed  No    Counseling  Patient    Counseling topics  Activities of daily living; Importance of RX compliance; instructions for management; Risk factor reduction    Comments  Winifred states that she is currently doing fine  She just came home from the hospital but so far, no complaints  She is afebrile and pain is controlled  She is tolerating her liquid diet  She knows to call her surgeon if no bowel movement (they gave her a bowel regimen to follow) Verdia Gone          Review of Systems   Constitutional: Negative  Respiratory: Negative  Cardiovascular: Negative  Gastrointestinal: Negative  Endocrine: Negative  Hematological: Negative  Current Outpatient Medications on File Prior to Visit   Medication Sig   • acetaminophen (TYLENOL) 500 mg tablet Take 2 tablets (1,000 mg total) by mouth every 8 (eight) hours for 7 days   • lisinopril (ZESTRIL) 5 mg tablet TAKE 1 TABLET (5 MG TOTAL) BY MOUTH DAILY     • Multiple Vitamin (multivitamin) tablet Take 1 tablet by mouth daily   • pantoprazole (PROTONIX) 40 mg tablet Take 1 tablet (40 mg total) by mouth daily   • rosuvastatin (CRESTOR) 5 mg tablet Take 1 tablet (5 mg total) by mouth daily (Patient taking differently: Take 5 mg by mouth daily at bedtime)   • [DISCONTINUED] oxyCODONE (Roxicodone) 5 immediate release tablet Take 1 tablet (5 mg total) by mouth every 4 (four) hours as needed for moderate pain Max Daily Amount: 30 mg (Patient not taking: Reported on 10/31/2022)       Objective     /70 Pulse 82   Temp 97 9 °F (36 6 °C)   Resp 16   Ht 5' 4" (1 626 m)   Wt 95 3 kg (210 lb)   SpO2 98%   BMI 36 05 kg/m²     Physical Exam  Constitutional:       Appearance: She is well-developed  Eyes:      Conjunctiva/sclera: Conjunctivae normal    Neck:      Thyroid: No thyromegaly  Vascular: No JVD  Cardiovascular:      Rate and Rhythm: Normal rate and regular rhythm  Heart sounds: Normal heart sounds  No murmur heard  No friction rub  No gallop  Pulmonary:      Effort: Pulmonary effort is normal       Breath sounds: Normal breath sounds  No wheezing or rales  Abdominal:      General: Bowel sounds are normal  There is no distension  Palpations: Abdomen is soft  Tenderness: There is no abdominal tenderness  Musculoskeletal:      Cervical back: Neck supple         Lori Grey MD

## 2022-10-31 NOTE — ASSESSMENT & PLAN NOTE
She is doing well  She has follow up appointment scheduled for her surgical follow up  She will continue her dietary restrictions and has follow up with nutritionist as well  Asked patient to return or call if she develops any orthostatic symptoms with continued weight loss; if so may need titration of lisinopril

## 2022-10-31 NOTE — PROGRESS NOTES
FIRST POST-OPERATIVE VISIT - BARIATRIC SURGERY  Winifred [place] 56 y o  female MRN: [id number]  Unit/Bed#:  Encounter: [id number]      HPI:  Janina Nichole is a 56 y o  female who presents for the 1st postoperative visit s/p Emily-En-Y Gastric Bypass with Dr Abhishek King on 10/24/22  Tolerating adequate PO intake, ambulating regularly, using IS, voiding, +F/BM  Denies n/v/cp/sob/dizziness    Review of Systems   Constitutional: Negative for chills and fever  Unexpected weight change: planned weight loss  HENT: Negative for trouble swallowing  Respiratory: Negative for cough and shortness of breath  Cardiovascular: Negative for chest pain and palpitations  Gastrointestinal: Positive for abdominal pain  Negative for constipation, diarrhea, nausea and vomiting  Skin: Positive for wound  Neurological: Negative for dizziness  Psychiatric/Behavioral:        Denies anxiety and depression       Historical Information   Past Medical History:   Diagnosis Date   • Abnormal Pap smear of cervix    • Blood clot in eye    • Colon polyp    • Depression    • Hyperlipidemia    • Hypertension    • Obesity (BMI 30-39  9)    • Pre-diabetes    • Restless legs      Past Surgical History:   Procedure Laterality Date   • COLONOSCOPY     • DENTAL IMPLANT     • DILATION AND CURETTAGE OF UTERUS     • EGD  04/15/2022   • CT COLONOSCOPY FLX DX W/COLLJ SPEC WHEN PFRMD N/A 3/12/2018    Procedure: COLONOSCOPY;  Surgeon: River López MD;  Location: [facility];   Service: Gastroenterology   • CT LAP GASTRIC BYPASS/EMILY-EN-Y N/A 10/24/2022    Procedure: LAPAROSCOPIC EMILY-EN-Y GASTRIC BYPASS AND INTRAOPERATIVE EGD;  Surgeon: Shubham Vega MD;  Location: [facility] OR;  Service: Bariatrics     Social History   Social History     Substance and Sexual Activity   Alcohol Use Not Currently     Social History     Substance and Sexual Activity   Drug Use No     Social History     Tobacco Use   Smoking Status Former Smoker   • Years: 30 00   • Quit date: 2015   • Years since quittin 3   Smokeless Tobacco Never Used   Tobacco Comment    quit 2015     Family History: non-contributory    Meds/Allergies   all medications and allergies reviewed  Allergies   Allergen Reactions   • Shrimp Flavor - Food Allergy Itching       Objective     Current Vitals:   Blood Pressure: 114/78 (22 1114)  Pulse: 82 (22 1114)  Height: 5' 4" (162 6 cm) (22 1114)  Weight - Scale: 94 kg (207 lb 3 2 oz) (22 1114)     Physical Exam  Vitals reviewed  Constitutional:       General: She is not in acute distress  Appearance: She is well-developed  HENT:      Head: Normocephalic and atraumatic  Eyes:      General: No scleral icterus  Cardiovascular:      Rate and Rhythm: Normal rate and regular rhythm  Pulmonary:      Effort: Pulmonary effort is normal  No respiratory distress  Abdominal:      General: There is no distension  Palpations: Abdomen is soft  Tenderness: There is no abdominal tenderness  Comments: Incisions C/D/I, no signs of infection   Skin:     General: Skin is warm and dry  Neurological:      Mental Status: She is alert and oriented to person, place, and time  Psychiatric:         Mood and Affect: Mood normal          Behavior: Behavior normal            Assessment/Plan :    Patient is presenting for the first postoperative visit, patient hospital stay was uneventful without any complications, patient is doing well, has no complaints, is taking vitamins as instructed, currently tolerating the blenderized diet, will advance to soft diet  Patient will also be meeting with our dietician today to review her vitamin and mineral supplements and also go over her diet and emphasize postoperative commitment and compliance  The patient was also instructed to start exercising on a regular basis  However, I recommended no heavy lifting, or weight exercises for another 2 weeks   F/U at the 5 week global class and 3 month appointment  Labs for the 3 month appointment were ordered  Patient was instructed to call if develops nausea, vomiting, fever or chills

## 2022-10-31 NOTE — ASSESSMENT & PLAN NOTE
Well controlled on lisinopril 5 mg  She denies orthostatic type symptoms and was asked to call if she develops these with further weight loss

## 2022-11-03 ENCOUNTER — OFFICE VISIT (OUTPATIENT)
Dept: BARIATRICS | Facility: CLINIC | Age: 57
End: 2022-11-03

## 2022-11-03 VITALS
WEIGHT: 207.2 LBS | DIASTOLIC BLOOD PRESSURE: 78 MMHG | BODY MASS INDEX: 35.37 KG/M2 | HEART RATE: 82 BPM | SYSTOLIC BLOOD PRESSURE: 114 MMHG | HEIGHT: 64 IN

## 2022-11-03 VITALS — HEIGHT: 64 IN | BODY MASS INDEX: 35.37 KG/M2 | WEIGHT: 207.2 LBS

## 2022-11-03 DIAGNOSIS — K91.2 POSTSURGICAL MALABSORPTION: ICD-10-CM

## 2022-11-03 DIAGNOSIS — R73.03 PREDIABETES: ICD-10-CM

## 2022-11-03 DIAGNOSIS — E66.9 OBESITY, CLASS II, BMI 35-39.9: ICD-10-CM

## 2022-11-03 DIAGNOSIS — Z48.815 ENCOUNTER FOR SURGICAL AFTERCARE FOLLOWING SURGERY OF DIGESTIVE SYSTEM: Primary | ICD-10-CM

## 2022-11-03 DIAGNOSIS — E78.2 MIXED HYPERLIPIDEMIA: ICD-10-CM

## 2022-11-03 DIAGNOSIS — K91.2 POSTSURGICAL MALABSORPTION: Primary | ICD-10-CM

## 2022-11-03 DIAGNOSIS — I10 PRIMARY HYPERTENSION: ICD-10-CM

## 2022-11-03 RX ORDER — LANOLIN ALCOHOL/MO/W.PET/CERES
CREAM (GRAM) TOPICAL
COMMUNITY

## 2022-11-03 NOTE — PROGRESS NOTES
Weight Management Nutrition Class     Diagnosis: Morbid Obesity    Bariatric Surgeon: Dr Rupali Philippe    Surgery: Gastric Bypass Laparoscopic    Class: first post op note    Topics discussed today include:     fluid goals post op, protein goals post op, constipation, chew food well, exercise, avoidance of alcohol, PPI use, diet progression, hypoglycemia, dumping syndrome, protein supplems, vitamin/mineral supplements and calcium supplements    Patient was able to verbalize basic diet (protein, fluid, vitamin and mineral) recommendations and possible nutrition-related complications  Yes     Pureed diet  B: cream of wheat with protein powder  L:  Ricotta with seasonings + 1 tbsp SF tomato sauce  D:  Mashed potatoes made with skim milk, broth, seasoning + protein powder    Protein: 1 Ensure max per day + protein powder in foods + protein foods  Pt is logging >60gm protien and 500-600cals per day    Fluids: At least 48oz of fluids with 11oz coming from the protein shake    Vitamins:  Bariatric Advantage 2 per day chewable, has the BA chewy calcium, will start today  Will take 3 per day spaced by 2hrs  For the past 3 days about 1hr after eating gets a sharp cramp on her left side  If she sits down for 10 mins it seems to pass  No N/V/D/C at this time   +soft BM each day for the past few days

## 2022-11-03 NOTE — PATIENT INSTRUCTIONS
GOALS:   Continued/Maintain healthy weight loss with good nutrition intakes  Adequate hydration with at least 64oz  Fluid intake    Normal vitamin and mineral levels - take vitamins and calcium as directed  Start slowly walking program  Slowly advance diet per RD  Avoid heavy lifting  Continue to take Omeprazole or Pantoprazole daily  Obtain post op labs before your 3 month appointment

## 2022-11-28 ENCOUNTER — CLINICAL SUPPORT (OUTPATIENT)
Dept: BARIATRICS | Facility: CLINIC | Age: 57
End: 2022-11-28

## 2022-11-28 VITALS — WEIGHT: 199 LBS | HEIGHT: 64 IN | BODY MASS INDEX: 33.97 KG/M2

## 2022-11-28 DIAGNOSIS — K91.2 POSTSURGICAL MALABSORPTION: Primary | ICD-10-CM

## 2022-11-28 NOTE — PROGRESS NOTES
Weight Management Nutrition Class --VIRTUAL    Diagnosis: Obesity    Bariatric Surgeon: Dr Tracee Smith    Surgery: Gastric Bypass Laparoscopic    Class: 5 week post op     Topics discussed today include:     fluid goals post op, protein goals post op, constipation, chew food well, exercise, avoidance of alcohol, PPI use, diet progression, hypoglycemia, dumping syndrome, protein supplems, vitamin/mineral supplements and calcium supplements    Patient was able to verbalize basic diet (protein, fluid, vitamin and mineral) recommendations and possible nutrition-related complications   Yes

## 2023-02-17 ENCOUNTER — TELEPHONE (OUTPATIENT)
Dept: BARIATRICS | Facility: CLINIC | Age: 58
End: 2023-02-17

## 2023-02-17 ENCOUNTER — OFFICE VISIT (OUTPATIENT)
Dept: BARIATRICS | Facility: CLINIC | Age: 58
End: 2023-02-17

## 2023-02-17 VITALS
SYSTOLIC BLOOD PRESSURE: 92 MMHG | HEIGHT: 64 IN | BODY MASS INDEX: 29.64 KG/M2 | WEIGHT: 173.6 LBS | DIASTOLIC BLOOD PRESSURE: 58 MMHG | HEART RATE: 74 BPM

## 2023-02-17 DIAGNOSIS — I10 PRIMARY HYPERTENSION: ICD-10-CM

## 2023-02-17 DIAGNOSIS — E88.81 METABOLIC SYNDROME: ICD-10-CM

## 2023-02-17 DIAGNOSIS — Z98.84 S/P GASTRIC BYPASS: ICD-10-CM

## 2023-02-17 DIAGNOSIS — Z48.815 ENCOUNTER FOR SURGICAL AFTERCARE FOLLOWING SURGERY ON THE DIGESTIVE SYSTEM: Primary | ICD-10-CM

## 2023-02-17 DIAGNOSIS — E78.2 MIXED HYPERLIPIDEMIA: ICD-10-CM

## 2023-02-17 DIAGNOSIS — K91.2 POSTSURGICAL MALABSORPTION: ICD-10-CM

## 2023-02-17 DIAGNOSIS — R73.03 PREDIABETES: ICD-10-CM

## 2023-02-17 NOTE — TELEPHONE ENCOUNTER
Spoke to pt  Relayed message from Carmen  Stated message will also show in her my chart  Pt expressed understanding

## 2023-02-17 NOTE — TELEPHONE ENCOUNTER
Please notify patient that labs from 02/13/23 show:     -Elevated B1 and B12 - safe and no changes needed    -Elevated ferritin - likely resolving fatty liver - we will trend on repeat labs     -ALT liver enzyme mildly elevated - avoid excessive Tylenol and avoid ETOH and discuss with PCP    -HDL good cholesterol low - increase exercise as able  -Avoid fried foods and trans fat, limit saturated fats and refined carbohydrates  -Increase fish/omega 3 FA consumption  -Increase physical activity

## 2023-02-17 NOTE — LETTER
February 17, 2023     Mel Larry MD  [address]  [address] [facility] [address]    Patient: Lynette Jean   YOB: 1965   Date of Visit: 2/17/2023       Dear Dr Cher Driscoll:    Thank you for referring Dominga Clements to me for metabolic and bariatric surgery  Below are my notes for this postoperative visit  If you have questions, please do not hesitate to call me  I look forward to following your patient along with you  Sincerely,        Joe Angulo MD        CC: No Recipients  Joe Angulo MD  2/17/2023  2:08 PM  Sign when Signing Visit  Progress Note - Bariatric Surgery   Winifred Carter 56 y o  female MRN: [id number]  Unit/Bed#:  Encounter: [id number]    Assessment/Plan:  57/F presents for 3 months visit s/p LRYGB; %EWL 69%    Mindful eating, stress reduction, sleep hygiene   Exercise  MVI/minerals  Obtain metabolic panel and RTO in 3 months PA-C  F/U with PCP about potentially discontinuing Lisinopril        Subjective/Objective      Subjective: She is doing well  She is happy with her weight loss  Greatly increased mobility  Adequate H20/Protein  +MVI/minerals  Denies GERD/dysphagia  To see PCP about discontinuing Lisinopril  Review of systems: Negative except as noted above    Objective:     Blood pressure 92/58, pulse 74, height 5' 4" (1 626 m), weight 78 7 kg (173 lb 9 6 oz)  ,Body mass index is 29 8 kg/m²          Invasive Devices     None                 Physical Exam:     No distress   EOMI   CN II-XII grossly intact  Neck ROM wnl   RRR  Breathing non labored   Abd flat, ND  Skin warm/dry  Msk ROM wnl   Thought content/behavior/mood wnl               Lab, Imaging and other studies: Labs pending

## 2023-02-17 NOTE — PROGRESS NOTES
Progress Note - Bariatric Surgery   Winifred Stark 56 y o  female MRN: [id number]  Unit/Bed#:  Encounter: [id number]    Assessment/Plan:  57/F presents for 3 months visit s/p LRYGB; %EWL 69%    Mindful eating, stress reduction, sleep hygiene   Exercise  MVI/minerals  Obtain metabolic panel and RTO in 3 months PA-C  F/U with PCP about potentially discontinuing Lisinopril        Subjective/Objective     Subjective: She is doing well  She is happy with her weight loss  Greatly increased mobility  Adequate H20/Protein  +MVI/minerals  Denies GERD/dysphagia  To see PCP about discontinuing Lisinopril  Review of systems: Negative except as noted above    Objective:     Blood pressure 92/58, pulse 74, height 5' 4" (1 626 m), weight 78 7 kg (173 lb 9 6 oz)  ,Body mass index is 29 8 kg/m²          Invasive Devices     None                 Physical Exam:     No distress   EOMI   CN II-XII grossly intact  Neck ROM wnl   RRR  Breathing non labored   Abd flat, ND  Skin warm/dry  Msk ROM wnl   Thought content/behavior/mood wnl               Lab, Imaging and other studies: Labs pending

## 2023-02-19 LAB
25(OH)D3+25(OH)D2 SERPL-MCNC: 59.2 NG/ML (ref 30–100)
ALBUMIN SERPL-MCNC: 4.4 G/DL (ref 3.8–4.9)
ALBUMIN/GLOB SERPL: 1.8 {RATIO} (ref 1.2–2.2)
ALP SERPL-CCNC: 62 IU/L (ref 44–121)
ALT SERPL-CCNC: 33 IU/L (ref 0–32)
AST SERPL-CCNC: 29 IU/L (ref 0–40)
BASOPHILS # BLD AUTO: 0.1 X10E3/UL (ref 0–0.2)
BASOPHILS NFR BLD AUTO: 1 %
BILIRUB SERPL-MCNC: 0.3 MG/DL (ref 0–1.2)
BUN SERPL-MCNC: 13 MG/DL (ref 6–24)
BUN/CREAT SERPL: 20 (ref 9–23)
CALCIUM SERPL-MCNC: 9.9 MG/DL (ref 8.7–10.2)
CHLORIDE SERPL-SCNC: 102 MMOL/L (ref 96–106)
CHOLEST SERPL-MCNC: 199 MG/DL (ref 100–199)
CO2 SERPL-SCNC: 22 MMOL/L (ref 20–29)
CREAT SERPL-MCNC: 0.65 MG/DL (ref 0.57–1)
EGFR: 104 ML/MIN/1.73
EOSINOPHIL # BLD AUTO: 0.1 X10E3/UL (ref 0–0.4)
EOSINOPHIL NFR BLD AUTO: 2 %
ERYTHROCYTE [DISTWIDTH] IN BLOOD BY AUTOMATED COUNT: 13.9 % (ref 11.7–15.4)
FERRITIN SERPL-MCNC: 301 NG/ML (ref 15–150)
FOLATE SERPL-MCNC: >20 NG/ML
GLOBULIN SER-MCNC: 2.5 G/DL (ref 1.5–4.5)
GLUCOSE SERPL-MCNC: 81 MG/DL (ref 70–99)
HBA1C MFR BLD: 5.5 % (ref 4.8–5.6)
HCT VFR BLD AUTO: 40.1 % (ref 34–46.6)
HDLC SERPL-MCNC: 39 MG/DL
HGB BLD-MCNC: 13.6 G/DL (ref 11.1–15.9)
IMM GRANULOCYTES # BLD: 0 X10E3/UL (ref 0–0.1)
IMM GRANULOCYTES NFR BLD: 0 %
IRON SATN MFR SERPL: 28 % (ref 15–55)
IRON SERPL-MCNC: 73 UG/DL (ref 27–159)
LDLC SERPL CALC-MCNC: 135 MG/DL (ref 0–99)
LYMPHOCYTES # BLD AUTO: 2.3 X10E3/UL (ref 0.7–3.1)
LYMPHOCYTES NFR BLD AUTO: 40 %
MCH RBC QN AUTO: 30.6 PG (ref 26.6–33)
MCHC RBC AUTO-ENTMCNC: 33.9 G/DL (ref 31.5–35.7)
MCV RBC AUTO: 90 FL (ref 79–97)
MONOCYTES # BLD AUTO: 0.5 X10E3/UL (ref 0.1–0.9)
MONOCYTES NFR BLD AUTO: 9 %
NEUTROPHILS # BLD AUTO: 2.9 X10E3/UL (ref 1.4–7)
NEUTROPHILS NFR BLD AUTO: 48 %
PLATELET # BLD AUTO: 391 X10E3/UL (ref 150–450)
POTASSIUM SERPL-SCNC: 4.1 MMOL/L (ref 3.5–5.2)
PROT SERPL-MCNC: 6.9 G/DL (ref 6–8.5)
PTH-INTACT SERPL-MCNC: 17 PG/ML (ref 15–65)
RBC # BLD AUTO: 4.45 X10E6/UL (ref 3.77–5.28)
SL AMB VLDL CHOLESTEROL CALC: 25 MG/DL (ref 5–40)
SODIUM SERPL-SCNC: 140 MMOL/L (ref 134–144)
TIBC SERPL-MCNC: 257 UG/DL (ref 250–450)
TRIGL SERPL-MCNC: 137 MG/DL (ref 0–149)
UIBC SERPL-MCNC: 184 UG/DL (ref 131–425)
VIT A SERPL-MCNC: 40.4 UG/DL (ref 20.1–62)
VIT B1 BLD-SCNC: 201.1 NMOL/L (ref 66.5–200)
VIT B12 SERPL-MCNC: >2000 PG/ML (ref 232–1245)
WBC # BLD AUTO: 5.9 X10E3/UL (ref 3.4–10.8)
ZINC BLD-MCNC: 636 UG/DL (ref 440–860)

## 2023-02-28 ENCOUNTER — VBI (OUTPATIENT)
Dept: ADMINISTRATIVE | Facility: OTHER | Age: 58
End: 2023-02-28

## 2023-03-26 DIAGNOSIS — E78.2 MIXED HYPERLIPIDEMIA: ICD-10-CM

## 2023-03-27 RX ORDER — ROSUVASTATIN CALCIUM 5 MG/1
5 TABLET, COATED ORAL DAILY
Qty: 90 TABLET | Refills: 1 | Status: SHIPPED | OUTPATIENT
Start: 2023-03-27

## 2023-04-24 ENCOUNTER — RA CDI HCC (OUTPATIENT)
Dept: OTHER | Facility: HOSPITAL | Age: 58
End: 2023-04-24

## 2023-04-24 NOTE — PROGRESS NOTES
[facility]  coding opportunities       Chart reviewed, no opportunity found: CHART REVIEWED, NO OPPORTUNITY FOUND        Patients Insurance        Commercial Insurance: Jennifer Henry

## 2023-05-22 NOTE — ASSESSMENT & PLAN NOTE
-s/p Ailin-En-Y Gastric Bypass with Dr Jennifer Kendall on 10/24/22  Overall doing very well  EWL on schedule for expected post surgical weight loss at this time  Increase water and protein and f/u with RD prn  Initial: 234 4lbs  Current: 150 2lbs  EWL: 96%  Saw: current  Current BMI is Body mass index is 25 78 kg/m²  · Tolerating a regular diet-yes  · Eating at least 60 grams of protein per day-roughly, advised her to increase  · Following 30/60 minute rule with liquids-yes  · Drinking at least 64 ounces of fluid per day-no  · Drinking carbonated beverages-no  · Sufficient exercise-yes, walking, strength training, gym  · Using NSAIDs regularly-no  · Using nicotine-no  · Using alcohol-no   Advised about the risks of alcohol s/p bariatric surgery and recommend avoiding all alcohol

## 2023-05-22 NOTE — ASSESSMENT & PLAN NOTE
-At risk for malabsorption of vitamins/minerals secondary to malabsorption and restriction of intake from bariatric surgery  -Currently taking adequate postop bariatric surgery vitamin supplementation: Bariatric MVI w/ 45mg iron, calcium citrate 500mg TID    -CBC/Metabolic panel 64/99/05- elevated ferritin but trending down - likely resolving fatty liver   Calcium high normal - reduce calcium to BID    -Repeat labs in 6 months    -Patient received education about the importance of adhering to a lifelong supplementation regimen to avoid vitamin/mineral deficiencies

## 2023-05-25 LAB
25(OH)D3+25(OH)D2 SERPL-MCNC: 64.3 NG/ML (ref 30–100)
ALBUMIN SERPL-MCNC: 4.2 G/DL (ref 3.8–4.9)
ALBUMIN/GLOB SERPL: 1.8 {RATIO} (ref 1.2–2.2)
ALP SERPL-CCNC: 54 IU/L (ref 44–121)
ALT SERPL-CCNC: 20 IU/L (ref 0–32)
AST SERPL-CCNC: 17 IU/L (ref 0–40)
BASOPHILS # BLD AUTO: 0 X10E3/UL (ref 0–0.2)
BASOPHILS NFR BLD AUTO: 1 %
BILIRUB SERPL-MCNC: 0.3 MG/DL (ref 0–1.2)
BUN SERPL-MCNC: 13 MG/DL (ref 6–24)
BUN/CREAT SERPL: 21 (ref 9–23)
CALCIUM SERPL-MCNC: 10 MG/DL (ref 8.7–10.2)
CHLORIDE SERPL-SCNC: 106 MMOL/L (ref 96–106)
CHOLEST SERPL-MCNC: 194 MG/DL (ref 100–199)
CO2 SERPL-SCNC: 24 MMOL/L (ref 20–29)
CREAT SERPL-MCNC: 0.62 MG/DL (ref 0.57–1)
EGFR: 105 ML/MIN/1.73
EOSINOPHIL # BLD AUTO: 0.1 X10E3/UL (ref 0–0.4)
EOSINOPHIL NFR BLD AUTO: 3 %
ERYTHROCYTE [DISTWIDTH] IN BLOOD BY AUTOMATED COUNT: 13.6 % (ref 11.7–15.4)
FERRITIN SERPL-MCNC: 290 NG/ML (ref 15–150)
FOLATE SERPL-MCNC: >20 NG/ML
GLOBULIN SER-MCNC: 2.3 G/DL (ref 1.5–4.5)
GLUCOSE SERPL-MCNC: 80 MG/DL (ref 70–99)
HBA1C MFR BLD: 5.6 % (ref 4.8–5.6)
HCT VFR BLD AUTO: 40.6 % (ref 34–46.6)
HDLC SERPL-MCNC: 44 MG/DL
HGB BLD-MCNC: 12.8 G/DL (ref 11.1–15.9)
IMM GRANULOCYTES # BLD: 0 X10E3/UL (ref 0–0.1)
IMM GRANULOCYTES NFR BLD: 0 %
IRON SATN MFR SERPL: 33 % (ref 15–55)
IRON SERPL-MCNC: 84 UG/DL (ref 27–159)
LDLC SERPL CALC-MCNC: 129 MG/DL (ref 0–99)
LYMPHOCYTES # BLD AUTO: 2.1 X10E3/UL (ref 0.7–3.1)
LYMPHOCYTES NFR BLD AUTO: 45 %
MCH RBC QN AUTO: 30.3 PG (ref 26.6–33)
MCHC RBC AUTO-ENTMCNC: 31.5 G/DL (ref 31.5–35.7)
MCV RBC AUTO: 96 FL (ref 79–97)
MONOCYTES # BLD AUTO: 0.3 X10E3/UL (ref 0.1–0.9)
MONOCYTES NFR BLD AUTO: 7 %
NEUTROPHILS # BLD AUTO: 2 X10E3/UL (ref 1.4–7)
NEUTROPHILS NFR BLD AUTO: 44 %
PLATELET # BLD AUTO: 380 X10E3/UL (ref 150–450)
POTASSIUM SERPL-SCNC: 4.7 MMOL/L (ref 3.5–5.2)
PROT SERPL-MCNC: 6.5 G/DL (ref 6–8.5)
PTH-INTACT SERPL-MCNC: 13 PG/ML (ref 15–65)
RBC # BLD AUTO: 4.22 X10E6/UL (ref 3.77–5.28)
SL AMB VLDL CHOLESTEROL CALC: 21 MG/DL (ref 5–40)
SODIUM SERPL-SCNC: 143 MMOL/L (ref 134–144)
TIBC SERPL-MCNC: 252 UG/DL (ref 250–450)
TRIGL SERPL-MCNC: 114 MG/DL (ref 0–149)
UIBC SERPL-MCNC: 168 UG/DL (ref 131–425)
VIT A SERPL-MCNC: 39.4 UG/DL (ref 20.1–62)
VIT B1 BLD-SCNC: 178 NMOL/L (ref 66.5–200)
VIT B12 SERPL-MCNC: >2000 PG/ML (ref 232–1245)
WBC # BLD AUTO: 4.5 X10E3/UL (ref 3.4–10.8)
ZINC BLD-MCNC: 502 UG/DL (ref 440–860)

## 2023-05-26 ENCOUNTER — OFFICE VISIT (OUTPATIENT)
Dept: BARIATRICS | Facility: CLINIC | Age: 58
End: 2023-05-26

## 2023-05-26 VITALS
HEART RATE: 70 BPM | BODY MASS INDEX: 25.64 KG/M2 | SYSTOLIC BLOOD PRESSURE: 110 MMHG | WEIGHT: 150.2 LBS | HEIGHT: 64 IN | DIASTOLIC BLOOD PRESSURE: 74 MMHG

## 2023-05-26 DIAGNOSIS — Z48.815 ENCOUNTER FOR SURGICAL AFTERCARE FOLLOWING SURGERY OF DIGESTIVE SYSTEM: Primary | ICD-10-CM

## 2023-05-26 DIAGNOSIS — R79.89 HIGH SERUM FERRITIN: ICD-10-CM

## 2023-05-26 DIAGNOSIS — K91.2 POSTSURGICAL MALABSORPTION: ICD-10-CM

## 2023-05-26 DIAGNOSIS — E78.2 MIXED HYPERLIPIDEMIA: ICD-10-CM

## 2023-05-26 DIAGNOSIS — I10 PRIMARY HYPERTENSION: ICD-10-CM

## 2023-05-26 NOTE — PROGRESS NOTES
Assessment/Plan:    Encounter for surgical aftercare following surgery of digestive system  -s/p Ailin-En-Y Gastric Bypass with Dr Tello Other on 10/24/22  Overall doing very well  EWL on schedule for expected post surgical weight loss at this time  Increase water and protein and f/u with RD prn  Initial: 234 4lbs  Current: 150 2lbs  EWL: 96%  Saw: current  Current BMI is Body mass index is 25 78 kg/m²  · Tolerating a regular diet-yes  · Eating at least 60 grams of protein per day-roughly, advised her to increase  · Following 30/60 minute rule with liquids-yes  · Drinking at least 64 ounces of fluid per day-no  · Drinking carbonated beverages-no  · Sufficient exercise-yes, walking, strength training, gym  · Using NSAIDs regularly-no  · Using nicotine-no  · Using alcohol-no  Advised about the risks of alcohol s/p bariatric surgery and recommend avoiding all alcohol      Postsurgical malabsorption  -At risk for malabsorption of vitamins/minerals secondary to malabsorption and restriction of intake from bariatric surgery  -Currently taking adequate postop bariatric surgery vitamin supplementation: Bariatric MVI w/ 45mg iron, calcium citrate 500mg TID    -CBC/Metabolic panel 39/10/83- elevated ferritin but trending down - likely resolving fatty liver   Calcium high normal - reduce calcium to BID    -Repeat labs in 6 months    -Patient received education about the importance of adhering to a lifelong supplementation regimen to avoid vitamin/mineral deficiencies       Primary hypertension  -on lisinopril   -Continue monitoring and management with prescribing provider      Mixed hyperlipidemia  -Avoid fried foods and trans fat, limit saturated fats and refined carbohydrates  -Increase fish/omega 3 FA consumption  -Increase physical activity  -obtain lipid panel       Diagnoses and all orders for this visit:    Encounter for surgical aftercare following surgery of digestive system    Postsurgical malabsorption  -     CBC and differential; Future  -     Comprehensive metabolic panel; Future  -     Folate; Future  -     Hemoglobin A1C; Future  -     Iron Panel (Includes Ferritin, Iron Sat%, Iron, and TIBC); Future  -     Zinc; Future  -     Vitamin D 25 hydroxy; Future  -     Vitamin B12; Future  -     Vitamin B1, whole blood; Future  -     Vitamin A; Future  -     PTH, intact; Future  -     Lipid panel; Future    Primary hypertension    Mixed hyperlipidemia  -     Lipid panel; Future    High serum ferritin  -     Iron Panel (Includes Ferritin, Iron Sat%, Iron, and TIBC); Future          Subjective:      Patient ID: Landy Schaffer is a 56 y o  female  -s/p Ailin-En-Y Gastric Bypass with Dr Nicole Mckeon on 10/24/22  Presents to the office today for routine follow up  Tolerating diet without issues; denies N/V, dysphagia, reflux  Overall doing very well  Feeling great, no complaints - better mobility and joints feel better  BM daily but sometimes constipation- uses benefiber  Works at [employer]  Diet Recall: (tracks and logs - 1400kcals, 59g protein)  B - Greek yogurt w/ apple or protein shake (ensure Max) , 1/4 multigrain bagel w/ cream cheese or rice cake w/ PB  Snack - Greek yogurt and fruit  L - green salad w/ roasted chicken and veggies w/ oil and vinegar or soup or veggie chili  Snack - veggies and chicken  D - green salad and veggies and lean protein or meatball and veggies    Fluids - 48oz water, 2 cups coffee      The following portions of the patient's history were reviewed and updated as appropriate: allergies, current medications, past family history, past medical history, past social history, past surgical history and problem list     Review of Systems   Constitutional: Negative for chills and fever  Unexpected weight change: planned weight loss  HENT: Negative for trouble swallowing  Respiratory: Negative for cough and shortness of breath  Cardiovascular: Negative for chest pain and palpitations     Gastrointestinal: "Negative for abdominal pain, constipation, diarrhea, nausea and vomiting  Neurological: Negative for dizziness  Psychiatric/Behavioral:        Denies anxiety and depression         Objective:      /74   Pulse 70   Ht 5' 4\" (1 626 m)   Wt 68 1 kg (150 lb 3 2 oz)   BMI 25 78 kg/m²     Colonoscopy-Completed       Physical Exam  Vitals reviewed  Constitutional:       General: She is not in acute distress  Appearance: She is well-developed  HENT:      Head: Normocephalic and atraumatic  Eyes:      General: No scleral icterus  Cardiovascular:      Rate and Rhythm: Normal rate and regular rhythm  Pulmonary:      Effort: Pulmonary effort is normal  No respiratory distress  Abdominal:      General: There is no distension  Palpations: Abdomen is soft  Tenderness: There is no abdominal tenderness  Skin:     General: Skin is warm and dry  Neurological:      Mental Status: She is alert and oriented to person, place, and time  Psychiatric:         Mood and Affect: Mood normal          Behavior: Behavior normal            BARRIERS: none identified    GOALS:   · Continued/Maintain healthy weight loss with good nutrition intakes  · Adequate hydration with at least 64oz  fluid intake  · Normal vitamin and mineral levels  · Exercise as tolerated  · Follow-up in 6 months  We kindly ask that your arrive 15 minutes before your scheduled appointment time with your provider to allow our staff to room you, get your vital signs and update your chart  · Follow diet as discussed  · Get lab work done in 6 months  You have been given a lab slip today  Please call the office if you need a replacement  It is recommended to check with your insurance BEFORE getting labs done to make sure they are covered by your policy  Also, please check with your PCP and other providers before getting labs to avoid duplicate labs   Make sure to HOLD any multivitamins that may contain biotin and any " biotin supplements FOR 5 DAYS before any labs since it can affect the results  · Follow vitamin and mineral recommendations as reviewed with you  · Call our office if you have any problems with abdominal pain especially associated with fever, chills, nausea, vomiting or any other concerns  · All  Post-bariatric surgery patients should be aware that very small quantities of any alcohol can cause impairment and it is very possible not to feel the effect  The effect can be in the system for several hours  It is also a stomach irritant  · It is advised to AVOID alcohol, Nonsteroidal antiinflammatory drugs (NSAIDS) and nicotine of all forms   Any of these can cause stomach irritation/pain

## 2023-05-26 NOTE — PATIENT INSTRUCTIONS
GOALS:   Continued/Maintain healthy weight loss with good nutrition intakes  Adequate hydration with at least 64oz  fluid intake  Normal vitamin and mineral levels  Exercise as tolerated  Follow-up in 6 months  We kindly ask that your arrive 15 minutes before your scheduled appointment time with your provider to allow our staff to room you, get your vital signs and update your chart  Follow diet as discussed  Get lab work done in 6 months  You have been given a lab slip today  Please call the office if you need a replacement  It is recommended to check with your insurance BEFORE getting labs done to make sure they are covered by your policy  Also, please check with your PCP and other providers before getting labs to avoid duplicate labs  Make sure to HOLD any multivitamins that may contain biotin and any biotin supplements FOR 5 DAYS before any labs since it can affect the results  Follow vitamin and mineral recommendations as reviewed with you  Call our office if you have any problems with abdominal pain especially associated with fever, chills, nausea, vomiting or any other concerns  All  Post-bariatric surgery patients should be aware that very small quantities of any alcohol can cause impairment and it is very possible not to feel the effect  The effect can be in the system for several hours  It is also a stomach irritant  It is advised to AVOID alcohol, Nonsteroidal antiinflammatory drugs (NSAIDS) and nicotine of all forms   Any of these can cause stomach irritation/pain

## 2023-06-01 ENCOUNTER — OFFICE VISIT (OUTPATIENT)
Dept: FAMILY MEDICINE CLINIC | Facility: CLINIC | Age: 58
End: 2023-06-01

## 2023-06-01 VITALS
DIASTOLIC BLOOD PRESSURE: 82 MMHG | HEART RATE: 69 BPM | HEIGHT: 64 IN | SYSTOLIC BLOOD PRESSURE: 124 MMHG | RESPIRATION RATE: 17 BRPM | BODY MASS INDEX: 25.61 KG/M2 | WEIGHT: 150 LBS | TEMPERATURE: 98.3 F

## 2023-06-01 DIAGNOSIS — I10 PRIMARY HYPERTENSION: ICD-10-CM

## 2023-06-01 DIAGNOSIS — G25.81 RESTLESS LEG SYNDROME: Primary | ICD-10-CM

## 2023-06-01 RX ORDER — PRAMIPEXOLE DIHYDROCHLORIDE 0.12 MG/1
0.12 TABLET ORAL
Qty: 30 TABLET | Refills: 3 | Status: SHIPPED | OUTPATIENT
Start: 2023-06-01

## 2023-06-01 NOTE — PROGRESS NOTES
"Name: Zach Oneal      : 1965      MRN: [id number]  Encounter Provider: Carrie Haley MD  Encounter Date: 2023   Encounter department: [address]     1  Restless leg syndrome  Assessment & Plan: This is worsening  Will try to treat with low dose pramipexole  Possible side effects were discussed  Asked patient to contact me or return for any symptoms that are not relieved so we can titrate medication if necessary  Orders:  -     pramipexole (MIRAPEX) 0 125 mg tablet; Take 1 tablet (0 125 mg total) by mouth daily at bedtime    2  Primary hypertension  Assessment & Plan:  Now normal off of lisinopril and s/p bariatric surgery  Continue exercise and will continue to monitor  Subjective      Here for follow up visit  Had bariatric surgery in October and is down about 80 pounds  Now off statin and antihypertensive  Exercises daily  Feels wonderful  Denies chest pain, dyspnea, N/V  We reviewed labs from bariatric surgeon  She is having continued restless leg symptoms, which are keeping her up every night  Started almost a year ago  No meds tried  Iron levels on last labwork were okay  Review of Systems   Constitutional: Negative  Respiratory: Negative  Cardiovascular: Negative  Endocrine: Negative  Musculoskeletal:        RLS symptoms  Current Outpatient Medications on File Prior to Visit   Medication Sig   • Calcium 500-2 5 MG-MCG CHEW Chew   • Multiple Vitamin (multivitamin) tablet Take 1 tablet by mouth daily   • [DISCONTINUED] lisinopril (ZESTRIL) 5 mg tablet TAKE 1 TABLET (5 MG TOTAL) BY MOUTH DAILY  (Patient not taking: Reported on 2023)   • [DISCONTINUED] rosuvastatin (CRESTOR) 5 mg tablet TAKE 1 TABLET (5 MG TOTAL) BY MOUTH DAILY   (Patient not taking: Reported on 2023)       Objective     /82   Pulse 69   Temp 98 3 °F (36 8 °C)   Resp 17   Ht 5' 4\" (1 626 m)   Wt 68 kg (150 lb)   BMI 25 75 kg/m² " Physical Exam  Constitutional:       Appearance: She is well-developed  Eyes:      Conjunctiva/sclera: Conjunctivae normal    Neck:      Thyroid: No thyromegaly  Vascular: No JVD  Cardiovascular:      Rate and Rhythm: Normal rate and regular rhythm  Heart sounds: Normal heart sounds  No murmur heard  No friction rub  No gallop  Pulmonary:      Effort: Pulmonary effort is normal       Breath sounds: Normal breath sounds  No wheezing or rales  Abdominal:      General: Bowel sounds are normal  There is no distension  Palpations: Abdomen is soft  Tenderness: There is no abdominal tenderness  Musculoskeletal:      Cervical back: Neck supple  Neurological:      General: No focal deficit present  Mental Status: Mental status is at baseline  Cranial Nerves: No cranial nerve deficit  Motor: No weakness        Coordination: Coordination normal       Deep Tendon Reflexes: Reflexes normal        Devi Miller MD

## 2023-06-01 NOTE — ASSESSMENT & PLAN NOTE
This is worsening  Will try to treat with low dose pramipexole  Possible side effects were discussed  Asked patient to contact me or return for any symptoms that are not relieved so we can titrate medication if necessary

## 2023-06-01 NOTE — ASSESSMENT & PLAN NOTE
Now normal off of lisinopril and s/p bariatric surgery  Continue exercise and will continue to monitor

## 2023-06-09 ENCOUNTER — HOSPITAL ENCOUNTER (OUTPATIENT)
Dept: RADIOLOGY | Facility: HOSPITAL | Age: 58
Discharge: HOME | End: 2023-06-09
Payer: COMMERCIAL

## 2023-06-09 DIAGNOSIS — Z12.31 ENCOUNTER FOR SCREENING MAMMOGRAM FOR MALIGNANT NEOPLASM OF BREAST: ICD-10-CM

## 2023-06-09 PROCEDURE — 77067 SCR MAMMO BI INCL CAD: CPT

## 2023-06-09 PROCEDURE — 77063 BREAST TOMOSYNTHESIS BI: CPT

## 2023-06-25 DIAGNOSIS — G25.81 RESTLESS LEG SYNDROME: ICD-10-CM

## 2023-06-26 RX ORDER — PRAMIPEXOLE DIHYDROCHLORIDE 0.12 MG/1
0.12 TABLET ORAL
Qty: 90 TABLET | Refills: 1 | Status: SHIPPED | OUTPATIENT
Start: 2023-06-26

## 2023-09-01 DIAGNOSIS — G25.81 RESTLESS LEG SYNDROME: ICD-10-CM

## 2023-09-01 RX ORDER — PRAMIPEXOLE DIHYDROCHLORIDE 0.12 MG/1
0.12 TABLET ORAL
Qty: 90 TABLET | Refills: 0 | Status: SHIPPED | OUTPATIENT
Start: 2023-09-01

## 2023-10-11 ENCOUNTER — PATIENT MESSAGE (OUTPATIENT)
Dept: BARIATRICS | Facility: CLINIC | Age: 58
End: 2023-10-11

## 2023-10-11 ENCOUNTER — TELEPHONE (OUTPATIENT)
Dept: BARIATRICS | Facility: CLINIC | Age: 58
End: 2023-10-11

## 2023-10-11 LAB
25(OH)D3+25(OH)D2 SERPL-MCNC: 67.7 NG/ML (ref 30–100)
ALBUMIN SERPL-MCNC: 4.2 G/DL (ref 3.8–4.9)
ALBUMIN/GLOB SERPL: 1.8 {RATIO} (ref 1.2–2.2)
ALP SERPL-CCNC: 51 IU/L (ref 44–121)
ALT SERPL-CCNC: 21 IU/L (ref 0–32)
AST SERPL-CCNC: 22 IU/L (ref 0–40)
BASOPHILS # BLD AUTO: 0 X10E3/UL (ref 0–0.2)
BASOPHILS NFR BLD AUTO: 1 %
BILIRUB SERPL-MCNC: 0.2 MG/DL (ref 0–1.2)
BUN SERPL-MCNC: 13 MG/DL (ref 6–24)
BUN/CREAT SERPL: 20 (ref 9–23)
CALCIUM SERPL-MCNC: 9.8 MG/DL (ref 8.7–10.2)
CHLORIDE SERPL-SCNC: 102 MMOL/L (ref 96–106)
CHOLEST SERPL-MCNC: 188 MG/DL (ref 100–199)
CO2 SERPL-SCNC: 26 MMOL/L (ref 20–29)
CREAT SERPL-MCNC: 0.65 MG/DL (ref 0.57–1)
EGFR: 103 ML/MIN/1.73
EOSINOPHIL # BLD AUTO: 0.1 X10E3/UL (ref 0–0.4)
EOSINOPHIL NFR BLD AUTO: 2 %
ERYTHROCYTE [DISTWIDTH] IN BLOOD BY AUTOMATED COUNT: 12.5 % (ref 11.7–15.4)
FERRITIN SERPL-MCNC: 295 NG/ML (ref 15–150)
FOLATE SERPL-MCNC: >20 NG/ML
GLOBULIN SER-MCNC: 2.3 G/DL (ref 1.5–4.5)
GLUCOSE SERPL-MCNC: 86 MG/DL (ref 70–99)
HBA1C MFR BLD: 5.6 % (ref 4.8–5.6)
HCT VFR BLD AUTO: 38.2 % (ref 34–46.6)
HDLC SERPL-MCNC: 52 MG/DL
HGB BLD-MCNC: 12.6 G/DL (ref 11.1–15.9)
IMM GRANULOCYTES # BLD: 0 X10E3/UL (ref 0–0.1)
IMM GRANULOCYTES NFR BLD: 0 %
IRON SATN MFR SERPL: 27 % (ref 15–55)
IRON SERPL-MCNC: 70 UG/DL (ref 27–159)
LDLC SERPL CALC-MCNC: 117 MG/DL (ref 0–99)
LYMPHOCYTES # BLD AUTO: 1.7 X10E3/UL (ref 0.7–3.1)
LYMPHOCYTES NFR BLD AUTO: 40 %
MCH RBC QN AUTO: 30.8 PG (ref 26.6–33)
MCHC RBC AUTO-ENTMCNC: 33 G/DL (ref 31.5–35.7)
MCV RBC AUTO: 93 FL (ref 79–97)
MONOCYTES # BLD AUTO: 0.3 X10E3/UL (ref 0.1–0.9)
MONOCYTES NFR BLD AUTO: 8 %
NEUTROPHILS # BLD AUTO: 2 X10E3/UL (ref 1.4–7)
NEUTROPHILS NFR BLD AUTO: 49 %
PLATELET # BLD AUTO: 331 X10E3/UL (ref 150–450)
POTASSIUM SERPL-SCNC: 4.6 MMOL/L (ref 3.5–5.2)
PROT SERPL-MCNC: 6.5 G/DL (ref 6–8.5)
PTH-INTACT SERPL-MCNC: 12 PG/ML (ref 15–65)
RBC # BLD AUTO: 4.09 X10E6/UL (ref 3.77–5.28)
SL AMB VLDL CHOLESTEROL CALC: 19 MG/DL (ref 5–40)
SODIUM SERPL-SCNC: 140 MMOL/L (ref 134–144)
TIBC SERPL-MCNC: 262 UG/DL (ref 250–450)
TRIGL SERPL-MCNC: 107 MG/DL (ref 0–149)
UIBC SERPL-MCNC: 192 UG/DL (ref 131–425)
VIT A SERPL-MCNC: 38.1 UG/DL (ref 20.1–62)
VIT B1 BLD-SCNC: 183.9 NMOL/L (ref 66.5–200)
VIT B12 SERPL-MCNC: >2000 PG/ML (ref 232–1245)
WBC # BLD AUTO: 4.1 X10E3/UL (ref 3.4–10.8)
ZINC BLD-MCNC: 563 UG/DL (ref 440–860)

## 2023-10-11 NOTE — RESULT ENCOUNTER NOTE
Please advise her of labs from 10/06/23:    -Ferritin still elevated/trending up slightly - needs to discuss with PCP    -B12 elevated - may reduce B12 outside of [facility] to only 3x/week    Thank you

## 2023-10-11 NOTE — TELEPHONE ENCOUNTER
----- Message from Artur Walton PA-C sent at 10/11/2023  9:48 AM EDT -----  Please advise her of labs from 10/06/23:    -Ferritin still elevated/trending up slightly - needs to discuss with PCP    -B12 elevated - may reduce B12 outside of [facility] to only 3x/week    Thank you

## 2023-10-13 NOTE — PATIENT COMMUNICATION
Called patient 10/13/2023 (1st message left 10/11/2023) regarding her blood work results. Patient said She is on vacation and when she get back she will check her messages and also will talk with provider on her coming appointment.

## 2023-10-23 NOTE — ASSESSMENT & PLAN NOTE
-At risk for malabsorption of vitamins/minerals secondary to malabsorption and restriction of intake from bariatric surgery  -Currently taking adequate postop bariatric surgery vitamin supplementation: Bariatric MVI w/ 45mg iron, calcium citrate 500mg BID    -CBC/Metabolic panel 11/76/47:  -Ferritin still elevated/trending up slightly - needs to discuss with PCP  -B12 elevated - may reduce B12 outside of [facility] to only 3x/week    -Repeat labs ordered for 1 year    -Patient received education about the importance of adhering to a lifelong supplementation regimen to avoid vitamin/mineral deficiencies

## 2023-10-23 NOTE — ASSESSMENT & PLAN NOTE
-Avoid fried foods and trans fat, limit saturated fats and refined carbohydrates  -Increase fish/omega 3 FA consumption  -Increase physical activity  -LDL improving

## 2023-10-23 NOTE — ASSESSMENT & PLAN NOTE
-s/p Ailin-En-Y Gastric Bypass with Dr. Cynthia Rodriguez on 10/24/22. Overall doing very well. EWL on schedule for expected post surgical weight loss at this time. Increase water and f/u with RD and LCSW prn. Recommend body composition testing and REE testing    Initial: 234.4lbs  Current: 133lbs  EWL: 115%  Saw: current  Current BMI is Body mass index is 22.83 kg/m². Tolerating a regular diet-yes  Eating at least 60 grams of protein per day-yes  Following 30/60 minute rule with liquids-yes  Drinking at least 64 ounces of fluid per day-no  Drinking carbonated beverages-no  Sufficient exercise-yes, walking, strength training, gym  Using NSAIDs regularly-no  Using nicotine-no  Using alcohol-no.  Advised about the risks of alcohol s/p bariatric surgery and recommend avoiding all alcohol

## 2023-10-27 ENCOUNTER — OFFICE VISIT (OUTPATIENT)
Dept: BARIATRICS | Facility: CLINIC | Age: 58
End: 2023-10-27
Payer: COMMERCIAL

## 2023-10-27 VITALS
SYSTOLIC BLOOD PRESSURE: 118 MMHG | HEIGHT: 64 IN | WEIGHT: 133 LBS | DIASTOLIC BLOOD PRESSURE: 84 MMHG | BODY MASS INDEX: 22.71 KG/M2 | HEART RATE: 63 BPM

## 2023-10-27 DIAGNOSIS — R79.89 HIGH SERUM FERRITIN: ICD-10-CM

## 2023-10-27 DIAGNOSIS — E78.2 MIXED HYPERLIPIDEMIA: ICD-10-CM

## 2023-10-27 DIAGNOSIS — Z48.815 ENCOUNTER FOR SURGICAL AFTERCARE FOLLOWING SURGERY OF DIGESTIVE SYSTEM: Primary | ICD-10-CM

## 2023-10-27 DIAGNOSIS — K91.2 POSTSURGICAL MALABSORPTION: ICD-10-CM

## 2023-10-27 PROCEDURE — 99214 OFFICE O/P EST MOD 30 MIN: CPT | Performed by: PHYSICIAN ASSISTANT

## 2023-10-27 NOTE — PROGRESS NOTES
Assessment/Plan:    Encounter for surgical aftercare following surgery of digestive system  -s/p Ailin-En-Y Gastric Bypass with Dr. Sophia Trivedi on 10/24/22. Overall doing very well. EWL on schedule for expected post surgical weight loss at this time. Increase water and f/u with RD and LCSW prn. Recommend body composition testing and REE testing    Initial: 234.4lbs  Current: 133lbs  EWL: 115%  Saw: current  Current BMI is Body mass index is 22.83 kg/m². Tolerating a regular diet-yes  Eating at least 60 grams of protein per day-yes  Following 30/60 minute rule with liquids-yes  Drinking at least 64 ounces of fluid per day-no  Drinking carbonated beverages-no  Sufficient exercise-yes, walking, strength training, gym  Using NSAIDs regularly-no  Using nicotine-no  Using alcohol-no. Advised about the risks of alcohol s/p bariatric surgery and recommend avoiding all alcohol      Postsurgical malabsorption  -At risk for malabsorption of vitamins/minerals secondary to malabsorption and restriction of intake from bariatric surgery  -Currently taking adequate postop bariatric surgery vitamin supplementation: Bariatric MVI w/ 45mg iron, calcium citrate 500mg BID    -CBC/Metabolic panel 49/21/22:  -Ferritin still elevated/trending up slightly - needs to discuss with PCP  -B12 elevated - may reduce B12 outside of [facility] to only 3x/week    -Repeat labs ordered for 1 year    -Patient received education about the importance of adhering to a lifelong supplementation regimen to avoid vitamin/mineral deficiencies       Mixed hyperlipidemia  -Avoid fried foods and trans fat, limit saturated fats and refined carbohydrates  -Increase fish/omega 3 FA consumption  -Increase physical activity  -LDL improving      Diagnoses and all orders for this visit:    Encounter for surgical aftercare following surgery of digestive system    Postsurgical malabsorption  -     CBC and differential; Future  -     Comprehensive metabolic panel;  Future  - Folate; Future  -     Hemoglobin A1C; Future  -     Iron Panel (Includes Ferritin, Iron Sat%, Iron, and TIBC); Future  -     Zinc; Future  -     Vitamin D 25 hydroxy; Future  -     Vitamin B12; Future  -     Vitamin B1, whole blood; Future  -     Vitamin A; Future  -     PTH, intact; Future  -     Lipid panel; Future  -     Iron Panel (Includes Ferritin, Iron Sat%, Iron, and TIBC); Future    Mixed hyperlipidemia  -     Lipid panel; Future    High serum ferritin  -     Iron Panel (Includes Ferritin, Iron Sat%, Iron, and TIBC); Future  -     Iron Panel (Includes Ferritin, Iron Sat%, Iron, and TIBC); Future          Subjective:      Patient ID: Wilder Duran is a 66 y.o. female. -s/p Ailin-En-Y Gastric Bypass with Dr. Colt Ritchie on 10/24/22. Presents to the office today for routine follow up. Tolerating diet without issues; denies N/V, dysphagia, reflux. Overall doing very well. Daily BM. Tracking /logging - about 1800kcals, 70-75g protein, 49oz water    Father recently passed away. The following portions of the patient's history were reviewed and updated as appropriate: allergies, current medications, past family history, past medical history, past social history, past surgical history and problem list.    Review of Systems   Constitutional:  Negative for chills and fever. Unexpected weight change: planned weight loss. HENT:  Negative for trouble swallowing. Respiratory:  Negative for cough and shortness of breath. Cardiovascular:  Negative for chest pain and palpitations. Gastrointestinal:  Negative for abdominal pain, constipation, diarrhea, nausea and vomiting. Neurological:  Negative for dizziness.    Psychiatric/Behavioral:          Denies anxiety and depression         Objective:      /84 (BP Location: Right arm, Patient Position: Sitting, Cuff Size: Standard)   Pulse 63   Ht 5' 4" (1.626 m)   Wt 60.3 kg (133 lb)   BMI 22.83 kg/m²     Colonoscopy-Completed       Physical Exam  Vitals reviewed. Constitutional:       General: She is not in acute distress. Appearance: She is well-developed. HENT:      Head: Normocephalic and atraumatic. Eyes:      General: No scleral icterus. Cardiovascular:      Rate and Rhythm: Normal rate and regular rhythm. Pulmonary:      Effort: Pulmonary effort is normal. No respiratory distress. Abdominal:      General: There is no distension. Palpations: Abdomen is soft. Tenderness: There is no abdominal tenderness. Skin:     General: Skin is warm and dry. Neurological:      Mental Status: She is alert. Psychiatric:         Mood and Affect: Mood normal.         Behavior: Behavior normal.           BARRIERS: none identified    GOALS:   Maintain healthy weight loss with good nutrition intakes. Adequate hydration with at least 64oz. fluid intake. Normal vitamin and mineral levels. Exercise as tolerated. Follow-up in 1 year. We kindly ask that your arrive 15 minutes before your scheduled appointment time with your provider to allow our staff to room you, get your vital signs and update your chart. Follow diet as discussed. Get lab work done in 6 months - ferritin and rest of labs in 1 year. You have been given a lab slip today. Please call the office if you need a replacement. It is recommended to check with your insurance BEFORE getting labs done to make sure they are covered by your policy. Also, please check with your PCP and other providers before getting labs to avoid duplicate labs. Make sure to HOLD any multivitamins that may contain biotin and any biotin supplements FOR 5 DAYS before any labs since it can affect the results. Follow vitamin and mineral recommendations as reviewed with you. Call our office if you have any problems with abdominal pain especially associated with fever, chills, nausea, vomiting or any other concerns.     All  Post-bariatric surgery patients should be aware that very small quantities of any alcohol can cause impairment and it is very possible not to feel the effect. The effect can be in the system for several hours. It is also a stomach irritant. It is advised to AVOID alcohol, Nonsteroidal antiinflammatory drugs (NSAIDS) and nicotine of all forms . Any of these can cause stomach irritation/pain.

## 2023-10-27 NOTE — PATIENT INSTRUCTIONS
GOALS:   Maintain healthy weight loss with good nutrition intakes. Adequate hydration with at least 64oz. fluid intake. Normal vitamin and mineral levels. Exercise as tolerated. Follow-up in 1 year. We kindly ask that your arrive 15 minutes before your scheduled appointment time with your provider to allow our staff to room you, get your vital signs and update your chart. Follow diet as discussed. Get lab work done in 6 months - ferritin and rest of labs in 1 year. You have been given a lab slip today. Please call the office if you need a replacement. It is recommended to check with your insurance BEFORE getting labs done to make sure they are covered by your policy. Also, please check with your PCP and other providers before getting labs to avoid duplicate labs. Make sure to HOLD any multivitamins that may contain biotin and any biotin supplements FOR 5 DAYS before any labs since it can affect the results. Follow vitamin and mineral recommendations as reviewed with you. Call our office if you have any problems with abdominal pain especially associated with fever, chills, nausea, vomiting or any other concerns. All  Post-bariatric surgery patients should be aware that very small quantities of any alcohol can cause impairment and it is very possible not to feel the effect. The effect can be in the system for several hours. It is also a stomach irritant. It is advised to AVOID alcohol, Nonsteroidal antiinflammatory drugs (NSAIDS) and nicotine of all forms . Any of these can cause stomach irritation/pain.

## 2023-11-11 DIAGNOSIS — G25.81 RESTLESS LEG SYNDROME: ICD-10-CM

## 2023-11-13 RX ORDER — PRAMIPEXOLE DIHYDROCHLORIDE 0.12 MG/1
0.12 TABLET ORAL
Qty: 90 TABLET | Refills: 0 | Status: SHIPPED | OUTPATIENT
Start: 2023-11-13

## 2023-11-17 ENCOUNTER — OFFICE VISIT (OUTPATIENT)
Dept: BARIATRICS | Facility: CLINIC | Age: 58
End: 2023-11-17

## 2023-11-17 VITALS — BODY MASS INDEX: 22.8 KG/M2 | WEIGHT: 132.8 LBS

## 2023-11-17 VITALS — WEIGHT: 132.8 LBS | HEIGHT: 64 IN | BODY MASS INDEX: 22.67 KG/M2

## 2023-11-17 DIAGNOSIS — K91.2 POSTSURGICAL MALABSORPTION: Primary | ICD-10-CM

## 2023-11-17 DIAGNOSIS — Z48.815 ENCOUNTER FOR SURGICAL AFTERCARE FOLLOWING SURGERY OF DIGESTIVE SYSTEM: Primary | ICD-10-CM

## 2023-11-17 PROCEDURE — RECHECK

## 2023-11-17 NOTE — PROGRESS NOTES
Patient presents for post-op check in, current weight 132.8lbs. Eating behaviors/food choices: Has established consistent eating regiment, following bariatric guidelines and continuing to work with team on weight loss. Has concerns about regaining weight despite established healthy regiment. Discussed the importance grounding self in healthy eating habits that have led to health, keep checking in on investment in those habits and to use slip ups and set backs as learning opportunities. Be aware of all or nothing thinking, patient may engage in some emotional eating but keeps portions and food types in check for continued health and weight management. Mental Health/Wellness:  Patient reports some mood changes after initial surgery, describes them similar to menopause. Her moods did level out again but recently her dad passed away so she is going through the grieving process. Discussed the benefit of therapy for grief as well as continuing to manage moods and behaviors, offered to give resources, patient says she's going to do some research on her own and will reach out if she needs more help. She will have 1-2 alcohol beverages when socializing, reviewed the impact that alcohol has on post-bariatric body. Reminded patient of risk factors of intoxication symptoms and longer metabolizing of alcohol as well as increase for alcoholism. Patient has some people in her life who make negative comments about her having had the surgery, tell her about failed surgeries others have experienced. She tries to ignore these and get the facts that are relevant to her health, she has found a great support network of people who have had the surgery. Next Appointment: scheduled for annual follow up in November 2024, says she will call office if she feels she needs another visit with DANIELA.

## 2023-11-17 NOTE — PROGRESS NOTES
Bariatric Follow Up Nutrition Note    Type of surgery  Gastric bypass: laparoscopic  Surgery Date: 10/24/22  1 years  post-op  Surgeon: Dr Ruthann Batista  66 y.o.  female  Height 5' 4" (1.626 m), weight 60.2 kg (132 lb 12.8 oz). Body mass index is 22.8 kg/m². Initial:  234.4#  Weight on Day of Weight Loss Surgery: 219.6#  Weight in (lb) to have BMI = 25: 146.4#  Pre-Op Excess Wt: 88#  Post-Op Wt Loss: 101#/ 115% EBWL in 1 year(s)    Review of History and Medications   Past Medical History:   Diagnosis Date    Abnormal Pap smear of cervix     Blood clot in eye     Colon polyp     Depression     Hyperlipidemia     Hypertension     Obesity (BMI 30-39. 9)     Pre-diabetes     Restless legs      Past Surgical History:   Procedure Laterality Date    COLONOSCOPY      DENTAL IMPLANT      DILATION AND CURETTAGE OF UTERUS      EGD  04/15/2022    DC COLONOSCOPY FLX DX W/COLLJ SPEC WHEN PFRMD N/A 3/12/2018    Procedure: COLONOSCOPY;  Surgeon: Jeremy Ruffin MD;  [facility];   Service: Gastroenterology    DC LAPS GSTR RSTCV [address] [place] [address] W/BYP EMILY-EN-Y LIMB <150 CM N/A 10/24/2022    Procedure: LAPAROSCOPIC EMILY-EN-Y GASTRIC BYPASS AND INTRAOPERATIVE EGD;  Surgeon: Carmen Mojica MD;  Location: [facility];  Service: Bariatrics     Social History     Socioeconomic History    Marital status: /Civil Union     Spouse name: Not on file    Number of children: Not on file    Years of education: Not on file    Highest education level: Not on file   Occupational History    Not on file   Tobacco Use    Smoking status: Former     Years: 30.00     Types: Cigarettes     Quit date: 2015     Years since quittin.3    Smokeless tobacco: Never    Tobacco comments:     quit 2015   Vaping Use    Vaping Use: Former    Quit date: 2019    Substances: Nicotine, Flavoring   Substance and Sexual Activity    Alcohol use: Yes     Comment: socially    Drug use: No    Sexual activity: Not Currently Other Topics Concern    Not on file   Social History Narrative    Not on file     Social Determinants of Health     Financial Resource Strain: Not on file   Food Insecurity: Not on file   Transportation Needs: Not on file   Physical Activity: Not on file   Stress: Not on file   Social Connections: Not on file   Intimate Partner Violence: Not on file   Housing Stability: Not on file       Current Outpatient Medications:     Calcium 500-2.5 MG-MCG CHEW, Chew, Disp: , Rfl:     Multiple Vitamin (multivitamin) tablet, Take 1 tablet by mouth daily, Disp: , Rfl:     pramipexole (MIRAPEX) 0.125 mg tablet, Take 1 tablet (0.125 mg total) by mouth daily at bedtime, Disp: 90 tablet, Rfl: 0    Food Intake and Lifestyle Assessment   Food Intake Assessment completed via usual diet recall  Her dad passed aware about 2 weeks ago  Breads are creeping back-in  Wakes:  when commutes up at 4am, out the door by 5am and in work at [employer], home at 7:30pm 3x/week  When works from home 6am-7am    Breakfast: when in office- 6:30am-protein bar Numi (170cals, 11gm protein, 14gm carbs) + 2 cups of black coffee  Snack: 9am-nuts (260cals, 9gm protein) grazes on until lunch   Lunch: gets lunch at work cafeteria-meat, starch and veggie (1.25cups per meal on average) OR Salad with seeds, chicken, cheese, oil and vinegar.   Had a burger slider the other day out to eat, but doesn't the do the "sandwiches too much"  Snack: pretzels (160cals, 3gm protein)  Dinner: meat, starch and veggie about 1.25 cup portion OR just salad OR just baked potato  Snack: peanuts or string cheese (2-100cals, 12gm protein)  Bed:      Beverage intake: water and coffee, occasional protein shake  Diet texture/stage: regular  Protein supplement: occasional if didn't get it from food  Estimated protein intake per day: 72gm, 1756-1690 cals  Estimated fluid intake per day: 48oz water, 2 cups of black coffee  Meals eaten away from home: not too often, but is mindful when she does.  Typical meal pattern: 3 meals per day and 2 snacks per day  Eating Behaviors: Appropriate diet advancement, Appropriate portion sizes, Does not drink with meals and waits 30-minutes after meal before resuming drinking, and Needs to increase fluid intake. Vitamins:  2 calcium BA chew  2 BA EA multi chewable    Food allergies or intolerances: too much sweet  Cultural or Sikh considerations: -    Physical Assessment  Nutrition Related Findings  Hair Loss and Loss of Hunger    Physical Activity  Types of exercise: when at work it is walking around campus and walking up 5 flights of stairs (20 flights of stairs and 12,000 steps). When at home will do things between meeting for work--strength and walk at night  Current physical limitations: -    Psychosocial Assessment   Support systems: spouse friend(s) relative(s)  Socioeconomic factors: -    Nutrition Diagnosis  Diagnosis: Unintentional Weight Loss (NC-3.4)  Related to: Altered GI function  As Evidenced by: Unintentional weight loss     Interventions and Teaching   Patient educated on post-op nutrition guidelines. Patient educated and handouts provided.   Capacity of post-surgery stomach  Diet progression  Adequate hydration  Sugar and fat restriction to decrease "dumping syndrome"  Expected weight loss  Weight loss plateaus/ possibility of weight regain  Exercise  Nutrition considerations after surgery  Protein supplements  Meal planning and preparation  Appropriate carbohydrate, protein, and fat intake, and food/fluid choices to maximize safe weight loss, nutrient intake, and tolerance   Dietary and lifestyle changes  Possible problems with poor eating habits  Intuitive eating  Techniques for self monitoring and keeping daily food journal  Potential for food intolerance after surgery, and ways to deal with them including: lactose intolerance, nausea, reflux, vomiting, diarrhea, food intolerance, appetite changes, gas  Vitamin / Mineral supplementation of Multivitamin with minerals and Calcium    Education provided to: patient    Barriers to learning: No barriers identified    Readiness to change: action    Comprehension: verbalizes understanding     Expected Compliance: good    Pt presents today with excellent weight loss with over 100% of EBW lost. She reports she has been stable around her current weight. Reviewed diet recall and appears to be eating 3 meals per day and having about 2 snacks per day. Did suggest some higher protein, salty, crunchy snacks when looking for a snack (ie:  dry roasted edamame and dry roasted lisseth beans). Pt felt that was very helpful. Estimate pt's calorie intake for weight maintenance to be around 5690-2586 calories per day depending on her activity level for that week. Pt states she is most often around that range.       Goals  Food journal via baritastic  Exercise 30 minutes 5 times per week  Eat 3 meals per day with protein at each meal  Eliminate mindless snacking, continue with 1-2 planned snacks per day  Suggested some higher protein, salty crunchy snacks  7809-0601 calories per day for maintenance       Time Spent:   30 Minutes

## 2023-12-04 ENCOUNTER — RA CDI HCC (OUTPATIENT)
Dept: OTHER | Facility: HOSPITAL | Age: 58
End: 2023-12-04

## 2023-12-04 NOTE — PROGRESS NOTES
[address] [facility] coding opportunities       Chart reviewed, no opportunity found: CHART REVIEWED, NO OPPORTUNITY FOUND        Patients Insurance        Commercial Insurance: [address]

## 2023-12-11 ENCOUNTER — OFFICE VISIT (OUTPATIENT)
Dept: FAMILY MEDICINE CLINIC | Facility: CLINIC | Age: 58
End: 2023-12-11
Payer: COMMERCIAL

## 2023-12-11 VITALS
RESPIRATION RATE: 12 BRPM | WEIGHT: 130.2 LBS | DIASTOLIC BLOOD PRESSURE: 80 MMHG | TEMPERATURE: 96.9 F | BODY MASS INDEX: 22.23 KG/M2 | SYSTOLIC BLOOD PRESSURE: 130 MMHG | HEART RATE: 72 BPM | HEIGHT: 64 IN

## 2023-12-11 DIAGNOSIS — I10 PRIMARY HYPERTENSION: ICD-10-CM

## 2023-12-11 DIAGNOSIS — G47.09 OTHER INSOMNIA: Primary | ICD-10-CM

## 2023-12-11 DIAGNOSIS — G25.81 RESTLESS LEG SYNDROME: ICD-10-CM

## 2023-12-11 PROCEDURE — 99214 OFFICE O/P EST MOD 30 MIN: CPT | Performed by: INTERNAL MEDICINE

## 2023-12-11 RX ORDER — PRAMIPEXOLE DIHYDROCHLORIDE 0.25 MG/1
0.25 TABLET ORAL
Qty: 90 TABLET | Refills: 1 | Status: SHIPPED | OUTPATIENT
Start: 2023-12-11

## 2023-12-11 RX ORDER — TRAZODONE HYDROCHLORIDE 50 MG/1
50 TABLET ORAL
Qty: 30 TABLET | Refills: 1 | Status: SHIPPED | OUTPATIENT
Start: 2023-12-11

## 2023-12-11 NOTE — PROGRESS NOTES
Name: Feli Alejandro      : 1965      MRN: [id number]  Encounter Provider: Linda Martinez MD  Encounter Date: 2023   Encounter department: 2 [facility]     1. Other insomnia  -     traZODone (DESYREL) 50 mg tablet; Take 1 tablet (50 mg total) by mouth daily at bedtime    2. Restless leg syndrome  Assessment & Plan:  She has had to double up on her pramipexole but now has good relief of her symptoms. Will increase dose to . 25 mg daily, rx sent to the pharmacy. Orders:  -     pramipexole (MIRAPEX) 0.25 mg tablet; Take 1 tablet (0.25 mg total) by mouth daily at bedtime    3. Primary hypertension  Assessment & Plan:  Continues off bp medications and bp is well controlled, continue to monitor. Subjective      Here for follow up appointment. She is down another 20 pounds and has lost over 100 % of her excess weight. She feels physically good. Her father  suddenly in October from CHF and she is not sleeping well. Can't stop thinking about things. She is a little down and is talking to friends and does not feel she needs any medication for depression, but would like something to help her sleep. Has tried tylenol PM and melatonin without relief. Feels she could focus a bit better if she could get some sleep. RLS is now well controlled, she had to double up on her medication but the new dose is very helpful. Review of Systems   Constitutional: Negative. Respiratory: Negative. Cardiovascular: Negative. Psychiatric/Behavioral:  Positive for dysphoric mood and sleep disturbance. Negative for self-injury and suicidal ideas.         Current Outpatient Medications on File Prior to Visit   Medication Sig   • Calcium 500-2.5 MG-MCG CHEW Chew   • Multiple Vitamin (multivitamin) tablet Take 1 tablet by mouth daily   • [DISCONTINUED] pramipexole (MIRAPEX) 0.125 mg tablet Take 1 tablet (0.125 mg total) by mouth daily at bedtime       Objective There were no vitals taken for this visit. Physical Exam  Constitutional:       Appearance: She is well-developed. Eyes:      Conjunctiva/sclera: Conjunctivae normal.   Neck:      Thyroid: No thyromegaly. Vascular: No JVD. Cardiovascular:      Rate and Rhythm: Normal rate and regular rhythm. Heart sounds: Normal heart sounds. No murmur heard. No friction rub. No gallop. Pulmonary:      Effort: Pulmonary effort is normal.      Breath sounds: Normal breath sounds. No wheezing or rales. Abdominal:      General: Bowel sounds are normal. There is no distension. Palpations: Abdomen is soft. Tenderness: There is no abdominal tenderness. Musculoskeletal:      Cervical back: Neck supple. Psychiatric:         Mood and Affect: Mood normal.         Behavior: Behavior normal.         Thought Content:  Thought content normal.         Judgment: Judgment normal.       Niki Trinh MD

## 2023-12-11 NOTE — ASSESSMENT & PLAN NOTE
She has had to double up on her pramipexole but now has good relief of her symptoms. Will increase dose to . 25 mg daily, rx sent to the pharmacy.

## 2024-01-05 DIAGNOSIS — G47.09 OTHER INSOMNIA: ICD-10-CM

## 2024-01-05 RX ORDER — TRAZODONE HYDROCHLORIDE 50 MG/1
50 TABLET ORAL
Qty: 90 TABLET | Refills: 1 | Status: SHIPPED | OUTPATIENT
Start: 2024-01-05

## 2024-04-03 DIAGNOSIS — G25.81 RESTLESS LEG SYNDROME: ICD-10-CM

## 2024-04-03 RX ORDER — PRAMIPEXOLE DIHYDROCHLORIDE 0.25 MG/1
0.25 TABLET ORAL
Qty: 90 TABLET | Refills: 1 | Status: SHIPPED | OUTPATIENT
Start: 2024-04-03

## 2024-06-07 DIAGNOSIS — G47.09 OTHER INSOMNIA: ICD-10-CM

## 2024-06-07 RX ORDER — TRAZODONE HYDROCHLORIDE 50 MG/1
50 TABLET ORAL
Qty: 90 TABLET | Refills: 1 | Status: SHIPPED | OUTPATIENT
Start: 2024-06-07

## 2024-09-05 DIAGNOSIS — G25.81 RESTLESS LEG SYNDROME: ICD-10-CM

## 2024-09-06 RX ORDER — PRAMIPEXOLE DIHYDROCHLORIDE 0.25 MG/1
0.25 TABLET ORAL
Qty: 90 TABLET | Refills: 1 | Status: SHIPPED | OUTPATIENT
Start: 2024-09-06

## 2024-10-25 ENCOUNTER — LAB (OUTPATIENT)
Dept: LAB | Facility: CLINIC | Age: 59
End: 2024-10-25
Payer: COMMERCIAL

## 2024-10-25 DIAGNOSIS — K91.2 POSTSURGICAL MALABSORPTION: ICD-10-CM

## 2024-10-25 DIAGNOSIS — R79.89 HIGH SERUM FERRITIN: ICD-10-CM

## 2024-10-25 DIAGNOSIS — E78.2 MIXED HYPERLIPIDEMIA: ICD-10-CM

## 2024-10-25 LAB
25(OH)D3 SERPL-MCNC: 52.8 NG/ML (ref 30–100)
ALBUMIN SERPL BCG-MCNC: 3.8 G/DL (ref 3.5–5)
ALP SERPL-CCNC: 47 U/L (ref 34–104)
ALT SERPL W P-5'-P-CCNC: 14 U/L (ref 7–52)
ANION GAP SERPL CALCULATED.3IONS-SCNC: 9 MMOL/L (ref 4–13)
AST SERPL W P-5'-P-CCNC: 15 U/L (ref 13–39)
BASOPHILS # BLD AUTO: 0.04 THOUSANDS/ΜL (ref 0–0.1)
BASOPHILS NFR BLD AUTO: 1 % (ref 0–1)
BILIRUB SERPL-MCNC: 0.48 MG/DL (ref 0.2–1)
BUN SERPL-MCNC: 16 MG/DL (ref 5–25)
CALCIUM SERPL-MCNC: 9.3 MG/DL (ref 8.4–10.2)
CHLORIDE SERPL-SCNC: 103 MMOL/L (ref 96–108)
CHOLEST SERPL-MCNC: 204 MG/DL
CO2 SERPL-SCNC: 29 MMOL/L (ref 21–32)
CREAT SERPL-MCNC: 0.58 MG/DL (ref 0.6–1.3)
EOSINOPHIL # BLD AUTO: 0.17 THOUSAND/ΜL (ref 0–0.61)
EOSINOPHIL NFR BLD AUTO: 3 % (ref 0–6)
ERYTHROCYTE [DISTWIDTH] IN BLOOD BY AUTOMATED COUNT: 12.6 % (ref 11.6–15.1)
EST. AVERAGE GLUCOSE BLD GHB EST-MCNC: 111 MG/DL
FERRITIN SERPL-MCNC: 142 NG/ML (ref 11–307)
FOLATE SERPL-MCNC: >22.3 NG/ML
GFR SERPL CREATININE-BSD FRML MDRD: 102 ML/MIN/1.73SQ M
GLUCOSE P FAST SERPL-MCNC: 80 MG/DL (ref 65–99)
HBA1C MFR BLD: 5.5 %
HCT VFR BLD AUTO: 38.6 % (ref 34.8–46.1)
HDLC SERPL-MCNC: 64 MG/DL
HGB BLD-MCNC: 12.3 G/DL (ref 11.5–15.4)
IMM GRANULOCYTES # BLD AUTO: 0.01 THOUSAND/UL (ref 0–0.2)
IMM GRANULOCYTES NFR BLD AUTO: 0 % (ref 0–2)
IRON SATN MFR SERPL: 34 % (ref 15–50)
IRON SERPL-MCNC: 104 UG/DL (ref 50–212)
LDLC SERPL CALC-MCNC: 121 MG/DL (ref 0–100)
LYMPHOCYTES # BLD AUTO: 2.54 THOUSANDS/ΜL (ref 0.6–4.47)
LYMPHOCYTES NFR BLD AUTO: 51 % (ref 14–44)
MCH RBC QN AUTO: 30.7 PG (ref 26.8–34.3)
MCHC RBC AUTO-ENTMCNC: 31.9 G/DL (ref 31.4–37.4)
MCV RBC AUTO: 96 FL (ref 82–98)
MONOCYTES # BLD AUTO: 0.37 THOUSAND/ΜL (ref 0.17–1.22)
MONOCYTES NFR BLD AUTO: 8 % (ref 4–12)
NEUTROPHILS # BLD AUTO: 1.8 THOUSANDS/ΜL (ref 1.85–7.62)
NEUTS SEG NFR BLD AUTO: 37 % (ref 43–75)
NONHDLC SERPL-MCNC: 140 MG/DL
NRBC BLD AUTO-RTO: 0 /100 WBCS
PLATELET # BLD AUTO: 364 THOUSANDS/UL (ref 149–390)
PMV BLD AUTO: 10.4 FL (ref 8.9–12.7)
POTASSIUM SERPL-SCNC: 4.1 MMOL/L (ref 3.5–5.3)
PROT SERPL-MCNC: 6.3 G/DL (ref 6.4–8.4)
PTH-INTACT SERPL-MCNC: 24.4 PG/ML (ref 12–88)
RBC # BLD AUTO: 4.01 MILLION/UL (ref 3.81–5.12)
SODIUM SERPL-SCNC: 141 MMOL/L (ref 135–147)
TIBC SERPL-MCNC: 306 UG/DL (ref 250–450)
TRIGL SERPL-MCNC: 97 MG/DL
UIBC SERPL-MCNC: 202 UG/DL (ref 155–355)
VIT B12 SERPL-MCNC: 1727 PG/ML (ref 180–914)
WBC # BLD AUTO: 4.93 THOUSAND/UL (ref 4.31–10.16)

## 2024-10-25 PROCEDURE — 83970 ASSAY OF PARATHORMONE: CPT

## 2024-10-25 PROCEDURE — 82607 VITAMIN B-12: CPT

## 2024-10-25 PROCEDURE — 82306 VITAMIN D 25 HYDROXY: CPT

## 2024-10-25 PROCEDURE — 82746 ASSAY OF FOLIC ACID SERUM: CPT

## 2024-10-25 PROCEDURE — 83540 ASSAY OF IRON: CPT

## 2024-10-25 PROCEDURE — 80053 COMPREHEN METABOLIC PANEL: CPT

## 2024-10-25 PROCEDURE — 36415 COLL VENOUS BLD VENIPUNCTURE: CPT

## 2024-10-25 PROCEDURE — 83550 IRON BINDING TEST: CPT

## 2024-10-25 PROCEDURE — 84630 ASSAY OF ZINC: CPT

## 2024-10-25 PROCEDURE — 84425 ASSAY OF VITAMIN B-1: CPT

## 2024-10-25 PROCEDURE — 82728 ASSAY OF FERRITIN: CPT

## 2024-10-25 PROCEDURE — 80061 LIPID PANEL: CPT

## 2024-10-25 PROCEDURE — 85025 COMPLETE CBC W/AUTO DIFF WBC: CPT

## 2024-10-25 PROCEDURE — 84590 ASSAY OF VITAMIN A: CPT

## 2024-10-25 PROCEDURE — 83036 HEMOGLOBIN GLYCOSYLATED A1C: CPT

## 2024-10-25 NOTE — RESULT ENCOUNTER NOTE
Please let Winifred know about her labs from 10/25/24:    -elevated B12 - safe, no changes    -LDL trending up slightly - -Avoid fried foods and trans fat, limit saturated fats and refined carbohydrates   -Increase fish/omega 3 FA consumption  -Increase physical activity    -rest of vitamins WNL so far - continue current regimen thanks

## 2024-10-28 LAB — ZINC SERPL-MCNC: 53 UG/DL (ref 44–115)

## 2024-10-28 NOTE — RESULT ENCOUNTER NOTE
Please let Winifred know to take an extra 50-60mg zinc for about 2 months to boost her low normal levels thank you

## 2024-10-29 ENCOUNTER — VBI (OUTPATIENT)
Dept: ADMINISTRATIVE | Facility: OTHER | Age: 59
End: 2024-10-29

## 2024-10-29 LAB — VIT B1 BLD-SCNC: 194.4 NMOL/L (ref 66.5–200)

## 2024-10-29 NOTE — TELEPHONE ENCOUNTER
10/29/24 3:24 PM     Chart reviewed for Pap Smear (HPV) aka Cervical Cancer Screening ; nothing is submitted to the patient's insurance at this time.     Kareem Garibay MA   [facility]

## 2024-10-31 LAB — VIT A SERPL-MCNC: 51.9 UG/DL (ref 20.1–62)

## 2024-11-06 NOTE — ASSESSMENT & PLAN NOTE
-At risk for malabsorption of vitamins/minerals secondary to malabsorption and restriction of intake from bariatric surgery  -Currently taking adequate postop bariatric surgery vitamin supplementation: Bariatric MVI w/ 45mg iron, calcium citrate 500mg BID    -CBC/Metabolic panel 10/25/24 - vitamins WNL    -Repeat labs ordered for 1 year    -Patient received education about the importance of adhering to a lifelong supplementation regimen to avoid vitamin/mineral deficiencies

## 2024-11-06 NOTE — ASSESSMENT & PLAN NOTE
-Avoid fried foods and trans fat, limit saturated fats and refined carbohydrates  -Increase fish/omega 3 FA consumption  -Increase physical activity  -LDL trending up some but HDL improved too

## 2024-11-06 NOTE — ASSESSMENT & PLAN NOTE
-s/p Ailin-En-Y Gastric Bypass with Dr. Ko on 10/24/22.  She will work on increasing water intake and continue excellent nutrition and f/u with me in 1 year.    Initial: 234.4lbs  Current: 132.8lbs  EWL: 115%  Saw: current  Current BMI is Body mass index is 22.8 kg/m².    Tolerating a regular diet-yes  Eating at least 60 grams of protein per day-yes  Following 30/60 minute rule with liquids-yes  Drinking at least 64 ounces of fluid per day-no  Drinking carbonated beverages-no  Sufficient exercise-yes, walking, strength training, gym  Using NSAIDs regularly-no  Using nicotine-no  Using alcohol-no. Advised about the risks of alcohol s/p bariatric surgery and recommend avoiding all alcohol

## 2024-11-08 ENCOUNTER — OFFICE VISIT (OUTPATIENT)
Dept: BARIATRICS | Facility: CLINIC | Age: 59
End: 2024-11-08
Payer: COMMERCIAL

## 2024-11-08 VITALS
WEIGHT: 132.8 LBS | SYSTOLIC BLOOD PRESSURE: 132 MMHG | DIASTOLIC BLOOD PRESSURE: 70 MMHG | HEART RATE: 58 BPM | BODY MASS INDEX: 22.67 KG/M2 | HEIGHT: 64 IN | OXYGEN SATURATION: 98 %

## 2024-11-08 DIAGNOSIS — E78.2 MIXED HYPERLIPIDEMIA: ICD-10-CM

## 2024-11-08 DIAGNOSIS — Z48.815 ENCOUNTER FOR SURGICAL AFTERCARE FOLLOWING SURGERY OF DIGESTIVE SYSTEM: Primary | ICD-10-CM

## 2024-11-08 DIAGNOSIS — K91.2 POSTSURGICAL MALABSORPTION: ICD-10-CM

## 2024-11-08 PROCEDURE — 99214 OFFICE O/P EST MOD 30 MIN: CPT | Performed by: PHYSICIAN ASSISTANT

## 2024-11-08 NOTE — PROGRESS NOTES
Assessment/Plan:    Encounter for surgical aftercare following surgery of digestive system  -s/p Ailin-En-Y Gastric Bypass with Dr. Ko on 10/24/22.  She will work on increasing water intake and continue excellent nutrition and f/u with me in 1 year.    Initial: 234.4lbs  Current: 132.8lbs  EWL: 115%  Saw: current  Current BMI is Body mass index is 22.8 kg/m².    Tolerating a regular diet-yes  Eating at least 60 grams of protein per day-yes  Following 30/60 minute rule with liquids-yes  Drinking at least 64 ounces of fluid per day-no  Drinking carbonated beverages-no  Sufficient exercise-yes, walking, strength training, gym  Using NSAIDs regularly-no  Using nicotine-no  Using alcohol-no. Advised about the risks of alcohol s/p bariatric surgery and recommend avoiding all alcohol      Postsurgical malabsorption  -At risk for malabsorption of vitamins/minerals secondary to malabsorption and restriction of intake from bariatric surgery  -Currently taking adequate postop bariatric surgery vitamin supplementation: Bariatric MVI w/ 45mg iron, calcium citrate 500mg BID    -CBC/Metabolic panel 10/25/24 - vitamins WNL    -Repeat labs ordered for 1 year    -Patient received education about the importance of adhering to a lifelong supplementation regimen to avoid vitamin/mineral deficiencies       Mixed hyperlipidemia  -Avoid fried foods and trans fat, limit saturated fats and refined carbohydrates  -Increase fish/omega 3 FA consumption  -Increase physical activity  -LDL trending up some but HDL improved too     Diagnoses and all orders for this visit:    Encounter for surgical aftercare following surgery of digestive system    Postsurgical malabsorption  -     CBC and differential; Future  -     Comprehensive metabolic panel; Future  -     Folate; Future  -     Hemoglobin A1C; Future  -     Iron Panel (Includes Ferritin, Iron Sat%, Iron, and TIBC); Future  -     Vitamin A; Future  -     TSH, 3rd generation with Free T4  "reflex; Future  -     PTH, intact; Future  -     Lipid panel; Future  -     Vitamin B1, whole blood; Future  -     Vitamin B12; Future  -     Vitamin D 25 hydroxy; Future  -     Zinc; Future    Mixed hyperlipidemia  -     Lipid panel; Future          Subjective:      Patient ID: Winifred Hein is a 59 y.o. female.    -s/p Ailin-En-Y Gastric Bypass with Dr. Ko on 10/24/22.. Presents to the office today for routine follow up. Tolerating diet without issues; denies N/V, dysphagia, reflux. Overall doing very well maintaining a healthy weight. No complaints and feels good.    Eats 1900kcals and 70g protein, 36oz fluids.    Father passed away last year and unfortunately her step father has CA - they live in [facility].  Works at [employer]. She and her  took an amazing cross country drive this year. Daughter having baby in May!    The following portions of the patient's history were reviewed and updated as appropriate: allergies, current medications, past family history, past medical history, past social history, past surgical history and problem list.    Review of Systems   Constitutional:  Negative for chills, fever and unexpected weight change (stable).   HENT:  Negative for trouble swallowing.    Respiratory:  Negative for cough and shortness of breath.    Cardiovascular:  Negative for chest pain and palpitations.   Gastrointestinal:  Negative for abdominal pain, constipation, diarrhea, nausea and vomiting.   Neurological:  Negative for dizziness.   Psychiatric/Behavioral:          Denies anxiety and depression         Objective:      /70 (BP Location: Right arm, Patient Position: Sitting, Cuff Size: Standard)   Pulse 58   Ht 5' 4\" (1.626 m)   Wt 60.2 kg (132 lb 12.8 oz)   SpO2 98%   BMI 22.80 kg/m²     Colonoscopy-Completed       Physical Exam  Vitals reviewed.   Constitutional:       General: She is not in acute distress.     Appearance: She is well-developed.   HENT:      Head: Normocephalic and " atraumatic.   Eyes:      General: No scleral icterus.  Cardiovascular:      Rate and Rhythm: Normal rate.   Pulmonary:      Effort: Pulmonary effort is normal. No respiratory distress.   Abdominal:      General: There is no distension.      Palpations: Abdomen is soft.   Skin:     General: Skin is warm and dry.   Neurological:      Mental Status: She is alert.   Psychiatric:         Mood and Affect: Mood normal.         Behavior: Behavior normal.           BARRIERS: none identified    GOALS:   Maintain healthy weight loss with good nutrition intakes.  Adequate hydration with at least 64oz. fluid intake.  Normal vitamin and mineral levels.  Exercise as tolerated.  [facility] - [facility] in [facility] [phone]    Follow-up in 1 year. We kindly ask that your arrive 15 minutes before your scheduled appointment time with your provider to allow our staff to room you, get your vital signs and update your chart.    Follow diet as discussed.      Get lab work done in 1 year  You have been given a lab slip today.  Please call the office if you need a replacement.  It is recommended to check with your insurance BEFORE getting labs done to make sure they are covered by your policy.  Also, please check with your PCP and other providers before getting labs to avoid duplicate labs. Make sure to HOLD any multivitamins that may contain biotin and any biotin supplements FOR 5 DAYS before any labs since it can affect the results.    Follow vitamin and mineral recommendations as reviewed with you.    Call our office if you have any problems with abdominal pain especially associated with fever, chills, nausea, vomiting or any other concerns.    All  Post-bariatric surgery patients should be aware that very small quantities of any alcohol can cause impairment and it is very possible not to feel the effect. The effect can be in the system for several hours.  It is also a stomach irritant.     It is advised to AVOID alcohol,  Nonsteroidal antiinflammatory drugs (NSAIDS) and nicotine of all forms . Any of these can cause stomach irritation/pain.

## 2024-11-08 NOTE — PATIENT INSTRUCTIONS
GOALS:   Maintain healthy weight loss with good nutrition intakes.  Adequate hydration with at least 64oz. fluid intake.  Normal vitamin and mineral levels.  Exercise as tolerated.  [facility] - [facility] in [facility] [phone]    Follow-up in 1 year. We kindly ask that your arrive 15 minutes before your scheduled appointment time with your provider to allow our staff to room you, get your vital signs and update your chart.    Follow diet as discussed.      Get lab work done in 1 year  You have been given a lab slip today.  Please call the office if you need a replacement.  It is recommended to check with your insurance BEFORE getting labs done to make sure they are covered by your policy.  Also, please check with your PCP and other providers before getting labs to avoid duplicate labs. Make sure to HOLD any multivitamins that may contain biotin and any biotin supplements FOR 5 DAYS before any labs since it can affect the results.    Follow vitamin and mineral recommendations as reviewed with you.    Call our office if you have any problems with abdominal pain especially associated with fever, chills, nausea, vomiting or any other concerns.    All  Post-bariatric surgery patients should be aware that very small quantities of any alcohol can cause impairment and it is very possible not to feel the effect. The effect can be in the system for several hours.  It is also a stomach irritant.     It is advised to AVOID alcohol, Nonsteroidal antiinflammatory drugs (NSAIDS) and nicotine of all forms . Any of these can cause stomach irritation/pain.

## 2024-11-30 DIAGNOSIS — G47.09 OTHER INSOMNIA: ICD-10-CM

## 2024-12-02 RX ORDER — TRAZODONE HYDROCHLORIDE 50 MG/1
50 TABLET, FILM COATED ORAL
Qty: 30 TABLET | Refills: 0 | Status: SHIPPED | OUTPATIENT
Start: 2024-12-02

## 2025-02-23 DIAGNOSIS — G47.09 OTHER INSOMNIA: ICD-10-CM

## 2025-02-24 RX ORDER — TRAZODONE HYDROCHLORIDE 50 MG/1
50 TABLET ORAL
Qty: 90 TABLET | Refills: 1 | Status: SHIPPED | OUTPATIENT
Start: 2025-02-24

## 2025-05-15 ENCOUNTER — TELEPHONE (OUTPATIENT)
Dept: GASTROENTEROLOGY | Facility: CLINIC | Age: 60
End: 2025-05-15

## 2025-05-15 NOTE — TELEPHONE ENCOUNTER
Pt is due for a colonoscopy for hx of ta polyps, hx of colonic polyps with Dr Jackson. I lmom for pt to please call back to schedule. Recall letter mailed.

## 2025-05-26 DIAGNOSIS — G25.81 RESTLESS LEG SYNDROME: ICD-10-CM

## 2025-05-27 RX ORDER — PRAMIPEXOLE DIHYDROCHLORIDE 0.25 MG/1
0.25 TABLET ORAL
Qty: 90 TABLET | Refills: 1 | Status: SHIPPED | OUTPATIENT
Start: 2025-05-27

## (undated) DEVICE — DRAPE EQUIPMENT RF WAND

## (undated) DEVICE — ADHESIVE SKIN CLSR DERMABOND NX

## (undated) DEVICE — LAPAROSCOPIC TROCAR SLEEVE/SINGLE USE: Brand: KII® SLEEVE

## (undated) DEVICE — COVIDIEN ENDO GIA PURPLE (MED) RELOAD 60MM

## (undated) DEVICE — SCD SEQUENTIAL COMPRESSION COMFORT SLEEVE MEDIUM KNEE LENGTH: Brand: KENDALL SCD

## (undated) DEVICE — TRAVELKIT CONTAINS FIRST STEP KIT (200ML EP-4 KIT) AND SOILED SCOPE BAG - 1 KIT: Brand: TRAVELKIT CONTAINS FIRST STEP KIT AND SOILED SCOPE BAG

## (undated) DEVICE — NEEDLE SPINAL 20G X 3.5 LF

## (undated) DEVICE — LIGHT HANDLE COVER SLEEVE DISP BLUE STELLAR

## (undated) DEVICE — ASTOUND STANDARD SURGICAL GOWN, XL: Brand: CONVERTORS

## (undated) DEVICE — TIBURON LAPAROSCOPIC ABDOMINAL DRAPE: Brand: CONVERTORS

## (undated) DEVICE — SUT MONOCRYL 4-0 PS-2 27 IN Y426H

## (undated) DEVICE — INTENDED FOR TISSUE SEPARATION, AND OTHER PROCEDURES THAT REQUIRE A SHARP SURGICAL BLADE TO PUNCTURE OR CUT.: Brand: BARD-PARKER SAFETY BLADES SIZE 11, STERILE

## (undated) DEVICE — 60 ML SYRINGE,REGULAR TIP: Brand: MONOJECT

## (undated) DEVICE — LUBRICANT SURGILUBE TUBE 4 OZ  FLIP TOP

## (undated) DEVICE — 10 MM BABCOCKS WITH RATCHET HANDLES: Brand: ENDOPATH

## (undated) DEVICE — TUBING SUCTION 5MM X 12 FT

## (undated) DEVICE — PENCIL ELECTROSURG E-Z CLEAN -0035H

## (undated) DEVICE — PAD GROUNDING ADULT

## (undated) DEVICE — ENDOPATH XCEL BLADELESS TROCARS WITH STABILITY SLEEVES: Brand: ENDOPATH XCEL

## (undated) DEVICE — ELECTRODE LAP SPATULA STR E-Z CLEAN 33CM -0018

## (undated) DEVICE — GLOVE SRG BIOGEL ECLIPSE 7.5

## (undated) DEVICE — SUT ETHIBOND 2-0 SH/SH 36 IN X523H

## (undated) DEVICE — ENDOPATH 5MM CURVED SCISSORS WITH MONOPOLAR CAUTERY: Brand: ENDOPATH

## (undated) DEVICE — KERLIX BANDAGE ROLL: Brand: KERLIX

## (undated) DEVICE — VISUALIZATION SYSTEM: Brand: CLEARIFY

## (undated) DEVICE — MAYO STAND COVER: Brand: CONVERTORS

## (undated) DEVICE — BOWL: 16OZ PEELPOUCH 75/CS 16/PLT: Brand: MEDEGEN MEDICAL PRODUCTS, LLC

## (undated) DEVICE — CHLORAPREP HI-LITE 26ML ORANGE

## (undated) DEVICE — [HIGH FLOW INSUFFLATOR,  DO NOT USE IF PACKAGE IS DAMAGED,  KEEP DRY,  KEEP AWAY FROM SUNLIGHT,  PROTECT FROM HEAT AND RADIOACTIVE SOURCES.]: Brand: PNEUMOSURE

## (undated) DEVICE — GLOVE SRG BIOGEL 7

## (undated) DEVICE — GLOVE INDICATOR PI UNDERGLOVE SZ 7 BLUE

## (undated) DEVICE — PMI DISPOSABLE PUNCTURE CLOSURE DEVICE / SUTURE GRASPER: Brand: PMI

## (undated) DEVICE — SUT PDS II 3-0 SH 27 IN Z316H

## (undated) DEVICE — TISSUE RETRIEVAL SYSTEM: Brand: INZII RETRIEVAL SYSTEM

## (undated) DEVICE — LIGACLIP 10-M/L, 10MM ENDOSCOPIC ROTATING MULTIPLE CLIP APPLIERS: Brand: LIGACLIP

## (undated) DEVICE — GLOVE SRG BIOGEL ECLIPSE 8

## (undated) DEVICE — POWER SHELL SIGNIA

## (undated) DEVICE — IRRIG ENDO FLO TUBING

## (undated) DEVICE — SUT VICRYL 0 REEL 54 IN J287G

## (undated) DEVICE — ADHESIVE SKIN CLOSR DERMABOND PRINEO

## (undated) DEVICE — TROCAR: Brand: KII FIOS FIRST ENTRY

## (undated) DEVICE — SMOKE REMOVAL SYSTEM: Brand: BOEHRINGER® SMOKE REMOVAL SYSTEM

## (undated) DEVICE — SYRINGE 20ML LL

## (undated) DEVICE — ALLENTOWN LAP CHOLE APP PACK: Brand: CARDINAL HEALTH

## (undated) DEVICE — WEBRIL 6 IN UNSTERILE

## (undated) DEVICE — STAPLE RELOAD ENDO EGIA ARTICULATING MEDIUM TAN 2MM.5MM.3MM

## (undated) DEVICE — HARMONIC 1100 SHEARS, 36CM SHAFT LENGTH: Brand: HARMONIC